# Patient Record
Sex: FEMALE | Race: WHITE | Employment: OTHER | URBAN - METROPOLITAN AREA
[De-identification: names, ages, dates, MRNs, and addresses within clinical notes are randomized per-mention and may not be internally consistent; named-entity substitution may affect disease eponyms.]

---

## 2017-01-25 ENCOUNTER — OFFICE VISIT (OUTPATIENT)
Dept: OBGYN | Facility: CLINIC | Age: 72
End: 2017-01-25
Payer: MEDICARE

## 2017-01-25 VITALS
DIASTOLIC BLOOD PRESSURE: 80 MMHG | HEART RATE: 69 BPM | SYSTOLIC BLOOD PRESSURE: 142 MMHG | HEIGHT: 64 IN | WEIGHT: 173 LBS | BODY MASS INDEX: 29.53 KG/M2

## 2017-01-25 DIAGNOSIS — Z46.89 PESSARY MAINTENANCE: Primary | ICD-10-CM

## 2017-01-25 PROCEDURE — 99212 OFFICE O/P EST SF 10 MIN: CPT | Performed by: OBSTETRICS & GYNECOLOGY

## 2017-01-25 NOTE — NURSING NOTE
"Initial /80 mmHg  Pulse 69  Ht 5' 3.5\" (1.613 m)  Wt 173 lb (78.472 kg)  BMI 30.16 kg/m2 Estimated body mass index is 30.16 kg/(m^2) as calculated from the following:    Height as of this encounter: 5' 3.5\" (1.613 m).    Weight as of this encounter: 173 lb (78.472 kg). .      "

## 2017-01-25 NOTE — PROGRESS NOTES
"Pessary Check    Ms. Mary Art 69 year old presents for pessary check. No issues or complaints at this time.     ROS: 10 point ROS neg other than the symptoms noted above in the HPI.    Exam:   /80 mmHg  Pulse 69  Ht 5' 3.5\" (1.613 m)  Wt 173 lb (78.472 kg)  BMI 30.16 kg/m2  General Appearance: NAD  Pelvic: Normal external female genitalia. No external lesions, normal hair distribution, no adenopathy. Speculum exam reveals vaginal epithelium mildly atrophic, no erosions or lesions noted. Bimanual exam revealed absent uterus and no pelvic masses. No pelvic tenderness.     Pessary cleaned and reinserted without incident    A/P: Pessary check  No complications     RTC in 3-4 months    Candi Masterson MD  [facility]  "

## 2017-01-25 NOTE — MR AVS SNAPSHOT
"              After Visit Summary   1/25/2017    Mary Art    MRN: [id number]           Patient Information     Date Of Birth          1945        Visit Information        Provider Department      1/25/2017 9:15 AM Candi Masterson MD [facility]        Today's Diagnoses     Pessary maintenance    -  1       Care Instructions    .        Follow-ups after your visit        Your next 10 appointments already scheduled     Jan 25, 2017  9:15 AM   SHORT with Candi Masterson MD   [facility] ([facility])    [address] [address]   [phone]              Who to contact     If you have questions or need follow up information about today's clinic visit or your schedule please contact [facility] directly at [phone].  Normal or non-critical lab and imaging results will be communicated to you by MyChart, letter or phone within 4 business days after the clinic has received the results. If you do not hear from us within 7 days, please contact the clinic through MyChart or phone. If you have a critical or abnormal lab result, we will notify you by phone as soon as possible.  Submit refill requests through [employer] or call your pharmacy and they will forward the refill request to us. Please allow 3 business days for your refill to be completed.          Additional Information About Your Visit        MyChart Information     [employer] lets you send messages to your doctor, view your test results, renew your prescriptions, schedule appointments and more. To sign up, go to [email or web address] . Click on \"Log in\" on the left side of the screen, which will take you to the Welcome page. Then click on \"Sign up Now\" on the right side of the page.     You will be asked to enter the access code listed below, as well as some personal information. Please follow the directions to create your username and password.     Your " "access code is: [email or web address]  Expires: 2017  9:00 AM     Your access code will  in 90 days. If you need help or a new code, please call your [facility] or [phone].        Care EveryWhere ID     This is your Care EveryWhere ID. This could be used by other organizations to access your [place] medical records  [id number]        Your Vitals Were     Height BMI (Body Mass Index)                5' 3.5\" (1.613 m) 30.16 kg/m2           Blood Pressure from Last 3 Encounters:   16 130/79   16 126/64   16 136/82    Weight from Last 3 Encounters:   17 173 lb (78.472 kg)   16 174 lb 3.2 oz (79.017 kg)   16 174 lb (78.926 kg)              We Performed the Following     FIT/INSERT INTRAVAG SUPPORT DEVICE/PESSARY        Primary Care Provider Office Phone # Fax #    Heather Waller [phone]       [facility] [address] [facility] [address]        Thank you!     Thank you for choosing [facility]  for your care. Our goal is always to provide you with excellent care. Hearing back from our patients is one way we can continue to improve our services. Please take a few minutes to complete the written survey that you may receive in the mail after your visit with us. Thank you!             Your Updated Medication List - Protect others around you: Learn how to safely use, store and throw away your medicines at www.disposemymeds.org.          This list is accurate as of: 17  9:00 AM.  Always use your most recent med list.                   Brand Name Dispense Instructions for use    ascorbic acid 500 MG tablet    VITAMIN C    3 MONTHS    ONE TABLET DAILY       aspirin 81 MG tablet      Take 1 tablet by mouth daily.       CENTRUM SILVER per tablet     90 Tab    ONE DAILY       CRANBERRY          estradiol 0.5 MG tablet    ESTRACE    36 tablet    Place 1 tablet in the vagina 3 times weekly.       lisinopril-hydrochlorothiazide " 10-12.5 MG per tablet    PRINZIDE/ZESTORETIC    90 tablet    Take 1 tablet by mouth daily       VITAMIN D PO      Take 1 tablet by mouth daily.

## 2017-03-14 ENCOUNTER — OFFICE VISIT (OUTPATIENT)
Dept: FAMILY MEDICINE | Facility: CLINIC | Age: 72
End: 2017-03-14
Payer: MEDICARE

## 2017-03-14 VITALS
HEART RATE: 85 BPM | TEMPERATURE: 97 F | SYSTOLIC BLOOD PRESSURE: 126 MMHG | WEIGHT: 168.8 LBS | OXYGEN SATURATION: 98 % | RESPIRATION RATE: 18 BRPM | BODY MASS INDEX: 29.43 KG/M2 | DIASTOLIC BLOOD PRESSURE: 62 MMHG

## 2017-03-14 DIAGNOSIS — R22.9 LOCALIZED SUPERFICIAL SWELLING, MASS, OR LUMP: ICD-10-CM

## 2017-03-14 DIAGNOSIS — R22.33 AXILLARY LUMP, BILATERAL: ICD-10-CM

## 2017-03-14 DIAGNOSIS — L30.9 DERMATITIS: Primary | ICD-10-CM

## 2017-03-14 PROCEDURE — 99214 OFFICE O/P EST MOD 30 MIN: CPT | Performed by: NURSE PRACTITIONER

## 2017-03-14 RX ORDER — TRIAMCINOLONE ACETONIDE 1 MG/G
CREAM TOPICAL
Qty: 80 G | Refills: 0 | Status: SHIPPED | OUTPATIENT
Start: 2017-03-14 | End: 2017-09-20 | Stop reason: ALTCHOICE

## 2017-03-14 NOTE — MR AVS SNAPSHOT
"              After Visit Summary   3/14/2017    Mary Art    MRN: [id number]           Patient Information     Date Of Birth          1945        Visit Information        Provider Department      3/14/2017 9:20 AM Pauly Hannah APRN CNP [facility]        Today's Diagnoses     Dermatitis    -  1    Localized superficial swelling, mass, or lump        Axillary lump, bilateral          Care Instructions    Apply a thin layer of Kenalog cream up to three times a day as needed.    Let us know if the rash does not resolve.    Schedule the ultrasound in [place] [phone]. We'll contact you with the results.        Follow-ups after your visit        Future tests that were ordered for you today     Open Future Orders        Priority Expected Expires Ordered    US Axillary Left Routine  3/14/2018 3/14/2017    US Axillary Right Routine  3/14/2018 3/14/2017            Who to contact     If you have questions or need follow up information about today's clinic visit or your schedule please contact [facility] directly at [phone].  Normal or non-critical lab and imaging results will be communicated to you by MyChart, letter or phone within 4 business days after the clinic has received the results. If you do not hear from us within 7 days, please contact the clinic through MyChart or phone. If you have a critical or abnormal lab result, we will notify you by phone as soon as possible.  Submit refill requests through [employer] or call your pharmacy and they will forward the refill request to us. Please allow 3 business days for your refill to be completed.          Additional Information About Your Visit        [employer] Information     [employer] lets you send messages to your doctor, view your test results, renew your prescriptions, schedule appointments and more. To sign up, go to [email or web address] . Click on \"Log in\" on the left side of the screen, which will take you to " "the Welcome page. Then click on \"Sign up Now\" on the right side of the page.     You will be asked to enter the access code listed below, as well as some personal information. Please follow the directions to create your username and password.     Your access code is: [email or web address]  Expires: 2017 10:00 AM     Your access code will  in 90 days. If you need help or a new code, please call your [place] clinic or [phone].        Care EveryWhere ID     This is your Care EveryWhere ID. This could be used by other organizations to access your [place] medical records  [id number]        Your Vitals Were     Pulse Temperature Respirations Pulse Oximetry BMI (Body Mass Index)       85 97  F (36.1  C) (Tympanic) 18 98% 29.43 kg/m2        Blood Pressure from Last 3 Encounters:   17 126/62   17 142/80   16 130/79    Weight from Last 3 Encounters:   17 168 lb 12.8 oz (76.6 kg)   17 173 lb (78.5 kg)   16 174 lb 3.2 oz (79 kg)                 Today's Medication Changes          These changes are accurate as of: 3/14/17  9:56 AM.  If you have any questions, ask your nurse or doctor.               Start taking these medicines.        Dose/Directions    triamcinolone 0.1 % cream   Commonly known as:  KENALOG   Used for:  Dermatitis   Started by:  Pauly Hannah APRN CNP        Apply sparingly to affected area three times daily as needed   Quantity:  80 g   Refills:  0            Where to get your medicines      These medications were sent to [facility] [place] - [facility] [address]  [address] [facility] [address]     Phone:  [phone]     triamcinolone 0.1 % cream                Primary Care Provider Office Phone # Fax #    Heather Waller [phone]       [facility] [address]  [facility] [address]        Thank you!     Thank you for choosing [facility]  for your care. Our goal is always to " provide you with excellent care. Hearing back from our patients is one way we can continue to improve our services. Please take a few minutes to complete the written survey that you may receive in the mail after your visit with us. Thank you!             Your Updated Medication List - Protect others around you: Learn how to safely use, store and throw away your medicines at www.disposemymeds.org.          This list is accurate as of: 3/14/17  9:56 AM.  Always use your most recent med list.                   Brand Name Dispense Instructions for use    ascorbic acid 500 MG tablet    VITAMIN C    3 MONTHS    ONE TABLET DAILY       aspirin 81 MG tablet      Take 1 tablet by mouth daily.       CENTRUM SILVER per tablet     90 Tab    ONE DAILY       CRANBERRY          estradiol 0.5 MG tablet    ESTRACE    36 tablet    Place 1 tablet in the vagina 3 times weekly.       lisinopril-hydrochlorothiazide 10-12.5 MG per tablet    PRINZIDE/ZESTORETIC    90 tablet    Take 1 tablet by mouth daily       triamcinolone 0.1 % cream    KENALOG    80 g    Apply sparingly to affected area three times daily as needed       VITAMIN D PO      Take 1 tablet by mouth daily.

## 2017-03-14 NOTE — PATIENT INSTRUCTIONS
Apply a thin layer of Kenalog cream up to three times a day as needed.    Let us know if the rash does not resolve.    Schedule the ultrasound in [place] [phone]. We'll contact you with the results.

## 2017-03-14 NOTE — NURSING NOTE
"Chief Complaint   Patient presents with     Mass       Initial There were no vitals taken for this visit. Estimated body mass index is 30.16 kg/(m^2) as calculated from the following:    Height as of 1/25/17: 5' 3.5\" (1.613 m).    Weight as of 1/25/17: 173 lb (78.5 kg).  Medication Reconciliation: complete  "

## 2017-03-14 NOTE — PROGRESS NOTES
SUBJECTIVE:                                                    Mary Art is a 71 year old female who presents to clinic today for the following health issues:      Bumps under right arm      Duration: 1.5 weeks    Description (location/character/radiation): bumps and redness under arm right > left    Intensity:  mild    Accompanying signs and symptoms: itching and redness    History (similar episodes/previous evaluation): had one other time years ago, shaved under arms    Precipitating or alleviating factors: shaved under arm    Therapies tried and outcome: None   Concerned that lump may be abnormal. Very Concerned.  Not painful.  Had in past, in 2013. Bilateral US at that time were negative.     Problem list and histories reviewed & adjusted, as indicated.  Additional history: as documented      Reviewed and updated as needed this visit by clinical staff  Tobacco  Allergies  Problems  Med Hx  Surg Hx  Fam Hx  Soc Hx      Reviewed and updated as needed this visit by Provider         ROS:  Constitutional, HEENT, cardiovascular, pulmonary, gi and gu systems are negative, except as otherwise noted.    OBJECTIVE:                                                    /62 (BP Location: Right arm, Patient Position: Chair, Cuff Size: Adult Large)  Pulse 85  Temp 97  F (36.1  C) (Tympanic)  Resp 18  Wt 168 lb 12.8 oz (76.6 kg)  SpO2 98%  BMI 29.43 kg/m2  Body mass index is 29.43 kg/(m^2).  GENERAL: healthy, alert and no distress  RESP: normal work of breathing   CV: regular rate and rhythm, normal S1 S2, no S3 or S4, no murmur, click or rub, no peripheral edema and peripheral pulses strong  SKIN: changes in bilateral axilla- multiple small lumps with red skin irritation  MS: no gross musculoskeletal defects noted, no edema  PSYCH: mentation appears normal, affect normal/bright    Diagnostic Test Results:       ASSESSMENT/PLAN:                                                        1. Dermatitis  -  triamcinolone (KENALOG) 0.1 % cream; Apply sparingly to affected area three times daily as needed  Dispense: 80 g; Refill: 0    2. Axillary lump, bilateral  Localized superficial swelling. Uncertain if this represents more than localized irritation  - US Axillary Left; Future  - US Axillary Right; Future    Patient Instructions   Apply a thin layer of Kenalog cream up to three times a day as needed.    Let us know if the rash does not resolve.    Schedule the ultrasound in [place] [phone]. We'll contact you with the results.      Pauly Hannah, BERNARDO, APRN [facility]

## 2017-03-20 ENCOUNTER — HOSPITAL ENCOUNTER (OUTPATIENT)
Dept: ULTRASOUND IMAGING | Facility: CLINIC | Age: 72
End: 2017-03-20
Attending: NURSE PRACTITIONER
Payer: MEDICARE

## 2017-03-20 ENCOUNTER — HOSPITAL ENCOUNTER (OUTPATIENT)
Dept: ULTRASOUND IMAGING | Facility: CLINIC | Age: 72
Discharge: HOME | End: 2017-03-20
Attending: NURSE PRACTITIONER | Admitting: NURSE PRACTITIONER
Payer: MEDICARE

## 2017-03-20 DIAGNOSIS — R22.33 AXILLARY LUMP, BILATERAL: ICD-10-CM

## 2017-03-20 PROCEDURE — 76882 US LMTD JT/FCL EVL NVASC XTR: CPT | Mod: 50

## 2017-05-25 ENCOUNTER — OFFICE VISIT (OUTPATIENT)
Dept: OBGYN | Facility: CLINIC | Age: 72
End: 2017-05-25
Payer: MEDICARE

## 2017-05-25 VITALS
SYSTOLIC BLOOD PRESSURE: 142 MMHG | DIASTOLIC BLOOD PRESSURE: 84 MMHG | BODY MASS INDEX: 28.34 KG/M2 | HEART RATE: 73 BPM | HEIGHT: 64 IN | WEIGHT: 166 LBS

## 2017-05-25 DIAGNOSIS — N81.10 VAGINAL PROLAPSE: Primary | ICD-10-CM

## 2017-05-25 DIAGNOSIS — Z46.89 PESSARY MAINTENANCE: ICD-10-CM

## 2017-05-25 PROCEDURE — 99212 OFFICE O/P EST SF 10 MIN: CPT | Performed by: OBSTETRICS & GYNECOLOGY

## 2017-05-25 NOTE — NURSING NOTE
"Chief Complaint   Patient presents with     RECHECK     pessary cleaning       Initial BP (!) 138/92 (BP Location: Right arm, Patient Position: Chair, Cuff Size: Adult Regular)  Pulse 73  Ht 5' 3.5\" (1.613 m)  Wt 166 lb (75.3 kg)  BMI 28.94 kg/m2 Estimated body mass index is 28.94 kg/(m^2) as calculated from the following:    Height as of this encounter: 5' 3.5\" (1.613 m).    Weight as of this encounter: 166 lb (75.3 kg).  Medication Reconciliation: complete     Darryn Gordon CMA      "

## 2017-05-25 NOTE — MR AVS SNAPSHOT
"              After Visit Summary   2017    Mary Art    MRN: [id number]           Patient Information     Date Of Birth          1945        Visit Information        Provider Department      2017 9:30 AM Edison Daily MD [facility]        Today's Diagnoses     Vaginal prolapse    -  1    Pessary maintenance           Follow-ups after your visit        Follow-up notes from your care team     Return in about 3 months (around 2017).      Who to contact     If you have questions or need follow up information about today's clinic visit or your schedule please contact [facility] directly at [phone].  Normal or non-critical lab and imaging results will be communicated to you by MyChart, letter or phone within 4 business days after the clinic has received the results. If you do not hear from us within 7 days, please contact the clinic through [employer] or phone. If you have a critical or abnormal lab result, we will notify you by phone as soon as possible.  Submit refill requests through [employer] or call your pharmacy and they will forward the refill request to us. Please allow 3 business days for your refill to be completed.          Additional Information About Your Visit        MyChart Information     [employer] lets you send messages to your doctor, view your test results, renew your prescriptions, schedule appointments and more. To sign up, go to [email or web address] . Click on \"Log in\" on the left side of the screen, which will take you to the Welcome page. Then click on \"Sign up Now\" on the right side of the page.     You will be asked to enter the access code listed below, as well as some personal information. Please follow the directions to create your username and password.     Your access code is: [email or web address]  Expires: 2017  9:48 AM     Your access code will  in 90 days. If you need help or a new code, please call your [facility] or " "[phone].        Care EveryWhere ID     This is your Care EveryWhere ID. This could be used by other organizations to access your [place] medical records  [id number]        Your Vitals Were     Pulse Height BMI (Body Mass Index)             73 5' 3.5\" (1.613 m) 28.94 kg/m2          Blood Pressure from Last 3 Encounters:   05/25/17 142/84   03/14/17 126/62   01/25/17 142/80    Weight from Last 3 Encounters:   05/25/17 166 lb (75.3 kg)   03/14/17 168 lb 12.8 oz (76.6 kg)   01/25/17 173 lb (78.5 kg)              Today, you had the following     No orders found for display       Primary Care Provider Office Phone # Fax #    Heather Waller [phone]       [facility] [address] [facility] [address]        Thank you!     Thank you for choosing [facility]  for your care. Our goal is always to provide you with excellent care. Hearing back from our patients is one way we can continue to improve our services. Please take a few minutes to complete the written survey that you may receive in the mail after your visit with us. Thank you!             Your Updated Medication List - Protect others around you: Learn how to safely use, store and throw away your medicines at www.disposemymeds.org.          This list is accurate as of: 5/25/17  9:48 AM.  Always use your most recent med list.                   Brand Name Dispense Instructions for use    ascorbic acid 500 MG tablet    VITAMIN C    3 MONTHS    ONE TABLET DAILY       aspirin 81 MG tablet      Take 1 tablet by mouth daily.       CENTRUM SILVER per tablet     90 Tab    ONE DAILY       CRANBERRY          estradiol 0.5 MG tablet    ESTRACE    36 tablet    Place 1 tablet in the vagina 3 times weekly.       lisinopril-hydrochlorothiazide 10-12.5 MG per tablet    PRINZIDE/ZESTORETIC    90 tablet    Take 1 tablet by mouth daily       triamcinolone 0.1 % cream    KENALOG    80 g    Apply sparingly to affected area three " times daily as needed       VITAMIN D PO      Take 1 tablet by mouth daily.

## 2017-05-25 NOTE — PROGRESS NOTES
"CC:  pessary check  HPI:  Mary Art is a 71 year old female     3 1/4 inch ring with support pessary  placed 1/22/2013 is in place.   No vaginal bleeding or pain noted.  Patient is  not removing the pessary   She is using estradiol tabs vaginally 3x weekly  Allergies: Keflex [cephalexin monohydrate] and Penicillins    ROS:  as per HPI      EXAM:  Blood pressure (!) 138/92, pulse 73, height 5' 3.5\" (1.613 m), weight 166 lb (75.3 kg), not currently breastfeeding.  General - pleasant female in no acute distress.  Pelvic - EG: normal adult female, BUS: within normal limits, Vagina: slightly atrophic, no discharge, vaginal walls all WNL, no lesions seen.   Rectovaginal - deferred.  Psychiactric - appropriate mood and affect  Neurological - alert and oriented X 3     pessary was removed, cleaned and reinserted.  A vaginal inspection was notdone.      ASSESSMENT/PLAN:  1. Vaginal prolapse    2. Pessary maintenance              Will have patient continue use of vaginal estrogen tabs 3 x/week and RTC in 3 months for a re-check of the vaginal walls. Will call clinic and come in sooner if problems developing.    Edison Daily MD    "

## 2017-06-06 ENCOUNTER — MEDICAL CORRESPONDENCE (OUTPATIENT)
Dept: HEALTH INFORMATION MANAGEMENT | Facility: CLINIC | Age: 72
End: 2017-06-06

## 2017-06-26 ENCOUNTER — TELEPHONE (OUTPATIENT)
Dept: FAMILY MEDICINE | Facility: CLINIC | Age: 72
End: 2017-06-26

## 2017-06-26 NOTE — TELEPHONE ENCOUNTER
[facility] is requesting a diagnostic hearing evaluation. Form on Dr. Michel's desk. Aileen Ross MA

## 2017-06-27 ENCOUNTER — TRANSFERRED RECORDS (OUTPATIENT)
Dept: HEALTH INFORMATION MANAGEMENT | Facility: CLINIC | Age: 72
End: 2017-06-27

## 2017-06-28 ENCOUNTER — TELEPHONE (OUTPATIENT)
Dept: FAMILY MEDICINE | Facility: CLINIC | Age: 72
End: 2017-06-28

## 2017-06-28 NOTE — TELEPHONE ENCOUNTER
Reason for Call:  Form, our goal is to have forms completed with 72 hours, however, some forms may require a visit or additional information.    Type of letter, form or note:  [facility] - Diagnostic Hearing Eval    Who is the form from?: [facility] - Diagnostic Hearing Eval (if other please explain)    Where did the form come from: form was faxed in    What clinic location was the form placed at?: [place]    Where the form was placed: 's Box    Form received back signed  6/28/17  Faxed Back & Sent to be scanned to this encounter  Jess Martin [facility]            Call taken on 6/28/2017 at 3:00 PM by Jess Martin

## 2017-09-13 ENCOUNTER — OFFICE VISIT (OUTPATIENT)
Dept: OBGYN | Facility: CLINIC | Age: 72
End: 2017-09-13
Payer: MEDICARE

## 2017-09-13 VITALS
HEART RATE: 87 BPM | BODY MASS INDEX: 28.92 KG/M2 | WEIGHT: 169.4 LBS | HEIGHT: 64 IN | DIASTOLIC BLOOD PRESSURE: 70 MMHG | SYSTOLIC BLOOD PRESSURE: 128 MMHG

## 2017-09-13 DIAGNOSIS — Z46.89 PESSARY MAINTENANCE: Primary | ICD-10-CM

## 2017-09-13 DIAGNOSIS — N81.10 VAGINAL PROLAPSE: ICD-10-CM

## 2017-09-13 PROCEDURE — 99213 OFFICE O/P EST LOW 20 MIN: CPT | Performed by: OBSTETRICS & GYNECOLOGY

## 2017-09-13 NOTE — PROGRESS NOTES
Mary is a 71 year old  female who presents for pessary recheck;  she currently has a ring pessary  3 1/2 inch size5 which she keeps in place continuously;  she reports no problems with her pessary, no discharge, no bleeding.. She is using vaginal estrogen three times a week.     ROS: 10 point ROS neg other than the symptoms noted above in the HPI.    Past Medical History:   Diagnosis Date     HTN        Past Surgical History:   Procedure Laterality Date     HYSTERECTOMY TOTAL ABDOMINAL      Total abdominal hysterectomy & bilateral salpingo-oophorectomy     KNEE SURGERY      Knee (L)       Current Outpatient Prescriptions   Medication Sig Dispense Refill     lisinopril-hydrochlorothiazide (PRINZIDE,ZESTORETIC) 10-12.5 MG per tablet Take 1 tablet by mouth daily 90 tablet 3     estradiol (ESTRACE) 0.5 MG tablet Place 1 tablet in the vagina 3 times weekly. 36 tablet 4     CRANBERRY        aspirin 81 MG tablet Take 1 tablet by mouth daily.        VITAMIN D PO Take 1 tablet by mouth daily.       CENTRUM SILVER OR TABS ONE DAILY 90 Tab 3     VITAMIN C 500 MG OR TABS ONE TABLET DAILY 3 MONTHS 3     triamcinolone (KENALOG) 0.1 % cream Apply sparingly to affected area three times daily as needed (Patient not taking: Reported on 2017) 80 g 0       Social History     Social History     Marital status:      Spouse name: N/A     Number of children: N/A     Years of education: N/A     Social History Main Topics     Smoking status: Never Smoker     Smokeless tobacco: Never Used     Alcohol use No     Drug use: No     Sexual activity: Yes     Partners: Male     Other Topics Concern      Service No     Blood Transfusions No     Caffeine Concern No     0--2 cups     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 No     Social History Narrative       Family History   Problem Relation Age of Onset     Hypertension Mother      Hypertension Father      DIABETES Father      Hypertension Sister      Hypertension Brother      C.A.D. No family hx of      meds reviewed    OBJECTIVE: /70 (BP Location: Right arm, Patient Position: Chair, Cuff Size: Adult Regular)  Pulse 87  Ht 5' 3.5\" (1.613 m)  Wt 169 lb 6.4 oz (76.8 kg)  BMI 29.54 kg/m2  No LMP recorded. Patient has had a hysterectomy.  The pessary was removed with no difficulty, cleaned in warm water and  replaced, after inspection of the vulva and vagina, which showed normal, no erosions.    ASSESSMENT / PLAN:  (Z46.89) Pessary maintenance  (primary encounter diagnosis)  Comment: good fit  Plan: change estradiol use to once weekly   F/u 4 months    (N81.10) Vaginal prolapse  Comment: pessary use  Plan: happy with it  Yue Reese MD        "

## 2017-09-13 NOTE — MR AVS SNAPSHOT
"              After Visit Summary   9/13/2017    Mary Art    MRN: [id number]           Patient Information     Date Of Birth          1945        Visit Information        Provider Department      9/13/2017 9:00 AM Yue Reese MD [facility]        Today's Diagnoses     Pessary maintenance    -  1    Vaginal prolapse           Follow-ups after your visit        Your next 10 appointments already scheduled     Sep 20, 2017  8:20 AM CDT   [facility] REYES Edward C[facility]   [facility]    [address] [facility] [id number] [phone]              Who to contact     If you have questions or need follow up information about today's clinic visit or your schedule please contact [facility] directly at [phone].  Normal or non-critical lab and imaging results will be communicated to you by MyChart, letter or phone within 4 business days after the clinic has received the results. If you do not hear from us within 7 days, please contact the clinic through MyChart or phone. If you have a critical or abnormal lab result, we will notify you by phone as soon as possible.  Submit refill requests through [employer] or call your pharmacy and they will forward the refill request to us. Please allow 3 business days for your refill to be completed.          Additional Information About Your Visit        MyChart Information     [employer] lets you send messages to your doctor, view your test results, renew your prescriptions, schedule appointments and more. To sign up, go to [email or web address] . Click on \"Log in\" on the left side of the screen, which will take you to the Welcome page. Then click on \"Sign up Now\" on the right side of the page.     You will be asked to enter the access code listed below, as well as some personal information. Please follow the directions to create your username and password.     Your access " "code is: [email or web address]  Expires: 2017  9:50 AM     Your access code will  in 90 days. If you need help or a new code, please call your [place] clinic or [phone].        Care EveryWhere ID     This is your Care EveryWhere ID. This could be used by other organizations to access your [place] medical records  [id number]        Your Vitals Were     Pulse Height BMI (Body Mass Index)             87 5' 3.5\" (1.613 m) 29.54 kg/m2          Blood Pressure from Last 3 Encounters:   17 128/70   17 142/84   17 126/62    Weight from Last 3 Encounters:   17 169 lb 6.4 oz (76.8 kg)   17 166 lb (75.3 kg)   17 168 lb 12.8 oz (76.6 kg)              Today, you had the following     No orders found for display       Primary Care Provider Office Phone # Fax #    Heather Waller [phone]       [address] [place] [address]        Equal Access to Services     [facility] AH: Hadii benitez ku hadasho Sochaimali, waaxda luqadaha, qaybta kaalmada adecamilayada, josee jones . So [facility] [phone].    ATENCIÓN: Si habla español, tiene a umana disposición servicios gratuitos de asistencia lingüística. Lexame al [phone].    We comply with applicable federal civil rights laws and [place] laws. We do not discriminate on the basis of race, color, national origin, age, disability sex, sexual orientation or gender identity.            Thank you!     Thank you for choosing [facility]  for your care. Our goal is always to provide you with excellent care. Hearing back from our patients is one way we can continue to improve our services. Please take a few minutes to complete the written survey that you may receive in the mail after your visit with us. Thank you!             Your Updated Medication List - Protect others around you: Learn how to safely use, store and throw away your medicines at www.disposemymeds.org.          This list is " accurate as of: 9/13/17  9:50 AM.  Always use your most recent med list.                   Brand Name Dispense Instructions for use Diagnosis    ascorbic acid 500 MG tablet    VITAMIN C    3 MONTHS    ONE TABLET DAILY        aspirin 81 MG tablet      Take 1 tablet by mouth daily.        CENTRUM SILVER per tablet     90 Tab    ONE DAILY        CRANBERRY           estradiol 0.5 MG tablet    ESTRACE    36 tablet    Place 1 tablet in the vagina 3 times weekly.    Vaginal prolapse, Pessary maintenance       lisinopril-hydrochlorothiazide 10-12.5 MG per tablet    PRINZIDE/ZESTORETIC    90 tablet    Take 1 tablet by mouth daily    Essential hypertension, benign       triamcinolone 0.1 % cream    KENALOG    80 g    Apply sparingly to affected area three times daily as needed    Dermatitis       VITAMIN D PO      Take 1 tablet by mouth daily.

## 2017-09-13 NOTE — NURSING NOTE
"Chief Complaint   Patient presents with     Pessary Check/Fit/Insert       Initial /70 (BP Location: Right arm, Patient Position: Chair, Cuff Size: Adult Regular)  Pulse 87  Ht 5' 3.5\" (1.613 m)  Wt 169 lb 6.4 oz (76.8 kg)  BMI 29.54 kg/m2 Estimated body mass index is 29.54 kg/(m^2) as calculated from the following:    Height as of this encounter: 5' 3.5\" (1.613 m).    Weight as of this encounter: 169 lb 6.4 oz (76.8 kg).  Medication Reconciliation: complete     Magalie Garcia LPN      "

## 2017-09-20 ENCOUNTER — OFFICE VISIT (OUTPATIENT)
Dept: FAMILY MEDICINE | Facility: CLINIC | Age: 72
End: 2017-09-20
Payer: MEDICARE

## 2017-09-20 VITALS
OXYGEN SATURATION: 98 % | TEMPERATURE: 96.9 F | HEART RATE: 78 BPM | DIASTOLIC BLOOD PRESSURE: 72 MMHG | SYSTOLIC BLOOD PRESSURE: 126 MMHG | RESPIRATION RATE: 18 BRPM | WEIGHT: 170 LBS | BODY MASS INDEX: 29.64 KG/M2

## 2017-09-20 DIAGNOSIS — N81.10 VAGINAL PROLAPSE: ICD-10-CM

## 2017-09-20 DIAGNOSIS — I10 ESSENTIAL HYPERTENSION, BENIGN: ICD-10-CM

## 2017-09-20 DIAGNOSIS — Z13.220 SCREENING FOR HYPERLIPIDEMIA: ICD-10-CM

## 2017-09-20 DIAGNOSIS — B37.2 CANDIDIASIS OF SKIN: Primary | ICD-10-CM

## 2017-09-20 LAB
ALBUMIN SERPL-MCNC: 3.6 G/DL (ref 3.4–5)
ALP SERPL-CCNC: 100 U/L (ref 40–150)
ALT SERPL W P-5'-P-CCNC: 22 U/L (ref 0–50)
ANION GAP SERPL CALCULATED.3IONS-SCNC: 7 MMOL/L (ref 3–14)
AST SERPL W P-5'-P-CCNC: 16 U/L (ref 0–45)
BILIRUB SERPL-MCNC: 0.4 MG/DL (ref 0.2–1.3)
BUN SERPL-MCNC: 21 MG/DL (ref 7–30)
CALCIUM SERPL-MCNC: 9.5 MG/DL (ref 8.5–10.1)
CHLORIDE SERPL-SCNC: 102 MMOL/L (ref 94–109)
CHOLEST SERPL-MCNC: 185 MG/DL
CO2 SERPL-SCNC: 27 MMOL/L (ref 20–32)
CREAT SERPL-MCNC: 0.68 MG/DL (ref 0.52–1.04)
GFR SERPL CREATININE-BSD FRML MDRD: 85 ML/MIN/1.7M2
GLUCOSE SERPL-MCNC: 121 MG/DL (ref 70–99)
HDLC SERPL-MCNC: 70 MG/DL
LDLC SERPL CALC-MCNC: 98 MG/DL
NONHDLC SERPL-MCNC: 115 MG/DL
POTASSIUM SERPL-SCNC: 4 MMOL/L (ref 3.4–5.3)
PROT SERPL-MCNC: 7.5 G/DL (ref 6.8–8.8)
SODIUM SERPL-SCNC: 136 MMOL/L (ref 133–144)
TRIGL SERPL-MCNC: 85 MG/DL

## 2017-09-20 PROCEDURE — 99214 OFFICE O/P EST MOD 30 MIN: CPT | Performed by: NURSE PRACTITIONER

## 2017-09-20 PROCEDURE — 36415 COLL VENOUS BLD VENIPUNCTURE: CPT | Performed by: NURSE PRACTITIONER

## 2017-09-20 PROCEDURE — 80061 LIPID PANEL: CPT | Performed by: NURSE PRACTITIONER

## 2017-09-20 PROCEDURE — 80053 COMPREHEN METABOLIC PANEL: CPT | Performed by: NURSE PRACTITIONER

## 2017-09-20 RX ORDER — ESTRADIOL 0.5 MG/1
TABLET ORAL
Qty: 12 TABLET | Refills: 3 | Status: SHIPPED | OUTPATIENT
Start: 2017-09-20 | End: 2018-09-13

## 2017-09-20 RX ORDER — LISINOPRIL/HYDROCHLOROTHIAZIDE 10-12.5 MG
1 TABLET ORAL DAILY
Qty: 90 TABLET | Refills: 3 | Status: SHIPPED | OUTPATIENT
Start: 2017-09-20 | End: 2018-09-13

## 2017-09-20 RX ORDER — NYSTATIN 100000 U/G
CREAM TOPICAL
Qty: 30 G | Refills: 1 | Status: SHIPPED | OUTPATIENT
Start: 2017-09-20 | End: 2019-09-04

## 2017-09-20 NOTE — MR AVS SNAPSHOT
After Visit Summary   9/20/2017    Mary Art    MRN: [id number]           Patient Information     Date Of Birth          1945        Visit Information        Provider Department      9/20/2017 8:20 AM Pauly Hannah APRN CNP [facility]        Today's Diagnoses     CARDIOVASCULAR SCREENING; LDL GOAL LESS THAN 130    -  1    BENIGN HYPERTENSION        Vaginal prolapse        Pessary maintenance        Candidiasis of skin          Care Instructions    Stop the Triamcinolone cream    Start the Nystatin cream. Apply a thin layer to rash 3 times a day for 14 days, then as needed    We will call you with the results of your labs     Your clinic record indicates that you are due for:  complete physical exam                 Follow-ups after your visit        Who to contact     If you have questions or need follow up information about today's clinic visit or your schedule please contact [facility] directly at [phone].  Normal or non-critical lab and imaging results will be communicated to you by MyChart, letter or phone within 4 business days after the clinic has received the results. If you do not hear from us within 7 days, please contact the clinic through MyChart or phone. If you have a critical or abnormal lab result, we will notify you by phone as soon as possible.  Submit refill requests through [employer] or call your pharmacy and they will forward the refill request to us. Please allow 3 business days for your refill to be completed.          Additional Information About Your Visit        Care EveryWhere ID     This is your Care EveryWhere ID. This could be used by other organizations to access your [place] medical records  [id number]        Your Vitals Were     Pulse Temperature Respirations Pulse Oximetry Breastfeeding? BMI (Body Mass Index)    78 96.9  F (36.1  C) (Tympanic) 18 98% No 29.64 kg/m2       Blood Pressure from Last 3 Encounters:    09/20/17 126/72   09/13/17 128/70   05/25/17 142/84    Weight from Last 3 Encounters:   09/20/17 170 lb (77.1 kg)   09/13/17 169 lb 6.4 oz (76.8 kg)   05/25/17 166 lb (75.3 kg)              We Performed the Following     Comprehensive metabolic panel (BMP + Alb, Alk Phos, ALT, AST, Total. Bili, TP)     Lipid Profile          Today's Medication Changes          These changes are accurate as of: 9/20/17  8:59 AM.  If you have any questions, ask your nurse or doctor.               Start taking these medicines.        Dose/Directions    nystatin cream   Commonly known as:  MYCOSTATIN   Used for:  Candidiasis of skin   Started by:  Pauly Hannah APRN CNP        Apply thin layer to rash 3 times a day for 14 days, then as needed   Quantity:  30 g   Refills:  1         These medicines have changed or have updated prescriptions.        Dose/Directions    estradiol 0.5 MG tablet   Commonly known as:  ESTRACE   This may have changed:  additional instructions   Used for:  Vaginal prolapse, Pessary maintenance   Changed by:  Pauly Hannah APRN CNP        Place 1 tablet in the vagina once weekly.   Quantity:  12 tablet   Refills:  3         Stop taking these medicines if you haven't already. Please contact your care team if you have questions.     triamcinolone 0.1 % cream   Commonly known as:  KENALOG   Stopped by:  Pauly Hannah APRN CNP                Where to get your medicines      These medications were sent to [facility] [address] [address]     Phone:  [phone]     estradiol 0.5 MG tablet    lisinopril-hydrochlorothiazide 10-12.5 MG per tablet    nystatin cream                Primary Care Provider Office Phone # Fax #    Heather Waller [phone]       [address] [place] [address]        Equal Access to Services     [facility] AH: Rommel Nicole, dominguez rawls, qajad beck,  josee quickcamila montero'aan ah. So [facility] [phone].    ATENCIÓN: Si utela jeremy, tiene a umana disposición servicios gratuitos de asistencia lingüística. Marlene pulido [phone].    We comply with applicable federal civil rights laws and [place] laws. We do not discriminate on the basis of race, color, national origin, age, disability sex, sexual orientation or gender identity.            Thank you!     Thank you for choosing [facility]  for your care. Our goal is always to provide you with excellent care. Hearing back from our patients is one way we can continue to improve our services. Please take a few minutes to complete the written survey that you may receive in the mail after your visit with us. Thank you!             Your Updated Medication List - Protect others around you: Learn how to safely use, store and throw away your medicines at www.disposemymeds.org.          This list is accurate as of: 9/20/17  8:59 AM.  Always use your most recent med list.                   Brand Name Dispense Instructions for use Diagnosis    ascorbic acid 500 MG tablet    VITAMIN C    3 MONTHS    ONE TABLET DAILY        aspirin 81 MG tablet      Take 1 tablet by mouth daily.        CENTRUM SILVER per tablet     90 Tab    ONE DAILY        CRANBERRY           estradiol 0.5 MG tablet    ESTRACE    12 tablet    Place 1 tablet in the vagina once weekly.    Vaginal prolapse, Pessary maintenance       lisinopril-hydrochlorothiazide 10-12.5 MG per tablet    PRINZIDE/ZESTORETIC    90 tablet    Take 1 tablet by mouth daily    Essential hypertension, benign       nystatin cream    MYCOSTATIN    30 g    Apply thin layer to rash 3 times a day for 14 days, then as needed    Candidiasis of skin       VITAMIN D PO      Take 1 tablet by mouth daily.

## 2017-09-20 NOTE — NURSING NOTE
"Chief Complaint   Patient presents with     Hypertension     refill     Refill Request     Estrace     Derm Problem       Initial /72 (BP Location: Right arm, Patient Position: Chair, Cuff Size: Adult Large)  Pulse 78  Temp 96.9  F (36.1  C) (Tympanic)  Resp 18  Wt 170 lb (77.1 kg)  SpO2 98%  Breastfeeding? No  BMI 29.64 kg/m2 Estimated body mass index is 29.64 kg/(m^2) as calculated from the following:    Height as of 9/13/17: 5' 3.5\" (1.613 m).    Weight as of this encounter: 170 lb (77.1 kg).  Medication Reconciliation: complete      Health Maintenance that is potentially due pending provider review:  NONE  Patient declined immunizations    n/a  "

## 2017-09-20 NOTE — PROGRESS NOTES
SUBJECTIVE:   Mary Art is a 71 year old female who presents to clinic today for the following health issues:      Hypertension Follow-up      Outpatient blood pressures are being checked at home.  Results are 120s/70s.    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    Rash    Duration: off and on for awhile    Description  Location: arm  Itching: no    Intensity:  mild    Accompanying signs and symptoms: redness of skin    History (similar episodes/previous evaluation): None    Precipitating or alleviating factors:  New exposures:  None  Recent travel: no      Therapies tried and outcome: topical steroid - kenalog cream has not helped  Has been seen in the past for this.  Kenalog cream helped a bit but rash is not cleared.  Rash comes and goes. Using the cream twice daily, not 3 times. Intermittent. Worse with warm weather.      Problem list and histories reviewed & adjusted, as indicated.  Additional history: as documented      Reviewed and updated as needed this visit by clinical staffTobacco  Allergies  Meds  Problems       Reviewed and updated as needed this visit by Provider  Allergies  Meds  Problems         ROS:  Constitutional, HEENT, cardiovascular, pulmonary, GI, , musculoskeletal, neuro, skin, endocrine and psych systems are negative, except as otherwise noted.      OBJECTIVE:   /72 (BP Location:  Right arm, Patient Position: Chair, Cuff Size: Adult Large)  Pulse 78  Temp 96.9  F (36.1  C) (Tympanic)  Resp 18  Wt 170 lb (77.1 kg)  SpO2 98%  Breastfeeding? No  BMI 29.64 kg/m2  Body mass index is 29.64 kg/(m^2).  GENERAL: healthy, alert, no distress and poor dentition  RESP: lungs clear to auscultation - no rales, rhonchi or wheezes  CV: regular rate and rhythm, normal S1 S2, no S3 or S4, no murmur, click or rub, no peripheral edema and peripheral pulses strong  SKIN: Scattered, pink rash with irregular borders noted in both axilla.  Area is moist.. Consistent with candidiasis  PSYCH: mentation appears normal, affect normal/bright    Diagnostic Test Results:  none     ASSESSMENT/PLAN:     ASSESSMENT:  1. Candidiasis of skin .    2. BENIGN HYPERTENSION . Stable.  No change in plan of care.   3. Vaginal prolapse .  Decreased Estrace to once weekly as noted in GYN visit note    4. Screening for hyperlipidemia        PLAN:  Orders Placed This Encounter     Lipid Profile     Comprehensive metabolic panel (BMP + Alb, Alk Phos, ALT, AST, Total. Bili, TP)     lisinopril-hydrochlorothiazide (PRINZIDE/ZESTORETIC) 10-12.5 MG per tablet     estradiol (ESTRACE) 0.5 MG tablet     nystatin (MYCOSTATIN) cream       Patient Instructions   Stop the Triamcinolone cream    Start the Nystatin cream. Apply a thin layer to rash 3 times a day for 14 days, then as needed    We will call you with the results of your labs     Your clinic record indicates that you are due for:  complete physical exam           Pauly Hannah, BERNARDO, APRN CNP  [facility]

## 2017-09-20 NOTE — PATIENT INSTRUCTIONS
Stop the Triamcinolone cream    Start the Nystatin cream. Apply a thin layer to rash 3 times a day for 14 days, then as needed    We will call you with the results of your labs     Your clinic record indicates that you are due for:  complete physical exam

## 2017-09-21 DIAGNOSIS — R73.09 ELEVATED GLUCOSE: Primary | ICD-10-CM

## 2017-09-26 ENCOUNTER — OFFICE VISIT (OUTPATIENT)
Dept: FAMILY MEDICINE | Facility: CLINIC | Age: 72
End: 2017-09-26
Payer: MEDICARE

## 2017-09-26 VITALS
HEIGHT: 63 IN | OXYGEN SATURATION: 99 % | SYSTOLIC BLOOD PRESSURE: 138 MMHG | DIASTOLIC BLOOD PRESSURE: 66 MMHG | WEIGHT: 170.2 LBS | HEART RATE: 71 BPM | BODY MASS INDEX: 30.16 KG/M2 | TEMPERATURE: 96.4 F | RESPIRATION RATE: 18 BRPM

## 2017-09-26 DIAGNOSIS — R73.09 ELEVATED GLUCOSE: ICD-10-CM

## 2017-09-26 DIAGNOSIS — Z00.00 MEDICARE ANNUAL WELLNESS VISIT, SUBSEQUENT: Primary | ICD-10-CM

## 2017-09-26 DIAGNOSIS — Z13.29 SCREENING FOR HYPOTHYROIDISM: ICD-10-CM

## 2017-09-26 LAB
HBA1C MFR BLD: 6.2 % (ref 4.3–6)
TSH SERPL DL<=0.005 MIU/L-ACNC: 1.18 MU/L (ref 0.4–4)

## 2017-09-26 PROCEDURE — 83036 HEMOGLOBIN GLYCOSYLATED A1C: CPT | Performed by: NURSE PRACTITIONER

## 2017-09-26 PROCEDURE — 84443 ASSAY THYROID STIM HORMONE: CPT | Performed by: NURSE PRACTITIONER

## 2017-09-26 PROCEDURE — G0439 PPPS, SUBSEQ VISIT: HCPCS | Performed by: NURSE PRACTITIONER

## 2017-09-26 PROCEDURE — 36415 COLL VENOUS BLD VENIPUNCTURE: CPT | Performed by: NURSE PRACTITIONER

## 2017-09-26 NOTE — PROGRESS NOTES
SUBJECTIVE:   Mary Art is a 71 year old female who presents for Preventive Visit.      Are you in the first 12 months of your Medicare Part B coverage?  No    Healthy Habits:    Do you get at least three servings of calcium containing foods daily (dairy, green leafy vegetables, etc.)? 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 yes    Do you see a dentist twice per year? yes    Do you have sleep apnea, excessive snoring or daytime drowsiness?no    COGNITIVE SCREEN  1) Repeat 3 items (Banana, Sunrise, Chair)    2) Clock draw: ABNORMAL hands were incorrect  3) 3 item recall: Recalls 3 objects  Results: 3 items recalled: COGNITIVE IMPAIRMENT LESS LIKELY    Mini-CogTM Copyright S Humza. Licensed by the author for use in [facility]; reprinted with permission ([email or web address]@[email or web address]). All rights reserved.      Hypertension  BP Readings from Last 6 Encounters:   09/26/17 138/66   09/20/17 126/72   09/13/17 128/70   05/25/17 142/84   03/14/17 126/62   01/25/17 142/80       Family of elevated glucose  Lab Results   Component Value Date    A1C 6.2 09/26/2017    A1C 6.2 11/11/2013    A1C 6.3 12/17/2012    A1C 6.5 05/31/2012    A1C 6.6 04/18/2011         Reviewed and updated as needed this visit by clinical staffTobacco  Allergies  Meds  Problems         Reviewed and updated as needed this visit by Provider  Allergies  Meds  Problems        Social History   Substance Use Topics     Smoking status: Never Smoker     Smokeless tobacco: Never Used     Alcohol use No       The patient does not drink >3 drinks per day nor >7 drinks per week.    Today's PHQ-2 Score:   PHQ-2 ( 1999 Pf[employer]) 9/20/2017 9/13/2016   Q1: Little interest or pleasure in doing things 0 0   Q2: Feeling down, depressed or hopeless 0 0   PHQ-2  Score 0 0         Do you feel safe in your environment - Yes    Do you have a Health Care Directive?: No: Advance care planning reviewed with patient; information given to patient to review.      Current providers sharing in care for this patient include: Patient Care Team:  Heather Waller MD as PCP - General      Hearing impairment: Yes, wears bilateral hearing aides    Ability to successfully perform activities of daily living: Yes, no assistance needed     Fall risk:         Home safety:  none identified    The following health maintenance items are reviewed in Epic and correct as of today:  Health Maintenance   Topic Date Due     MEDICARE ANNUAL WELLNESS VISIT  11/30/1963     FALL RISK ASSESSMENT  09/20/2018     PNEUMOCOCCAL (2 of 2 - PPSV23) 09/20/2018     ADVANCE DIRECTIVE PLANNING Q5 YRS  11/04/2018     MAMMO SCREEN Q2 YR (SYSTEM ASSIGNED)  12/12/2018     TETANUS IMMUNIZATION (SYSTEM ASSIGNED)  04/18/2021     COLON CANCER SCREEN (SYSTEM ASSIGNED)  04/18/2021     LIPID SCREEN Q5 YR FEMALE (SYSTEM ASSIGNED)  09/20/2022     INFLUENZA VACCINE (SYSTEM ASSIGNED)  Addressed     DEXA SCAN SCREENING (SYSTEM ASSIGNED)  Completed     BP Readings from Last 3 Encounters:   09/26/17 138/66   09/20/17 126/72   09/13/17 128/70    Wt Readings from Last 3 Encounters:   09/26/17 170 lb 3.2 oz (77.2 kg)   09/20/17 170 lb (77.1 kg)   09/13/17 169 lb 6.4 oz (76.8 kg)                  Patient Active Problem List   Diagnosis     Essential hypertension, benign     CARDIOVASCULAR SCREENING; LDL GOAL LESS THAN 130     History of elevated glucose     UTI (urinary tract infection)     HL (hearing loss)     Vaginal prolapse     Pessary maintenance     Advanced directives, counseling/discussion     Screening for osteoporosis     Axillary adenopathy     Family history of colon cancer; sister, dx 2014, pt with no screening yet     Parent refuses immunizations     Past Surgical History:   Procedure Laterality Date     HYSTERECTOMY TOTAL  "ABDOMINAL  1991    Total abdominal hysterectomy & bilateral salpingo-oophorectomy     KNEE SURGERY  2003    Knee (L)       Social History   Substance Use Topics     Smoking status: Never Smoker     Smokeless tobacco: Never Used     Alcohol use No     Family History   Problem Relation Age of Onset     Hypertension Mother      Hypertension Father      DIABETES Father      Hypertension Sister      Hypertension Brother      C.A.D. No family hx of          Current Outpatient Prescriptions   Medication Sig Dispense Refill     lisinopril-hydrochlorothiazide (PRINZIDE/ZESTORETIC) 10-12.5 MG per tablet Take 1 tablet by mouth daily 90 tablet 3     estradiol (ESTRACE) 0.5 MG tablet Place 1 tablet in the vagina once weekly. 12 tablet 3     nystatin (MYCOSTATIN) cream Apply thin layer to rash 3 times a day for 14 days, then as needed 30 g 1     CRANBERRY        aspirin 81 MG tablet Take 1 tablet by mouth daily.        VITAMIN D PO Take 1 tablet by mouth daily.       CENTRUM SILVER OR TABS ONE DAILY 90 Tab 3     VITAMIN C 500 MG OR TABS ONE TABLET DAILY 3 MONTHS 3     Allergies   Allergen Reactions     Keflex [Cephalexin Monohydrate] Rash     Penicillins Hives           Health maintenance: patient refuses tetanus, pneumonia immunizations, flu shot and colonoscopy. Benefits reviewed and voices understanding     ROS:  Constitutional, HEENT, cardiovascular, pulmonary, GI, , musculoskeletal, neuro, skin, endocrine and psych systems are negative, except as otherwise noted.      OBJECTIVE:   /66 (BP Location: Right arm, Patient Position: Chair, Cuff Size: Adult Large)  Pulse 71  Temp 96.4  F (35.8  C) (Tympanic)  Resp 18  Ht 5' 3.25\" (1.607 m)  Wt 170 lb 3.2 oz (77.2 kg)  SpO2 99%  Breastfeeding? No  BMI 29.91 kg/m2 Estimated body mass index is 29.91 kg/(m^2) as calculated from the following:    Height as of this encounter: 5' 3.25\" (1.607 m).    Weight as of this encounter: 170 lb 3.2 oz (77.2 kg).  EXAM:   GENERAL " APPEARANCE: healthy, alert and no distress  EYES: Eyes grossly normal to inspection, PERRL and conjunctivae and sclerae normal  HENT: ear canals and TM's normal, nose and mouth without ulcers or lesions, oropharynx clear and oral mucous membranes moist  NECK: no adenopathy, no asymmetry, masses, or scars and thyroid normal to palpation  RESP: lungs clear to auscultation - no rales, rhonchi or wheezes  BREAST: normal without masses, tenderness or nipple discharge and no palpable axillary masses or adenopathy  CV: regular rate and rhythm, normal S1 S2, no S3 or S4, no murmur, click or rub, no peripheral edema and peripheral pulses strong  ABDOMEN: soft, nontender, no hepatosplenomegaly, no masses and bowel sounds normal  MS: no musculoskeletal defects are noted and gait is age appropriate without ataxia  SKIN: no suspicious lesions or rashes  NEURO: Normal strength and tone, sensory exam grossly normal, mentation intact and speech normal  PSYCH: mentation appears normal and affect normal/bright    ASSESSMENT / PLAN:   Mary was seen today for medicare visit.    Diagnoses and all orders for this visit:    Medicare annual wellness visit, subsequent    Elevated glucose  -     **A1C FUTURE 3mo    Screening for hypothyroidism  -     TSH with free T4 reflex        End of Life Planning:  Patient currently has an advanced directive: No.  I have verified the patient's ablity to prepare an advanced directive/make health care decisions.  Literature was provided to assist patient in preparing an advanced directive.    COUNSELING:  Reviewed preventive health counseling, as reflected in patient instructions  Special attention given to:       Regular exercise       Healthy diet/nutrition       Immunizations    Declined: Influenza, Pneumococcal and TDAP due to Concerns about side effects/safety             Colon cancer screening          Estimated body mass index is 29.91 kg/(m^2) as calculated from the following:    Height as of  "this encounter: 5' 3.25\" (1.607 m).    Weight as of this encounter: 170 lb 3.2 oz (77.2 kg).     reports that she has never smoked. She has never used smokeless tobacco.        Appropriate preventive services were discussed with this patient, including applicable screening as appropriate for cardiovascular disease, diabetes, osteopenia/osteoporosis, and glaucoma.  As appropriate for age/gender, discussed screening for colorectal cancer, prostate cancer, breast cancer, and cervical cancer. Checklist reviewing preventive services available has been given to the patient.    Reviewed patients plan of care and provided an AVS. The Basic Care Plan (routine screening as documented in Health Maintenance) for Mary meets the Care Plan requirement. This Care Plan has been established and reviewed with the Patient.    Counseling Resources:  ATP IV Guidelines  Pooled Cohorts Equation Calculator  Breast Cancer Risk Calculator  FRAX Risk Assessment  ICSI Preventive Guidelines  Dietary Guidelines for Americans, 2010  USDA's MyPlate  ASA Prophylaxis  Lung CA Screening    Pauly Hannah, BERNARDO, APRN CNP  [facility]  "

## 2017-09-26 NOTE — NURSING NOTE
"Chief Complaint   Patient presents with     Medicare Visit       Initial /66 (BP Location: Right arm, Patient Position: Chair, Cuff Size: Adult Large)  Pulse 71  Temp 96.4  F (35.8  C) (Tympanic)  Resp 18  Ht 5' 3.25\" (1.607 m)  Wt 170 lb 3.2 oz (77.2 kg)  SpO2 99%  Breastfeeding? No  BMI 29.91 kg/m2 Estimated body mass index is 29.91 kg/(m^2) as calculated from the following:    Height as of this encounter: 5' 3.25\" (1.607 m).    Weight as of this encounter: 170 lb 3.2 oz (77.2 kg).  Medication Reconciliation: complete      Health Maintenance that is potentially due pending provider review:  NONE    n/a  "

## 2017-09-26 NOTE — MR AVS SNAPSHOT
After Visit Summary   9/26/2017    Mary Art    MRN: [id number]           Patient Information     Date Of Birth          1945        Visit Information        Provider Department      9/26/2017 7:40 AM Pauly Hannah APRN [facility]        Today's Diagnoses     Medicare annual wellness visit, subsequent    -  1    Elevated glucose        Screening for hypothyroidism          Care Instructions    We will call you with the results of your labs       Preventive Health Recommendations    Female Ages 65 +    Yearly exam:     See your health care provider every year in order to  o Review health changes.   o Discuss preventive care.    o Review your medicines if your doctor has prescribed any.      You no longer need a yearly Pap test unless you've had an abnormal Pap test in the past 10 years. If you have vaginal symptoms, such as bleeding or discharge, be sure to talk with your provider about a Pap test.      Every 1 to 2 years, have a mammogram.  If you are over 69, talk with your health care provider about whether or not you want to continue having screening mammograms.      Every 10 years, have a colonoscopy. Or, have a yearly FIT test (stool test). These exams will check for colon cancer.       Have a cholesterol test every 5 years, or more often if your doctor advises it.       Have a diabetes test (fasting glucose) every three years. If you are at risk for diabetes, you should have this test more often.       At age 65, have a bone density scan (DEXA) to check for osteoporosis (brittle bone disease).    Shots:    Get a flu shot each year.    Get a tetanus shot every 10 years.    Talk to your doctor about your pneumonia vaccines. There are now two you should receive - Pneumovax (PPSV 23) and Prevnar (PCV 13).    Talk to your doctor about the shingles vaccine.    Talk to your doctor about the hepatitis B vaccine.    Nutrition:     Eat at least 5 servings of fruits and  "vegetables each day.      Eat whole-grain bread, whole-wheat pasta and brown rice instead of white grains and rice.      Talk to your provider about Calcium and Vitamin D.     Lifestyle    Exercise at least 150 minutes a week (30 minutes a day, 5 days a week). This will help you control your weight and prevent disease.      Limit alcohol to one drink per day.      No smoking.       Wear sunscreen to prevent skin cancer.       See your dentist twice a year for an exam and cleaning.      See your eye doctor every 1 to 2 years to screen for conditions such as glaucoma, macular degeneration and cataracts.          Follow-ups after your visit        Who to contact     If you have questions or need follow up information about today's clinic visit or your schedule please contact [facility] directly at [phone].  Normal or non-critical lab and imaging results will be communicated to you by MyChart, letter or phone within 4 business days after the clinic has received the results. If you do not hear from us within 7 days, please contact the clinic through MyChart or phone. If you have a critical or abnormal lab result, we will notify you by phone as soon as possible.  Submit refill requests through [employer] or call your pharmacy and they will forward the refill request to us. Please allow 3 business days for your refill to be completed.          Additional Information About Your Visit        Care EveryWhere ID     This is your Care EveryWhere ID. This could be used by other organizations to access your [place] medical records  [id number]        Your Vitals Were     Pulse Temperature Respirations Height Pulse Oximetry Breastfeeding?    71 96.4  F (35.8  C) (Tympanic) 18 5' 3.25\" (1.607 m) 99% No    BMI (Body Mass Index)                   29.91 kg/m2            Blood Pressure from Last 3 Encounters:   09/26/17 138/66   09/20/17 126/72   09/13/17 128/70    Weight from Last 3 Encounters:   09/26/17 170 lb " 3.2 oz (77.2 kg)   09/20/17 170 lb (77.1 kg)   09/13/17 169 lb 6.4 oz (76.8 kg)              We Performed the Following     **A1C FUTURE 3mo     TSH with free T4 reflex        Primary Care Provider Office Phone # Fax #    Heather Waller [phone]       [address] [facility] [address]        Equal Access to Services     DEBORA WISEMAN : Hadii aad ku hadasho Soomaali, waaxda luqadaha, qaybta kaalmada adeegyada, waxay idiin hayaan adeeg flaquito laJanessaoxanan . So [facility] [phone].    ATENCIÓN: Si habla espwendy, tiene a umana disposición servicios gratuitos de asistencia lingüística. Llame al [phone].    We comply with applicable federal civil rights laws and [place] laws. We do not discriminate on the basis of race, color, national origin, age, disability sex, sexual orientation or gender identity.            Thank you!     Thank you for choosing [facility]  for your care. Our goal is always to provide you with excellent care. Hearing back from our patients is one way we can continue to improve our services. Please take a few minutes to complete the written survey that you may receive in the mail after your visit with us. Thank you!             Your Updated Medication List - Protect others around you: Learn how to safely use, store and throw away your medicines at www.disposemymeds.org.          This list is accurate as of: 9/26/17  8:35 AM.  Always use your most recent med list.                   Brand Name Dispense Instructions for use Diagnosis    ascorbic acid 500 MG tablet    VITAMIN C    3 MONTHS    ONE TABLET DAILY        aspirin 81 MG tablet      Take 1 tablet by mouth daily.        CENTRUM SILVER per tablet     90 Tab    ONE DAILY        CRANBERRY           estradiol 0.5 MG tablet    ESTRACE    12 tablet    Place 1 tablet in the vagina once weekly.    Vaginal prolapse       lisinopril-hydrochlorothiazide 10-12.5 MG per tablet    PRINZIDE/ZESTORETIC    90 tablet    Take 1  tablet by mouth daily    Essential hypertension, benign       nystatin cream    MYCOSTATIN    30 g    Apply thin layer to rash 3 times a day for 14 days, then as needed    Candidiasis of skin       VITAMIN D PO      Take 1 tablet by mouth daily.

## 2017-09-26 NOTE — PATIENT INSTRUCTIONS
We will call you with the results of your labs       Preventive Health Recommendations    Female Ages 65 +    Yearly exam:     See your health care provider every year in order to  o Review health changes.   o Discuss preventive care.    o Review your medicines if your doctor has prescribed any.      You no longer need a yearly Pap test unless you've had an abnormal Pap test in the past 10 years. If you have vaginal symptoms, such as bleeding or discharge, be sure to talk with your provider about a Pap test.      Every 1 to 2 years, have a mammogram.  If you are over 69, talk with your health care provider about whether or not you want to continue having screening mammograms.      Every 10 years, have a colonoscopy. Or, have a yearly FIT test (stool test). These exams will check for colon cancer.       Have a cholesterol test every 5 years, or more often if your doctor advises it.       Have a diabetes test (fasting glucose) every three years. If you are at risk for diabetes, you should have this test more often.       At age 65, have a bone density scan (DEXA) to check for osteoporosis (brittle bone disease).    Shots:    Get a flu shot each year.    Get a tetanus shot every 10 years.    Talk to your doctor about your pneumonia vaccines. There are now two you should receive - Pneumovax (PPSV 23) and Prevnar (PCV 13).    Talk to your doctor about the shingles vaccine.    Talk to your doctor about the hepatitis B vaccine.    Nutrition:     Eat at least 5 servings of fruits and vegetables each day.      Eat whole-grain bread, whole-wheat pasta and brown rice instead of white grains and rice.      Talk to your provider about Calcium and Vitamin D.     Lifestyle    Exercise at least 150 minutes a week (30 minutes a day, 5 days a week). This will help you control your weight and prevent disease.      Limit alcohol to one drink per day.      No smoking.       Wear sunscreen to prevent skin cancer.       See your dentist  twice a year for an exam and cleaning.      See your eye doctor every 1 to 2 years to screen for conditions such as glaucoma, macular degeneration and cataracts.

## 2017-12-18 ENCOUNTER — RADIANT APPOINTMENT (OUTPATIENT)
Dept: MAMMOGRAPHY | Facility: CLINIC | Age: 72
End: 2017-12-18
Attending: FAMILY MEDICINE
Payer: MEDICARE

## 2017-12-18 DIAGNOSIS — Z12.31 VISIT FOR SCREENING MAMMOGRAM: ICD-10-CM

## 2017-12-18 PROCEDURE — G0202 SCR MAMMO BI INCL CAD: HCPCS | Mod: TC

## 2018-01-17 ENCOUNTER — OFFICE VISIT (OUTPATIENT)
Dept: OBGYN | Facility: CLINIC | Age: 73
End: 2018-01-17
Payer: MEDICARE

## 2018-01-17 VITALS
HEART RATE: 74 BPM | RESPIRATION RATE: 14 BRPM | WEIGHT: 167 LBS | BODY MASS INDEX: 29.59 KG/M2 | HEIGHT: 63 IN | SYSTOLIC BLOOD PRESSURE: 134 MMHG | DIASTOLIC BLOOD PRESSURE: 80 MMHG | TEMPERATURE: 97.6 F

## 2018-01-17 DIAGNOSIS — Z46.89 PESSARY MAINTENANCE: Primary | ICD-10-CM

## 2018-01-17 PROCEDURE — 99213 OFFICE O/P EST LOW 20 MIN: CPT | Performed by: OBSTETRICS & GYNECOLOGY

## 2018-01-17 NOTE — MR AVS SNAPSHOT
"              After Visit Summary   2018    Mary Art    MRN: [id number]           Patient Information     Date Of Birth          1945        Visit Information        Provider Department      2018 9:15 AM Yue Reese MD [facility]        Today's Diagnoses     Pessary maintenance    -  1       Follow-ups after your visit        Who to contact     If you have questions or need follow up information about today's clinic visit or your schedule please contact [facility] directly at [phone].  Normal or non-critical lab and imaging results will be communicated to you by [facility], letter or phone within 4 business days after the clinic has received the results. If you do not hear from us within 7 days, please contact the clinic through [facility] or phone. If you have a critical or abnormal lab result, we will notify you by phone as soon as possible.  Submit refill requests through [employer] or call your pharmacy and they will forward the refill request to us. Please allow 3 business days for your refill to be completed.          Additional Information About Your Visit        MyChart Information     [employer] lets you send messages to your doctor, view your test results, renew your prescriptions, schedule appointments and more. To sign up, go to [email or web address] . Click on \"Log in\" on the left side of the screen, which will take you to the Welcome page. Then click on \"Sign up Now\" on the right side of the page.     You will be asked to enter the access code listed below, as well as some personal information. Please follow the directions to create your username and password.     Your access code is: [email or web address]  Expires: 2018  9:31 AM     Your access code will  in 90 days. If you need help or a new code, please call your [facility] or [phone].        Care EveryWhere ID     This is your Care EveryWhere ID. This could be used by " "other organizations to access your [place] medical records  [id number]        Your Vitals Were     Pulse Temperature Respirations Height BMI (Body Mass Index)       74 97.6  F (36.4  C) (Tympanic) 14 5' 3.25\" (1.607 m) 29.35 kg/m2        Blood Pressure from Last 3 Encounters:   01/17/18 134/80   09/26/17 138/66   09/20/17 126/72    Weight from Last 3 Encounters:   01/17/18 167 lb (75.8 kg)   09/26/17 170 lb 3.2 oz (77.2 kg)   09/20/17 170 lb (77.1 kg)              Today, you had the following     No orders found for display       Primary Care Provider Office Phone # Fax #    Heather Waller [phone] [address] [place] [address]        Equal Access to Services     [facility] HospitalNE[facility] : Hadii benitez burgess Soisaiah, waaxda luqadaha, qaybta kaalmada adecamilayada, josee jones . So [facility] [phone].    ATENCIÓN: Si habla español, tiene a umana disposición servicios gratuitos de asistencia lingüística. Llame al [phone].    We comply with applicable federal civil rights laws and [place] laws. We do not discriminate on the basis of race, color, national origin, age, disability, sex, sexual orientation, or gender identity.            Thank you!     Thank you for choosing [facility]  for your care. Our goal is always to provide you with excellent care. Hearing back from our patients is one way we can continue to improve our services. Please take a few minutes to complete the written survey that you may receive in the mail after your visit with us. Thank you!             Your Updated Medication List - Protect others around you: Learn how to safely use, store and throw away your medicines at www.disposemymeds.org.          This list is accurate as of: 1/17/18  9:31 AM.  Always use your most recent med list.                   Brand Name Dispense Instructions for use Diagnosis    ascorbic acid 500 MG tablet    VITAMIN C    3 MONTHS    ONE TABLET DAILY     "    aspirin 81 MG tablet      Take 1 tablet by mouth daily.        CENTRUM SILVER per tablet     90 Tab    ONE DAILY        CRANBERRY           estradiol 0.5 MG tablet    ESTRACE    12 tablet    Place 1 tablet in the vagina once weekly.    Vaginal prolapse       lisinopril-hydrochlorothiazide 10-12.5 MG per tablet    PRINZIDE/ZESTORETIC    90 tablet    Take 1 tablet by mouth daily    Essential hypertension, benign       nystatin cream    MYCOSTATIN    30 g    Apply thin layer to rash 3 times a day for 14 days, then as needed    Candidiasis of skin       VITAMIN D PO      Take 1 tablet by mouth daily.

## 2018-01-17 NOTE — PROGRESS NOTES
Mary is a 72 year old    female who presents for pessary recheck;  she currently has a ring pessary  3 1/2 inch size5 pessary which she keeps in place continuously;  she reports no problems with her pessary, no discharge, no bleeding.. She is using vaginal estrogen once a week.     ROS: 10 point ROS neg other than the symptoms noted above in the HPI.    Past Medical History:   Diagnosis Date     HTN        Past Surgical History:   Procedure Laterality Date     HYSTERECTOMY TOTAL ABDOMINAL      Total abdominal hysterectomy & bilateral salpingo-oophorectomy     KNEE SURGERY      Knee (L)       Current Outpatient Prescriptions   Medication Sig Dispense Refill     lisinopril-hydrochlorothiazide (PRINZIDE/ZESTORETIC) 10-12.5 MG per tablet Take 1 tablet by mouth daily 90 tablet 3     estradiol (ESTRACE) 0.5 MG tablet Place 1 tablet in the vagina once weekly. 12 tablet 3     CRANBERRY        aspirin 81 MG tablet Take 1 tablet by mouth daily.        VITAMIN D PO Take 1 tablet by mouth daily.       CENTRUM SILVER OR TABS ONE DAILY 90 Tab 3     VITAMIN C 500 MG OR TABS ONE TABLET DAILY 3 MONTHS 3     nystatin (MYCOSTATIN) cream Apply thin layer to rash 3 times a day for 14 days, then as needed (Patient not taking: Reported on 2018) 30 g 1       Social History     Social History     Marital status:      Spouse name: N/A     Number of children: N/A     Years of education: N/A     Social History Main Topics     Smoking status: Never Smoker     Smokeless tobacco: Never Used     Alcohol use No     Drug use: No     Sexual activity: Yes     Partners: Male     Other Topics Concern      Service No     Blood Transfusions No     Caffeine Concern No     0--2 cups     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 No     Social History Narrative       Family History   Problem Relation Age of Onset     Hypertension Mother      Hypertension Father      DIABETES Father      Hypertension Sister      Hypertension Brother      C.A.D. No family hx of        OBJECTIVE: /80 (BP Location: Left arm, Patient Position: Chair, Cuff Size: Adult Regular)  Pulse 74  Temp 97.6  F (36.4  C) (Tympanic)  Resp 14  Ht 5' 3.25\" (1.607 m)  Wt 167 lb (75.8 kg)  BMI 29.35 kg/m2  No LMP recorded. Patient has had a hysterectomy.  The pessary was removed with  difficulty, cleaned in warm water and  replaced, after inspection of the vulva and vagina, which showed normal.    ASSESSMENT / PLAN:  (Z46.89) Pessary maintenance  (primary encounter diagnosis)  Comment: no problems  Plan: f/u 6 months for pessary maintenance and prn  Yue Reese MD        "

## 2018-01-17 NOTE — NURSING NOTE
"Chief Complaint   Patient presents with     RECHECK     pessary cleaning and check       Initial /80 (BP Location: Left arm, Patient Position: Chair, Cuff Size: Adult Regular)  Pulse 74  Temp 97.6  F (36.4  C) (Tympanic)  Resp 14  Ht 5' 3.25\" (1.607 m)  Wt 167 lb (75.8 kg)  BMI 29.35 kg/m2 Estimated body mass index is 29.35 kg/(m^2) as calculated from the following:    Height as of this encounter: 5' 3.25\" (1.607 m).    Weight as of this encounter: 167 lb (75.8 kg).  Medication Reconciliation: complete     Darryn Gordon CMA      "

## 2018-07-17 ENCOUNTER — OFFICE VISIT (OUTPATIENT)
Dept: OBGYN | Facility: CLINIC | Age: 73
End: 2018-07-17
Payer: MEDICARE

## 2018-07-17 VITALS
DIASTOLIC BLOOD PRESSURE: 73 MMHG | BODY MASS INDEX: 29.53 KG/M2 | WEIGHT: 168 LBS | SYSTOLIC BLOOD PRESSURE: 124 MMHG | HEART RATE: 65 BPM | TEMPERATURE: 96 F

## 2018-07-17 DIAGNOSIS — Z46.89 PESSARY MAINTENANCE: Primary | ICD-10-CM

## 2018-07-17 PROCEDURE — 99212 OFFICE O/P EST SF 10 MIN: CPT | Performed by: OBSTETRICS & GYNECOLOGY

## 2018-07-17 NOTE — MR AVS SNAPSHOT
After Visit Summary   7/17/2018    Mary Art    MRN: [id number]           Patient Information     Date Of Birth          1945        Visit Information        Provider Department      7/17/2018 9:45 AM Candi Masterson MD [facility]        Today's Diagnoses     Pessary maintenance    -  1       Follow-ups after your visit        Who to contact     If you have questions or need follow up information about today's clinic visit or your schedule please contact [facility] directly at [phone].  Normal or non-critical lab and imaging results will be communicated to you by MyChart, letter or phone within 4 business days after the clinic has received the results. If you do not hear from us within 7 days, please contact the clinic through MyChart or phone. If you have a critical or abnormal lab result, we will notify you by phone as soon as possible.  Submit refill requests through "[employer] or call your pharmacy and they will forward the refill request to us. Please allow 3 business days for your refill to be completed.          Additional Information About Your Visit        Care EveryWhere ID     This is your Care EveryWhere ID. This could be used by other organizations to access your [place] medical records  [id number]        Your Vitals Were     Pulse Temperature Breastfeeding? BMI (Body Mass Index)          65 96  F (35.6  C) (Tympanic) No 29.53 kg/m2         Blood Pressure from Last 3 Encounters:   07/17/18 124/73   01/17/18 134/80   09/26/17 138/66    Weight from Last 3 Encounters:   07/17/18 168 lb (76.2 kg)   01/17/18 167 lb (75.8 kg)   09/26/17 170 lb 3.2 oz (77.2 kg)              We Performed the Following     FIT/INSERT INTRAVAG SUPPORT DEVICE/PESSARY        Primary Care Provider Office Phone # Fax #    Heather Waller [phone] [address] [place] [id number]        Equal Access to Services     DEBORA ALEXANDER: Rommel marquis  bibiana Nicole, waha noekarleyha, qapetersonta kacelso ynesnicki, waxayan idiin hayoxanasalena quickcamila stephensuziedonte jones barb. So [facility] [phone].    ATENCIÓN: Si habla jeremy, tiene a umana disposición servicios gratuitos de asistencia lingüística. Marlene al [phone].    We comply with applicable federal civil rights laws and [place] laws. We do not discriminate on the basis of race, color, national origin, age, disability, sex, sexual orientation, or gender identity.            Thank you!     Thank you for choosing [facility]  for your care. Our goal is always to provide you with excellent care. Hearing back from our patients is one way we can continue to improve our services. Please take a few minutes to complete the written survey that you may receive in the mail after your visit with us. Thank you!             Your Updated Medication List - Protect others around you: Learn how to safely use, store and throw away your medicines at www.disposemymeds.org.          This list is accurate as of 7/17/18  9:50 AM.  Always use your most recent med list.                   Brand Name Dispense Instructions for use Diagnosis    ascorbic acid 500 MG tablet    VITAMIN C    3 MONTHS    ONE TABLET DAILY        aspirin 81 MG tablet      Take 1 tablet by mouth daily.        CENTRUM SILVER per tablet     90 Tab    ONE DAILY        CRANBERRY           estradiol 0.5 MG tablet    ESTRACE    12 tablet    Place 1 tablet in the vagina once weekly.    Vaginal prolapse       lisinopril-hydrochlorothiazide 10-12.5 MG per tablet    PRINZIDE/ZESTORETIC    90 tablet    Take 1 tablet by mouth daily    Essential hypertension, benign       nystatin cream    MYCOSTATIN    30 g    Apply thin layer to rash 3 times a day for 14 days, then as needed    Candidiasis of skin       VITAMIN D PO      Take 1 tablet by mouth daily.

## 2018-07-17 NOTE — PROGRESS NOTES
Pessary Check     Ms. Mary Art 69 year old presents for pessary check. No issues or complaints at this time.      ROS: 10 point ROS neg other than the symptoms noted above in the HPI.     Exam:   /73 (BP Location: Left arm, Patient Position: Chair, Cuff Size: Adult Large)  Pulse 65  Temp 96  F (35.6  C) (Tympanic)  Wt 168 lb (76.2 kg)  Breastfeeding? No  BMI 29.53 kg/m2  General Appearance: NAD  Pelvic: Normal external female genitalia. No external lesions, normal hair distribution, no adenopathy. Speculum exam reveals vaginal epithelium mildly atrophic, no erosions or lesions noted. Bimanual exam revealed absent uterus and no pelvic masses. No pelvic tenderness.      Pessary cleaned and reinserted without incident     A/P: Pessary check  No complications      RTC in 6 months     Candi Masterson MD  [facility]

## 2018-08-03 ENCOUNTER — OFFICE VISIT (OUTPATIENT)
Dept: URGENT CARE | Facility: URGENT CARE | Age: 73
End: 2018-08-03
Payer: MEDICARE

## 2018-08-03 VITALS
DIASTOLIC BLOOD PRESSURE: 72 MMHG | RESPIRATION RATE: 20 BRPM | HEART RATE: 72 BPM | BODY MASS INDEX: 29.35 KG/M2 | WEIGHT: 167 LBS | SYSTOLIC BLOOD PRESSURE: 136 MMHG

## 2018-08-03 DIAGNOSIS — R30.0 DYSURIA: Primary | ICD-10-CM

## 2018-08-03 DIAGNOSIS — N30.01 ACUTE CYSTITIS WITH HEMATURIA: ICD-10-CM

## 2018-08-03 DIAGNOSIS — R82.90 NONSPECIFIC FINDING ON EXAMINATION OF URINE: ICD-10-CM

## 2018-08-03 LAB
ALBUMIN UR-MCNC: >=300 MG/DL
APPEARANCE UR: CLEAR
BACTERIA #/AREA URNS HPF: ABNORMAL /HPF
BILIRUB UR QL STRIP: NEGATIVE
COLOR UR AUTO: YELLOW
GLUCOSE UR STRIP-MCNC: NEGATIVE MG/DL
HGB UR QL STRIP: ABNORMAL
KETONES UR STRIP-MCNC: NEGATIVE MG/DL
LEUKOCYTE ESTERASE UR QL STRIP: ABNORMAL
NITRATE UR QL: POSITIVE
NON-SQ EPI CELLS #/AREA URNS LPF: ABNORMAL /LPF
PH UR STRIP: 6 PH (ref 5–7)
RBC #/AREA URNS AUTO: ABNORMAL /HPF
SOURCE: ABNORMAL
SP GR UR STRIP: >1.03 (ref 1–1.03)
UROBILINOGEN UR STRIP-ACNC: 0.2 EU/DL (ref 0.2–1)
WBC #/AREA URNS AUTO: >100 /HPF

## 2018-08-03 PROCEDURE — 87088 URINE BACTERIA CULTURE: CPT | Performed by: NURSE PRACTITIONER

## 2018-08-03 PROCEDURE — 99213 OFFICE O/P EST LOW 20 MIN: CPT | Performed by: NURSE PRACTITIONER

## 2018-08-03 PROCEDURE — 87186 SC STD MICRODIL/AGAR DIL: CPT | Performed by: NURSE PRACTITIONER

## 2018-08-03 PROCEDURE — 81001 URINALYSIS AUTO W/SCOPE: CPT | Performed by: NURSE PRACTITIONER

## 2018-08-03 PROCEDURE — 87086 URINE CULTURE/COLONY COUNT: CPT | Performed by: NURSE PRACTITIONER

## 2018-08-03 RX ORDER — CIPROFLOXACIN 250 MG/1
250 TABLET, FILM COATED ORAL 2 TIMES DAILY
Qty: 14 TABLET | Refills: 0 | Status: SHIPPED | OUTPATIENT
Start: 2018-08-03 | End: 2018-08-10

## 2018-08-03 NOTE — NURSING NOTE
"Chief Complaint   Patient presents with     UTI     Started couple days ago.  Having some dysuria and frequency.         Initial /72 (BP Location: Right arm, Cuff Size: Adult Regular)  Pulse 72  Resp 20  Wt 167 lb (75.8 kg)  BMI 29.35 kg/m2 Estimated body mass index is 29.35 kg/(m^2) as calculated from the following:    Height as of 1/17/18: 5' 3.25\" (1.607 m).    Weight as of this encounter: 167 lb (75.8 kg).      Health Maintenance that is potentially due pending provider review:  NONE    n/a    Is there anyone who you would like to be able to receive your results? Not Applicable  If yes have patient fill out KATALINA Pacheco M.A.      "

## 2018-08-03 NOTE — PATIENT INSTRUCTIONS
"Antibiotics as directed   Urine culture is pending, will contact you if there is a change in treatment therapy.   Drink plenty of fluids. Prevention and treatment of UTI's discussed.   Signs and symptoms of pyelonephritis mentioned.   RTC if any worsening symptoms or if not improving as expected.     After completion of your antibiotic return for a repeat urinalysis.   You will just need to call the clinic to make a lab only appointment        * BLADDER INFECTION,Female (Adult)    A bladder infection (\"cystitis\" or \"UTI\") usually causes a constant urge to urinate and a burning when passing urine. Urine may be cloudy, smelly or dark. There may be pain in the lower abdomen. A bladder infection occurs when bacteria from the vaginal area enter the bladder opening (urethra). This can occur from sexual intercourse, wearing tight clothing, dehydration and other factors.  HOME CARE:  1. Drink lots of fluids (at least 6-8 glasses a day, unless you must restrict fluids for other medical reasons). This will force the medicine into your urinary system and flush the bacteria out of your body. Cranberry juice has been shown to help clear out the bacteria.  2. Avoid sexual intercourse until your symptoms are gone.  3. A bladder infection is treated with antibiotics. You may also be given Pyridium (generic = phenazopyridine) to reduce the burning sensation. This medicine will cause your urine to become a bright orange color. The orange urine may stain clothing. You may wear a pad or panty-liner to protect clothing.  PREVENTING FUTURE INFECTIONS:  1. Always wipe from front to back after a bowel movement.  2. Keep the genital area clean and dry.  3. Drink plenty of fluids each day to avoid dehydration.  4. Urinate right after intercourse to flush out the bladder.  5. Wear cotton underwear and cotton-lined panty hose; avoid tight-fitting pants.  6. If you are on birth control pills and are having frequent bladder infections, discuss " with your doctor.  FOLLOW UP: Return to this facility or see your doctor if ALL symptoms are not gone after three days of treatment.  GET PROMPT MEDICAL ATTENTION if any of the following occur:    Fever over 101 F (38.3 C)    No improvement by the third day of treatment    Increasing back or abdominal pain    Repeated vomiting; unable to keep medicine down    Weakness, dizziness or fainting    Vaginal discharge    Pain, redness or swelling in the labia (outer vaginal area)    9578-2006 [employer]. [address] [place] [address], [place] [address]. All rights reserved. This information is not intended as a substitute for professional medical care. Always follow your healthcare professional's instructions.  This information has been modified by your health care provider with permission from the publisher.    Urinary Tract Infections in Women    Urinary tract infections (UTIs) are most often caused by bacteria. These bacteria enter the urinary tract. The bacteria may come from outside the body. Or they may travel from the skin outside the rectum or vagina into the urethra. Female anatomy makes it easy for bacteria from the bowel to enter a woman s urinary tract, which is the most common source of UTI. This means women develop UTIs more often than men. Pain in or around the urinary tract is a common UTI symptom. But the only way to know for sure if you have a UTI for the healthcare provider to test your urine. The two tests that may be done are the urinalysis and urine culture.  Types of UTIs    Cystitis. A bladder infection (cystitis) is the most common UTI in women. You may have urgent or frequent urination. You may also have pain, burning when you urinate, and bloody urine.    Urethritis. This is an inflamed urethra, which is the tube that carries urine from the bladder to outside the body. You may have lower stomach or back pain. You may also have urgent or frequent urination.    Pyelonephritis. This is a  kidney infection. If not treated, it can be serious and damage your kidneys. In severe cases, you may need to stay in the hospital. You may have a fever and lower back pain.  Medicines to treat a UTI  Most UTIs are treated with antibiotics. These kill the bacteria. The length of time you need to take them depends on the type of infection. It may be as short as 3 days. If you have repeated UTIs, you may need a low-dose antibiotic for several months. Take antibiotics exactly as directed. Don t stop taking them until all of the medicine is gone. If you stop taking the antibiotic too soon, the infection may not go away. You may also develop a resistance to the antibiotic. This can make it much harder to treat.  Lifestyle changes to treat and prevent UTIs  The lifestyle changes below will help get rid of your UTI. They may also help prevent future UTIs.    Drink plenty of fluids. This includes water, juice, or other caffeine-free drinks. Fluids help flush bacteria out of your body.    Empty your bladder. Always empty your bladder when you feel the urge to urinate. And always urinate before going to sleep. Urine that stays in your bladder can lead to infection. Try to urinate before and after sex as well.    Practice good personal hygiene. Wipe yourself from front to back after using the toilet. This helps keep bacteria from getting into the urethra.    Use condoms during sex. These help prevent UTIs caused by sexually transmitted bacteria. Also don't use spermicides during sex. These can increase the risk for UTIs. Choose other forms of birth control instead. For women who tend to get UTIs after sex, a low-dose of a preventive antibiotic may be used. Be sure to discuss this option with your healthcare provider.    Follow up with your healthcare provider as directed. He or she may test to make sure the infection has cleared. If needed, more treatment may be started.  Date Last Reviewed: 1/1/2017 2000-2017 [employer]. [address] [place] [address], [place] [address]. All rights reserved. This information is not intended as a substitute for professional medical care. Always follow your healthcare professional's instructions.

## 2018-08-03 NOTE — MR AVS SNAPSHOT
"              After Visit Summary   8/3/2018    Mary Art    MRN: [id number]           Patient Information     Date Of Birth          1945        Visit Information        Provider Department      8/3/2018 5:10 PM Cadni De Souza APRN CNP [facility]        Today's Diagnoses     Dysuria    -  1    Nonspecific finding on examination of urine        Acute cystitis with hematuria          Care Instructions    Antibiotics as directed   Urine culture is pending, will contact you if there is a change in treatment therapy.   Drink plenty of fluids. Prevention and treatment of UTI's discussed.   Signs and symptoms of pyelonephritis mentioned.   RTC if any worsening symptoms or if not improving as expected.     After completion of your antibiotic return for a repeat urinalysis.   You will just need to call the clinic to make a lab only appointment        * BLADDER INFECTION,Female (Adult)    A bladder infection (\"cystitis\" or \"UTI\") usually causes a constant urge to urinate and a burning when passing urine. Urine may be cloudy, smelly or dark. There may be pain in the lower abdomen. A bladder infection occurs when bacteria from the vaginal area enter the bladder opening (urethra). This can occur from sexual intercourse, wearing tight clothing, dehydration and other factors.  HOME CARE:  1. Drink lots of fluids (at least 6-8 glasses a day, unless you must restrict fluids for other medical reasons). This will force the medicine into your urinary system and flush the bacteria out of your body. Cranberry juice has been shown to help clear out the bacteria.  2. Avoid sexual intercourse until your symptoms are gone.  3. A bladder infection is treated with antibiotics. You may also be given Pyridium (generic = phenazopyridine) to reduce the burning sensation. This medicine will cause your urine to become a bright orange color. The orange urine may stain clothing. You may wear a pad or " panty-liner to protect clothing.  PREVENTING FUTURE INFECTIONS:  1. Always wipe from front to back after a bowel movement.  2. Keep the genital area clean and dry.  3. Drink plenty of fluids each day to avoid dehydration.  4. Urinate right after intercourse to flush out the bladder.  5. Wear cotton underwear and cotton-lined panty hose; avoid tight-fitting pants.  6. If you are on birth control pills and are having frequent bladder infections, discuss with your doctor.  FOLLOW UP: Return to this facility or see your doctor if ALL symptoms are not gone after three days of treatment.  GET PROMPT MEDICAL ATTENTION if any of the following occur:    Fever over 101 F (38.3 C)    No improvement by the third day of treatment    Increasing back or abdominal pain    Repeated vomiting; unable to keep medicine down    Weakness, dizziness or fainting    Vaginal discharge    Pain, redness or swelling in the labia (outer vaginal area)    0783-8877 [employer]. [address] [place] [address]. All rights reserved. This information is not intended as a substitute for professional medical care. Always follow your healthcare professional's instructions.  This information has been modified by your health care provider with permission from the publisher.    Urinary Tract Infections in Women    Urinary tract infections (UTIs) are most often caused by bacteria. These bacteria enter the urinary tract. The bacteria may come from outside the body. Or they may travel from the skin outside the rectum or vagina into the urethra. Female anatomy makes it easy for bacteria from the bowel to enter a woman s urinary tract, which is the most common source of UTI. This means women develop UTIs more often than men. Pain in or around the urinary tract is a common UTI symptom. But the only way to know for sure if you have a UTI for the healthcare provider to test your urine. The two tests that may be done are the urinalysis and  urine culture.  Types of UTIs    Cystitis. A bladder infection (cystitis) is the most common UTI in women. You may have urgent or frequent urination. You may also have pain, burning when you urinate, and bloody urine.    Urethritis. This is an inflamed urethra, which is the tube that carries urine from the bladder to outside the body. You may have lower stomach or back pain. You may also have urgent or frequent urination.    Pyelonephritis. This is a kidney infection. If not treated, it can be serious and damage your kidneys. In severe cases, you may need to stay in the hospital. You may have a fever and lower back pain.  Medicines to treat a UTI  Most UTIs are treated with antibiotics. These kill the bacteria. The length of time you need to take them depends on the type of infection. It may be as short as 3 days. If you have repeated UTIs, you may need a low-dose antibiotic for several months. Take antibiotics exactly as directed. Don t stop taking them until all of the medicine is gone. If you stop taking the antibiotic too soon, the infection may not go away. You may also develop a resistance to the antibiotic. This can make it much harder to treat.  Lifestyle changes to treat and prevent UTIs  The lifestyle changes below will help get rid of your UTI. They may also help prevent future UTIs.    Drink plenty of fluids. This includes water, juice, or other caffeine-free drinks. Fluids help flush bacteria out of your body.    Empty your bladder. Always empty your bladder when you feel the urge to urinate. And always urinate before going to sleep. Urine that stays in your bladder can lead to infection. Try to urinate before and after sex as well.    Practice good personal hygiene. Wipe yourself from front to back after using the toilet. This helps keep bacteria from getting into the urethra.    Use condoms during sex. These help prevent UTIs caused by sexually transmitted bacteria. Also don't use spermicides during  sex. These can increase the risk for UTIs. Choose other forms of birth control instead. For women who tend to get UTIs after sex, a low-dose of a preventive antibiotic may be used. Be sure to discuss this option with your healthcare provider.    Follow up with your healthcare provider as directed. He or she may test to make sure the infection has cleared. If needed, more treatment may be started.  Date Last Reviewed: 1/1/2017 2000-2017 [employer]. [address] [place] [address]. All rights reserved. This information is not intended as a substitute for professional medical care. Always follow your healthcare professional's instructions.                Follow-ups after your visit        Follow-up notes from your care team     See patient instructions section of the AVS Return if symptoms worsen or fail to improve, for Follow up with your primary care provider.      Who to contact     If you have questions or need follow up information about today's clinic visit or your schedule please contact [facility] directly at [phone].  Normal or non-critical lab and imaging results will be communicated to you by MyChart, letter or phone within 4 business days after the clinic has received the results. If you do not hear from us within 7 days, please contact the clinic through MyChart or phone. If you have a critical or abnormal lab result, we will notify you by phone as soon as possible.  Submit refill requests through [employer] or call your pharmacy and they will forward the refill request to us. Please allow 3 business days for your refill to be completed.          Additional Information About Your Visit        Care EveryWhere ID     This is your Care EveryWhere ID. This could be used by other organizations to access your [facility] medical records  [id number]        Your Vitals Were     Pulse Respirations BMI (Body Mass Index)             72 20 29.35 kg/m2           Blood Pressure from Last 3 Encounters:   08/03/18 136/72   07/17/18 124/73   01/17/18 134/80    Weight from Last 3 Encounters:   08/03/18 167 lb (75.8 kg)   07/17/18 168 lb (76.2 kg)   01/17/18 167 lb (75.8 kg)              We Performed the Following     *UA reflex to Microscopic and Culture ([place] and [facility] (except [place] and [place])     Urine Culture Aerobic Bacterial     Urine Microscopic          Today's Medication Changes          These changes are accurate as of 8/3/18  5:50 PM.  If you have any questions, ask your nurse or doctor.               Start taking these medicines.        Dose/Directions    ciprofloxacin 250 MG tablet   Commonly known as:  CIPRO   Used for:  Acute cystitis with hematuria   Started by:  Candi De Souza APRN CNP        Dose:  250 mg   Take 1 tablet (250 mg) by mouth 2 times daily for 7 days   Quantity:  14 tablet   Refills:  0            Where to get your medicines      These medications were sent to [facility] [address] [address]     Phone:  [phone]     ciprofloxacin 250 MG tablet                Primary Care Provider Office Phone # Fax #    Heather Waller [phone]       [address] [place] [address]        Equal Access to Services     DEBORA WISEMAN AH: Hadii benitez mccarty hadasho Soomaali, waaxda luqadaha, qaybta kaalmada adeegyada, josee gomezin haynusrat day. So [facility] [phone].    ATENCIÓN: Si habla español, tiene a umana disposición servicios gratuitos de asistencia lingüística. [facility] [phone].    We comply with applicable federal civil rights laws and [place] laws. We do not discriminate on the basis of race, color, national origin, age, disability, sex, sexual orientation, or gender identity.            Thank you!     Thank you for choosing [facility]  for your care. Our goal is always to provide  you with excellent care. Hearing back from our patients is one way we can continue to improve our services. Please take a few minutes to complete the written survey that you may receive in the mail after your visit with us. Thank you!             Your Updated Medication List - Protect others around you: Learn how to safely use, store and throw away your medicines at www.disposemymeds.org.          This list is accurate as of 8/3/18  5:50 PM.  Always use your most recent med list.                   Brand Name Dispense Instructions for use Diagnosis    ascorbic acid 500 MG tablet    VITAMIN C    3 MONTHS    ONE TABLET DAILY        aspirin 81 MG tablet      Take 1 tablet by mouth daily.        CENTRUM SILVER per tablet     90 Tab    ONE DAILY        ciprofloxacin 250 MG tablet    CIPRO    14 tablet    Take 1 tablet (250 mg) by mouth 2 times daily for 7 days    Acute cystitis with hematuria       CRANBERRY           estradiol 0.5 MG tablet    ESTRACE    12 tablet    Place 1 tablet in the vagina once weekly.    Vaginal prolapse       lisinopril-hydrochlorothiazide 10-12.5 MG per tablet    PRINZIDE/ZESTORETIC    90 tablet    Take 1 tablet by mouth daily    Essential hypertension, benign       nystatin cream    MYCOSTATIN    30 g    Apply thin layer to rash 3 times a day for 14 days, then as needed    Candidiasis of skin       VITAMIN D PO      Take 1 tablet by mouth daily.

## 2018-08-03 NOTE — PROGRESS NOTES
SUBJECTIVE:   Mary Art is a 72 year old female who presents to clinic today for the following health issues:  Chief Complaint   Patient presents with     UTI     Started couple days ago.  Having some dysuria and frequency.                   Problem list and histories reviewed & adjusted, as indicated.  Additional history: as documented    Patient Active Problem List   Diagnosis     Essential hypertension, benign     CARDIOVASCULAR SCREENING; LDL GOAL LESS THAN 130     History of elevated glucose     UTI (urinary tract infection)     HL (hearing loss)     Vaginal prolapse     Pessary maintenance     Advanced directives, counseling/discussion     Screening for osteoporosis     Axillary adenopathy     Family history of colon cancer; sister, dx 2014, pt with no screening yet     Parent refuses immunizations     Past Surgical History:   Procedure Laterality Date     HYSTERECTOMY TOTAL ABDOMINAL  1991    Total abdominal hysterectomy & bilateral salpingo-oophorectomy     KNEE SURGERY  2003    Knee (L)       Social History   Substance Use Topics     Smoking status: Never Smoker     Smokeless tobacco: Never Used     Alcohol use No     Family History   Problem Relation Age of Onset     Hypertension Mother      Hypertension Father      Diabetes Father      Hypertension Sister      Hypertension Brother      C.A.D. No family hx of          Current Outpatient Prescriptions   Medication Sig Dispense Refill     aspirin 81 MG tablet Take 1 tablet by mouth daily.        CENTRUM SILVER OR TABS ONE DAILY 90 Tab 3     ciprofloxacin (CIPRO) 250 MG tablet Take 1 tablet (250 mg) by mouth 2 times daily for 7 days 14 tablet 0     CRANBERRY        estradiol (ESTRACE) 0.5 MG tablet Place 1 tablet in the vagina once weekly. 12 tablet 3     lisinopril-hydrochlorothiazide (PRINZIDE/ZESTORETIC) 10-12.5 MG per tablet Take 1 tablet by mouth daily 90 tablet 3     nystatin (MYCOSTATIN) cream Apply thin layer to rash 3 times a day for 14 days,  then as needed 30 g 1     VITAMIN C 500 MG OR TABS ONE TABLET DAILY 3 MONTHS 3     VITAMIN D PO Take 1 tablet by mouth daily.       Allergies   Allergen Reactions     Keflex [Cephalexin Monohydrate] Rash     Penicillins Hives     Labs reviewed in EPIC    Reviewed and updated as needed this visit by clinical staff  Tobacco  Allergies  Meds  Problems  Med Hx  Surg Hx  Fam Hx  Soc Hx        Reviewed and updated as needed this visit by Provider  Allergies  Meds  Problems         ROS:  Constitutional, HEENT, cardiovascular, pulmonary, GI, , musculoskeletal, neuro, skin, endocrine and psych systems are negative, except as otherwise noted.    OBJECTIVE:     /72 (BP Location: Right arm, Cuff Size: Adult Regular)  Pulse 72  Resp 20  Wt 167 lb (75.8 kg)  BMI 29.35 kg/m2  Body mass index is 29.35 kg/(m^2).   GENERAL: healthy, alert and no distress, nontoxic in appearance  EYES: Eyes grossly normal to inspection, PERRL and conjunctivae and sclerae normal  HENT: normocephalic  NECK: no adenopathy, supple with full ROM  RESP: lungs clear to auscultation - no rales, rhonchi or wheezes  CV: regular rate and rhythm, normal S1 S2, no S3 or S4, no murmur, click or rub, no peripheral edema  ABDOMEN: soft, nontender, no hepatosplenomegaly, no masses and bowel sounds normal  MS: no gross musculoskeletal defects noted, no edema  Neg CVA tenderness    Diagnostic Test Results:  Results for orders placed or performed in visit on 08/03/18 (from the past 24 hour(s))   *UA reflex to Microscopic and Culture ([place] and [facility] (except [place] and [place])   Result Value Ref Range    Color Urine Yellow     Appearance Urine Clear     Glucose Urine Negative NEG^Negative mg/dL    Bilirubin Urine Negative NEG^Negative    Ketones Urine Negative NEG^Negative mg/dL    Specific Gravity Urine >1.030 1.003 - 1.035    Blood Urine Large (A) NEG^Negative    pH Urine 6.0 5.0 - 7.0 pH    Protein Albumin Urine >=300 (A)  "NEG^Negative mg/dL    Urobilinogen Urine 0.2 0.2 - 1.0 EU/dL    Nitrite Urine Positive (A) NEG^Negative    Leukocyte Esterase Urine Large (A) NEG^Negative    Source Midstream Urine    Urine Microscopic   Result Value Ref Range    WBC Urine >100 (A) OTO5^0 - 5 /HPF    RBC Urine 5-10 (A) OTO2^O - 2 /HPF    Squamous Epithelial /LPF Urine Few FEW^Few /LPF    Bacteria Urine Moderate (A) NEG^Negative /HPF       ASSESSMENT/PLAN:     Problem List Items Addressed This Visit     UTI (urinary tract infection)    Relevant Medications    ciprofloxacin (CIPRO) 250 MG tablet      Other Visit Diagnoses     Dysuria    -  Primary    Relevant Orders    *UA reflex to Microscopic and Culture ([place] and [facility] (except [place] and [place]) (Completed)    Urine Microscopic (Completed)    Nonspecific finding on examination of urine        Relevant Orders    Urine Culture Aerobic Bacterial (Completed)               Patient Instructions   Antibiotics as directed   Urine culture is pending, will contact you if there is a change in treatment therapy.   Drink plenty of fluids. Prevention and treatment of UTI's discussed.   Signs and symptoms of pyelonephritis mentioned.   RTC if any worsening symptoms or if not improving as expected.     After completion of your antibiotic return for a repeat urinalysis.   You will just need to call the clinic to make a lab only appointment        * BLADDER INFECTION,Female (Adult)    A bladder infection (\"cystitis\" or \"UTI\") usually causes a constant urge to urinate and a burning when passing urine. Urine may be cloudy, smelly or dark. There may be pain in the lower abdomen. A bladder infection occurs when bacteria from the vaginal area enter the bladder opening (urethra). This can occur from sexual intercourse, wearing tight clothing, dehydration and other factors.  HOME CARE:  1. Drink lots of fluids (at least 6-8 glasses a day, unless you must restrict fluids for other medical reasons). This " will force the medicine into your urinary system and flush the bacteria out of your body. Cranberry juice has been shown to help clear out the bacteria.  2. Avoid sexual intercourse until your symptoms are gone.  3. A bladder infection is treated with antibiotics. You may also be given Pyridium (generic = phenazopyridine) to reduce the burning sensation. This medicine will cause your urine to become a bright orange color. The orange urine may stain clothing. You may wear a pad or panty-liner to protect clothing.  PREVENTING FUTURE INFECTIONS:  1. Always wipe from front to back after a bowel movement.  2. Keep the genital area clean and dry.  3. Drink plenty of fluids each day to avoid dehydration.  4. Urinate right after intercourse to flush out the bladder.  5. Wear cotton underwear and cotton-lined panty hose; avoid tight-fitting pants.  6. If you are on birth control pills and are having frequent bladder infections, discuss with your doctor.  FOLLOW UP: Return to this facility or see your doctor if ALL symptoms are not gone after three days of treatment.  GET PROMPT MEDICAL ATTENTION if any of the following occur:    Fever over 101 F (38.3 C)    No improvement by the third day of treatment    Increasing back or abdominal pain    Repeated vomiting; unable to keep medicine down    Weakness, dizziness or fainting    Vaginal discharge    Pain, redness or swelling in the labia (outer vaginal area)    6893-1815 [employer]. [address] [place] [address]. All rights reserved. This information is not intended as a substitute for professional medical care. Always follow your healthcare professional's instructions.  This information has been modified by your health care provider with permission from the publisher.    Urinary Tract Infections in Women    Urinary tract infections (UTIs) are most often caused by bacteria. These bacteria enter the urinary tract. The bacteria may come from outside the  body. Or they may travel from the skin outside the rectum or vagina into the urethra. Female anatomy makes it easy for bacteria from the bowel to enter a woman s urinary tract, which is the most common source of UTI. This means women develop UTIs more often than men. Pain in or around the urinary tract is a common UTI symptom. But the only way to know for sure if you have a UTI for the healthcare provider to test your urine. The two tests that may be done are the urinalysis and urine culture.  Types of UTIs    Cystitis. A bladder infection (cystitis) is the most common UTI in women. You may have urgent or frequent urination. You may also have pain, burning when you urinate, and bloody urine.    Urethritis. This is an inflamed urethra, which is the tube that carries urine from the bladder to outside the body. You may have lower stomach or back pain. You may also have urgent or frequent urination.    Pyelonephritis. This is a kidney infection. If not treated, it can be serious and damage your kidneys. In severe cases, you may need to stay in the hospital. You may have a fever and lower back pain.  Medicines to treat a UTI  Most UTIs are treated with antibiotics. These kill the bacteria. The length of time you need to take them depends on the type of infection. It may be as short as 3 days. If you have repeated UTIs, you may need a low-dose antibiotic for several months. Take antibiotics exactly as directed. Don t stop taking them until all of the medicine is gone. If you stop taking the antibiotic too soon, the infection may not go away. You may also develop a resistance to the antibiotic. This can make it much harder to treat.  Lifestyle changes to treat and prevent UTIs  The lifestyle changes below will help get rid of your UTI. They may also help prevent future UTIs.    Drink plenty of fluids. This includes water, juice, or other caffeine-free drinks. Fluids help flush bacteria out of your body.    Empty your  bladder. Always empty your bladder when you feel the urge to urinate. And always urinate before going to sleep. Urine that stays in your bladder can lead to infection. Try to urinate before and after sex as well.    Practice good personal hygiene. Wipe yourself from front to back after using the toilet. This helps keep bacteria from getting into the urethra.    Use condoms during sex. These help prevent UTIs caused by sexually transmitted bacteria. Also don't use spermicides during sex. These can increase the risk for UTIs. Choose other forms of birth control instead. For women who tend to get UTIs after sex, a low-dose of a preventive antibiotic may be used. Be sure to discuss this option with your healthcare provider.    Follow up with your healthcare provider as directed. He or she may test to make sure the infection has cleared. If needed, more treatment may be started.  Date Last Reviewed: 1/1/2017 2000-2017 [employer]. [address] [place] [address], [place] [address]. All rights reserved. This information is not intended as a substitute for professional medical care. Always follow your healthcare professional's instructions.            REYES [facility]

## 2018-08-04 LAB
BACTERIA SPEC CULT: ABNORMAL
Lab: ABNORMAL
SPECIMEN SOURCE: ABNORMAL

## 2018-09-13 ENCOUNTER — OFFICE VISIT (OUTPATIENT)
Dept: FAMILY MEDICINE | Facility: CLINIC | Age: 73
End: 2018-09-13
Payer: MEDICARE

## 2018-09-13 VITALS
HEART RATE: 68 BPM | DIASTOLIC BLOOD PRESSURE: 78 MMHG | SYSTOLIC BLOOD PRESSURE: 124 MMHG | TEMPERATURE: 97.2 F | RESPIRATION RATE: 16 BRPM | WEIGHT: 169 LBS | HEIGHT: 63 IN | OXYGEN SATURATION: 98 % | BODY MASS INDEX: 29.95 KG/M2

## 2018-09-13 DIAGNOSIS — I10 ESSENTIAL HYPERTENSION, BENIGN: ICD-10-CM

## 2018-09-13 DIAGNOSIS — N81.10 VAGINAL PROLAPSE: ICD-10-CM

## 2018-09-13 PROCEDURE — 99213 OFFICE O/P EST LOW 20 MIN: CPT | Performed by: FAMILY MEDICINE

## 2018-09-13 RX ORDER — LISINOPRIL/HYDROCHLOROTHIAZIDE 10-12.5 MG
1 TABLET ORAL DAILY
Qty: 90 TABLET | Refills: 3 | Status: SHIPPED | OUTPATIENT
Start: 2018-09-13 | End: 2019-09-04

## 2018-09-13 RX ORDER — ESTRADIOL 0.5 MG/1
TABLET ORAL
Qty: 12 TABLET | Refills: 3 | Status: SHIPPED | OUTPATIENT
Start: 2018-09-13 | End: 2019-09-04

## 2018-09-13 ASSESSMENT — PAIN SCALES - GENERAL: PAINLEVEL: NO PAIN (0)

## 2018-09-13 NOTE — NURSING NOTE
Chief Complaint   Patient presents with     Hypertension     Patient is not checking blood pressures at home.     Medication Refill     Refill on estradiol.     Jessica IRWIN CMA

## 2018-09-13 NOTE — MR AVS SNAPSHOT
"              After Visit Summary   9/13/2018    Mary Art    MRN: [id number]           Patient Information     Date Of Birth          1945        Visit Information        Provider Department      9/13/2018 10:00 AM Ricardo Vance MD [facility]        Today's Diagnoses     Vaginal prolapse        BENIGN HYPERTENSION          Care Instructions    1. Your bp is excellent     2. Stay with the current meds    3. Return in one year.           Follow-ups after your visit        Who to contact     If you have questions or need follow up information about today's clinic visit or your schedule please contact [facility] directly at [phone].  Normal or non-critical lab and imaging results will be communicated to you by MyChart, letter or phone within 4 business days after the clinic has received the results. If you do not hear from us within 7 days, please contact the clinic through MyChart or phone. If you have a critical or abnormal lab result, we will notify you by phone as soon as possible.  Submit refill requests through [employer] or call your pharmacy and they will forward the refill request to us. Please allow 3 business days for your refill to be completed.          Additional Information About Your Visit        Care EveryWhere ID     This is your Care EveryWhere ID. This could be used by other organizations to access your [facility] medical records  [id number]        Your Vitals Were     Pulse Temperature Respirations Height Pulse Oximetry BMI (Body Mass Index)    68 97.2  F (36.2  C) (Tympanic) 16 5' 3.25\" (1.607 m) 98% 29.7 kg/m2       Blood Pressure from Last 3 Encounters:   09/13/18 124/78   08/03/18 136/72   07/17/18 124/73    Weight from Last 3 Encounters:   09/13/18 169 lb (76.7 kg)   08/03/18 167 lb (75.8 kg)   07/17/18 168 lb (76.2 kg)              Today, you had the following     No orders found for display         Where to get your medicines "      These [place] [facility] [place] [address] [address]     Phone:  [phone]     estradiol 0.5 MG tablet    lisinopril-hydrochlorothiazide 10-12.5 MG per tablet          Primary Care Provider Office Phone # Fax #    Heather Waller [phone]       [address] [facility] [address]        Equal Access to Services     DEBORA WISEMAN : Hadii aad ku hadasho Soomaali, waaxda luqadaha, qaybta kaalmada adeegyada, waxay idiin hayaan adeeg kharadonte lagokul . So [facility] [phone].    ATENCIÓN: Si habla español, tiene a umana disposición servicios gratuitos de asistencia lingüística. [facility] [phone].    We comply with applicable federal civil rights laws and [place] laws. We do not discriminate on the basis of race, color, national origin, age, disability, sex, sexual orientation, or gender identity.            Thank you!     Thank you for choosing [facility]  for your care. Our goal is always to provide you with excellent care. Hearing back from our patients is one way we can continue to improve our services. Please take a few minutes to complete the written survey that you may receive in the mail after your visit with us. Thank you!             Your Updated Medication List - Protect others around you: Learn how to safely use, store and throw away your medicines at www.disposemymeds.org.          This list is accurate as of 9/13/18 10:05 AM.  Always use your most recent med list.                   Brand Name Dispense Instructions for use Diagnosis    ascorbic acid 500 MG tablet    VITAMIN C    3 MONTHS    ONE TABLET DAILY        aspirin 81 MG tablet      Take 1 tablet by mouth daily.        CENTRUM SILVER per tablet     90 Tab    ONE DAILY        CRANBERRY           estradiol 0.5 MG tablet    ESTRACE    12 tablet    Place 1 tablet in the vagina once weekly.    Vaginal prolapse        lisinopril-hydrochlorothiazide 10-12.5 MG per tablet    PRINZIDE/ZESTORETIC    90 tablet    Take 1 tablet by mouth daily    Essential hypertension, benign       nystatin cream    MYCOSTATIN    30 g    Apply thin layer to rash 3 times a day for 14 days, then as needed    Candidiasis of skin       VITAMIN D PO      Take 1 tablet by mouth daily.

## 2018-09-13 NOTE — PROGRESS NOTES
SUBJECTIVE:   Mary Art is a 72 year old female who presents to clinic today for the following health issues:      Hypertension Follow-up  Good readings today   No problems   meds refill       Outpatient blood pressures are not being checked.    Low Salt Diet: no added salt      Amount of exercise or physical activity: 2-3 days/week for an average of 15-30 minutes    Problems taking medications regularly: No    Medication side effects: none    Diet: regular (no restrictions)            Problem list and histories reviewed & adjusted, as indicated.  Additional history: as documented    Patient Active Problem List   Diagnosis     Essential hypertension, benign     CARDIOVASCULAR SCREENING; LDL GOAL LESS THAN 130     History of elevated glucose     UTI (urinary tract infection)     HL (hearing loss)     Vaginal prolapse     Pessary maintenance     Advanced directives, counseling/discussion     Screening for osteoporosis     Axillary adenopathy     Family history of colon cancer; sister, dx 2014, pt with no screening yet     Parent refuses immunizations     Past Surgical History:   Procedure Laterality Date     HYSTERECTOMY TOTAL ABDOMINAL  1991    Total abdominal hysterectomy & bilateral salpingo-oophorectomy     KNEE SURGERY  2003    Knee (L)       Social History   Substance Use Topics     Smoking status: Never Smoker     Smokeless tobacco: Never Used     Alcohol use No     Family History   Problem Relation Age of Onset     Hypertension Mother      Hypertension Father      Diabetes Father      Hypertension Sister      Hypertension Brother      C.A.D. No family hx of          Current Outpatient Prescriptions   Medication Sig Dispense Refill     aspirin 81 MG tablet Take 1 tablet by mouth daily.        CENTRUM SILVER OR TABS ONE DAILY 90 Tab 3     CRANBERRY        estradiol (ESTRACE) 0.5 MG tablet Place 1 tablet in the vagina once weekly. 12 tablet 3     lisinopril-hydrochlorothiazide (PRINZIDE/ZESTORETIC) 10-12.5  "MG per tablet Take 1 tablet by mouth daily 90 tablet 3     nystatin (MYCOSTATIN) cream Apply thin layer to rash 3 times a day for 14 days, then as needed 30 g 1     VITAMIN C 500 MG OR TABS ONE TABLET DAILY 3 MONTHS 3     VITAMIN D PO Take 1 tablet by mouth daily.       [DISCONTINUED] estradiol (ESTRACE) 0.5 MG tablet Place 1 tablet in the vagina once weekly. 12 tablet 3     [DISCONTINUED] lisinopril-hydrochlorothiazide (PRINZIDE/ZESTORETIC) 10-12.5 MG per tablet Take 1 tablet by mouth daily 90 tablet 3     Allergies   Allergen Reactions     Keflex [Cephalexin Monohydrate] Rash     Penicillins Hives       Reviewed and updated as needed this visit by clinical staff  Tobacco  Allergies  Meds  Med Hx  Surg Hx  Fam Hx  Soc Hx      Reviewed and updated as needed this visit by Provider         ROS:  CONSTITUTIONAL: NEGATIVE for fever, chills, change in weight  ENT/MOUTH: NEGATIVE for ear, mouth and throat problems  RESP: NEGATIVE for significant cough or SOB  CV: NEGATIVE for chest pain, palpitations or peripheral edema    OBJECTIVE:     /78  Pulse 68  Temp 97.2  F (36.2  C) (Tympanic)  Resp 16  Ht 5' 3.25\" (1.607 m)  Wt 169 lb (76.7 kg)  SpO2 98%  BMI 29.7 kg/m2  Body mass index is 29.7 kg/(m^2).  GENERAL: healthy, alert and no distress  NECK: no adenopathy, no asymmetry, masses, or scars and thyroid normal to palpation  RESP: lungs clear to auscultation - no rales, rhonchi or wheezes  CV: regular rate and rhythm, normal S1 S2, no S3 or S4, no murmur, click or rub, no peripheral edema and peripheral pulses strong  ABDOMEN: soft, nontender, no hepatosplenomegaly, no masses and bowel sounds normal  MS: no gross musculoskeletal defects noted, no edema        ASSESSMENT/PLAN:             1. Vaginal prolapse    - estradiol (ESTRACE) 0.5 MG tablet; Place 1 tablet in the vagina once weekly.  Dispense: 12 tablet; Refill: 3    2. BENIGN HYPERTENSION    - lisinopril-hydrochlorothiazide (PRINZIDE/ZESTORETIC) " 10-12.5 MG per tablet; Take 1 tablet by mouth daily  Dispense: 90 tablet; Refill: 3    ASSESSMENT/PLAN:      ICD-10-CM    1. Vaginal prolapse N81.10 estradiol (ESTRACE) 0.5 MG tablet   2. BENIGN HYPERTENSION I10 lisinopril-hydrochlorothiazide (PRINZIDE/ZESTORETIC) 10-12.5 MG per tablet       Patient Instructions   1. Your bp is excellent     2. Stay with the current meds    3. Return in one year.           Ricardo Vance MD  [facility]

## 2019-02-11 ENCOUNTER — OFFICE VISIT (OUTPATIENT)
Dept: OBGYN | Facility: CLINIC | Age: 74
End: 2019-02-11
Payer: MEDICARE

## 2019-02-11 VITALS
WEIGHT: 168 LBS | BODY MASS INDEX: 29.77 KG/M2 | HEART RATE: 80 BPM | TEMPERATURE: 96.8 F | RESPIRATION RATE: 18 BRPM | DIASTOLIC BLOOD PRESSURE: 79 MMHG | HEIGHT: 63 IN | SYSTOLIC BLOOD PRESSURE: 141 MMHG

## 2019-02-11 DIAGNOSIS — Z46.89 PESSARY MAINTENANCE: Primary | ICD-10-CM

## 2019-02-11 PROCEDURE — 99213 OFFICE O/P EST LOW 20 MIN: CPT | Performed by: OBSTETRICS & GYNECOLOGY

## 2019-02-11 ASSESSMENT — MIFFLIN-ST. JEOR: SCORE: 1230.13

## 2019-02-11 NOTE — PROGRESS NOTES
Mary is a 73 year old   female who presents for pessary recheck;  she currently has a ring with support pessary which she keeps in place continuously;  she reports no problems with her pessary, no discharge, no bleeding.. She is using vaginal estrogen once a week.    All systems were reviewed and pertinent information in noted in subjective/HPI.    Past Medical History:   Diagnosis Date     HTN        Past Surgical History:   Procedure Laterality Date     HYSTERECTOMY TOTAL ABDOMINAL      Total abdominal hysterectomy & bilateral salpingo-oophorectomy     KNEE SURGERY      Knee (L)       Current Outpatient Medications   Medication Sig Dispense Refill     aspirin 81 MG tablet Take 1 tablet by mouth daily.        CENTRUM SILVER OR TABS ONE DAILY 90 Tab 3     CRANBERRY        estradiol (ESTRACE) 0.5 MG tablet Place 1 tablet in the vagina once weekly. 12 tablet 3     lisinopril-hydrochlorothiazide (PRINZIDE/ZESTORETIC) 10-12.5 MG per tablet Take 1 tablet by mouth daily 90 tablet 3     VITAMIN C 500 MG OR TABS ONE TABLET DAILY 3 MONTHS 3     VITAMIN D PO Take 1 tablet by mouth daily.       nystatin (MYCOSTATIN) cream Apply thin layer to rash 3 times a day for 14 days, then as needed (Patient not taking: Reported on 2019) 30 g 1       Social History     Socioeconomic History     Marital status:      Spouse name: None     Number of children: None     Years of education: None     Highest education level: None   Social Needs     Financial resource strain: None     Food insecurity - worry: None     Food insecurity - inability: None     Transportation needs - medical: None     Transportation needs - non-medical: None   Occupational History     None   Tobacco Use     Smoking status: Never Smoker     Smokeless tobacco: Never Used   Substance and Sexual Activity     Alcohol use: No     Drug use: No     Sexual activity: Yes     Partners: Male   Other Topics Concern      Service No     Blood  "Transfusions No     Caffeine Concern No     Comment: 0--2 cups     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 No   Social History Narrative     None       Family History   Problem Relation Age of Onset     Hypertension Mother      Hypertension Father      Diabetes Father      Hypertension Sister      Hypertension Brother      C.A.D. No family hx of        OBJECTIVE: /79 (BP Location: Right arm, Patient Position: Chair, Cuff Size: Adult Small)   Pulse 80   Temp 96.8  F (36  C) (Tympanic)   Resp 18   Ht 1.607 m (5' 3.25\")   Wt 76.2 kg (168 lb)   BMI 29.53 kg/m    No LMP recorded. Patient has had a hysterectomy.  The pessary was removed without difficulty, cleaned in warm water and then replaced, after inspection of the vulva and vagina, which showed no erosion; well estrogenized.    ASSESSMENT:     ICD-10-CM    1. Pessary maintenance Z46.89          PLAN: RETURN TO CLINIC 3 months for pessary cleaning; continue vaginal estradiol    Bridgette Jeff MD      "

## 2019-04-19 ENCOUNTER — TELEPHONE (OUTPATIENT)
Dept: FAMILY MEDICINE | Facility: CLINIC | Age: 74
End: 2019-04-19

## 2019-04-19 NOTE — TELEPHONE ENCOUNTER
Reason for Call: Request for an order or referral:    Order or referral being requested: Cortisone injections in knees    Date needed: as soon as possible    Has the patient been seen by the PCP for this problem? YES    Additional comments: Has been seen for knees. Pt wants this done before 4/29/19 pt will be leaving for a week at that time.    Phone number Patient can be reached at:  Home number on file [phone] (home)    Best Time:  any    Can we leave a detailed message on this number?  YES    Call taken on 4/19/2019 at 3:21 PM by Alyssa Varghese

## 2019-04-24 ENCOUNTER — OFFICE VISIT (OUTPATIENT)
Dept: FAMILY MEDICINE | Facility: CLINIC | Age: 74
End: 2019-04-24
Payer: MEDICARE

## 2019-04-24 VITALS
OXYGEN SATURATION: 98 % | HEIGHT: 63 IN | WEIGHT: 167 LBS | RESPIRATION RATE: 16 BRPM | SYSTOLIC BLOOD PRESSURE: 141 MMHG | DIASTOLIC BLOOD PRESSURE: 83 MMHG | BODY MASS INDEX: 29.59 KG/M2 | HEART RATE: 76 BPM | TEMPERATURE: 98.5 F

## 2019-04-24 DIAGNOSIS — M17.0 OSTEOARTHRITIS OF BOTH KNEES, UNSPECIFIED OSTEOARTHRITIS TYPE: Primary | ICD-10-CM

## 2019-04-24 PROCEDURE — 20610 DRAIN/INJ JOINT/BURSA W/O US: CPT | Mod: 50 | Performed by: FAMILY MEDICINE

## 2019-04-24 PROCEDURE — 99213 OFFICE O/P EST LOW 20 MIN: CPT | Mod: 25 | Performed by: FAMILY MEDICINE

## 2019-04-24 RX ORDER — TRIAMCINOLONE ACETONIDE 40 MG/ML
40 INJECTION, SUSPENSION INTRA-ARTICULAR; INTRAMUSCULAR ONCE
Status: COMPLETED | OUTPATIENT
Start: 2019-04-24 | End: 2019-04-24

## 2019-04-24 RX ADMIN — TRIAMCINOLONE ACETONIDE 40 MG: 40 INJECTION, SUSPENSION INTRA-ARTICULAR; INTRAMUSCULAR at 17:31

## 2019-04-24 RX ADMIN — TRIAMCINOLONE ACETONIDE 40 MG: 40 INJECTION, SUSPENSION INTRA-ARTICULAR; INTRAMUSCULAR at 17:30

## 2019-04-24 ASSESSMENT — PAIN SCALES - GENERAL: PAINLEVEL: SEVERE PAIN (6)

## 2019-04-24 ASSESSMENT — MIFFLIN-ST. JEOR: SCORE: 1225.6

## 2019-04-24 NOTE — PROGRESS NOTES
"  SUBJECTIVE:   Mary Art is a 73 year old female who presents to clinic today for the following   health issues:  Chief Complaint   Patient presents with     Medication Request      11/4/2015 knee xrays:  IMPRESSION: Severe joint space narrowing noted in both medial knee  compartments with mild osteophyte formation bilaterally. Large left  patellofemoral osteophytes corresponding to severe osteoarthritis.  Small left knee effusion. No acute fracture visible.    Joint Pain - knee pain- bilateral    Onset: going on for years    Description:   Location: left knee and right knee  Character: Dull ache    Intensity: moderate    Progression of Symptoms: worse    Accompanying Signs & Symptoms:  Other symptoms: none    History:   Previous similar pain: YES      Precipitating factors:   Trauma or overuse: YES    Alleviating factors:  Improved by: acetaminophen    Therapies Tried and outcome: unknown    bilateral knee pain for years.    Fell on knee years ago.  Had arthroscopic surgery left knee.    Alleviating factors: Takes acetaminophen 500 daily.  Has not tried other medications.   No knee swelling.   Location: knees \"all over\"    Patient Active Problem List   Diagnosis     Essential hypertension, benign     CARDIOVASCULAR SCREENING; LDL GOAL LESS THAN 130     History of elevated glucose     UTI (urinary tract infection)     HL (hearing loss)     Vaginal prolapse     Pessary maintenance     Advanced directives, counseling/discussion     Screening for osteoporosis     Axillary adenopathy     Family history of colon cancer; sister, dx 2014, pt with no screening yet     Parent refuses immunizations      OBJECTIVE:Blood pressure 141/83, pulse 76, temperature 98.5  F (36.9  C), temperature source Tympanic, resp. rate 16, height 1.607 m (5' 3.25\"), weight 75.8 kg (167 lb), SpO2 98 %, not currently breastfeeding. BMI=Body mass index is 29.35 kg/m .  GENERAL APPEARANCE ADULT: Alert, no acute distress  MS: knee exam " swelling-none, erythema-none, deformity-varus deformity bilateral , tenderness to palpation-minimal right knee, effusion-none, range of motion flexion quite limited bilateral just >90 degrees, extension full     ASSESSMENT:     ICD-10-CM    1. Osteoarthritis of both knees, unspecified osteoarthritis type M17.0 lidocaine 1 % 2 mL     triamcinolone (KENALOG-40) injection 40 mg     lidocaine 1 % 2 mL     triamcinolone (KENALOG-40) injection 40 mg     DRAIN/INJECT LARGE JOINT/BURSA      Knee osteoarthritis.  Both knee joints injected today.   Possible risks of joint injection include; damage to tendons and ligaments, skin atrophy (thinning of skin and lighter skin color) and infection of the joint.     The benefit of injecting the joint is to relieve pain.   Procedure:  The joint was prepped with Betadyne and injected with 1 cc Kenalog (40 mg) and 2 cc 1% xylocaine.  I used sterile technique and anteromedial approach. No complications.    Bandaid dressing was applied.      Sometimes the injected joint feels worse in the first 24 hours, but if the injection is successful, the pain should be better within the next 3 days or so.  Go easy on the joint for the next several days after injection.    If injection is helpful, it could be repeated 3-4 times a year (at least 3 months apart).   Let me know if injection not helping, we can consider other options for treatment.

## 2019-04-24 NOTE — PATIENT INSTRUCTIONS
ASSESSMENT:   Knee osteoarthritis.  Both knee joints injected today.   Possible risks of joint injection include; damage to tendons and ligaments, skin atrophy (thinning of skin and lighter skin color) and infection of the joint.     The benefit of injecting the joint is to relieve pain.   Procedure:  The joint was prepped with Betadyne and injected with 1 cc Kenalog (40 mg) and 2 cc 1% xylocaine.  I used sterile technique and anteromedial approach. No complications.    Bandaid dressing was applied.      Sometimes the injected joint feels worse in the first 24 hours, but if the injection is successful, the pain should be better within the next 3 days or so.  Go easy on the joint for the next several days after injection.    If injection is helpful, it could be repeated 3-4 times a year (at least 3 months apart).   Let me know if injection not helping, we can consider other options for treatment.

## 2019-09-04 ENCOUNTER — OFFICE VISIT (OUTPATIENT)
Dept: FAMILY MEDICINE | Facility: CLINIC | Age: 74
End: 2019-09-04
Payer: MEDICARE

## 2019-09-04 VITALS
SYSTOLIC BLOOD PRESSURE: 136 MMHG | BODY MASS INDEX: 29.95 KG/M2 | HEART RATE: 72 BPM | DIASTOLIC BLOOD PRESSURE: 84 MMHG | HEIGHT: 63 IN | WEIGHT: 169 LBS | TEMPERATURE: 97.8 F | RESPIRATION RATE: 18 BRPM

## 2019-09-04 DIAGNOSIS — I10 ESSENTIAL HYPERTENSION WITH GOAL BLOOD PRESSURE LESS THAN 140/90: ICD-10-CM

## 2019-09-04 DIAGNOSIS — N81.10 VAGINAL PROLAPSE: ICD-10-CM

## 2019-09-04 DIAGNOSIS — Z86.39 HISTORY OF ELEVATED GLUCOSE: ICD-10-CM

## 2019-09-04 DIAGNOSIS — M17.0 OSTEOARTHRITIS OF BOTH KNEES, UNSPECIFIED OSTEOARTHRITIS TYPE: Primary | ICD-10-CM

## 2019-09-04 LAB
ANION GAP SERPL CALCULATED.3IONS-SCNC: 4 MMOL/L (ref 3–14)
BUN SERPL-MCNC: 21 MG/DL (ref 7–30)
CALCIUM SERPL-MCNC: 9.6 MG/DL (ref 8.5–10.1)
CHLORIDE SERPL-SCNC: 103 MMOL/L (ref 94–109)
CO2 SERPL-SCNC: 29 MMOL/L (ref 20–32)
CREAT SERPL-MCNC: 0.58 MG/DL (ref 0.52–1.04)
GFR SERPL CREATININE-BSD FRML MDRD: 90 ML/MIN/{1.73_M2}
GLUCOSE SERPL-MCNC: 109 MG/DL (ref 70–99)
HBA1C MFR BLD: 6.2 % (ref 0–5.6)
POTASSIUM SERPL-SCNC: 4.1 MMOL/L (ref 3.4–5.3)
SODIUM SERPL-SCNC: 136 MMOL/L (ref 133–144)

## 2019-09-04 PROCEDURE — 99213 OFFICE O/P EST LOW 20 MIN: CPT | Mod: 25 | Performed by: FAMILY MEDICINE

## 2019-09-04 PROCEDURE — 20610 DRAIN/INJ JOINT/BURSA W/O US: CPT | Mod: 50 | Performed by: FAMILY MEDICINE

## 2019-09-04 PROCEDURE — 83036 HEMOGLOBIN GLYCOSYLATED A1C: CPT | Performed by: FAMILY MEDICINE

## 2019-09-04 PROCEDURE — 36415 COLL VENOUS BLD VENIPUNCTURE: CPT | Performed by: FAMILY MEDICINE

## 2019-09-04 PROCEDURE — 80048 BASIC METABOLIC PNL TOTAL CA: CPT | Performed by: FAMILY MEDICINE

## 2019-09-04 RX ORDER — TRIAMCINOLONE ACETONIDE 40 MG/ML
40 INJECTION, SUSPENSION INTRA-ARTICULAR; INTRAMUSCULAR ONCE
Status: COMPLETED | OUTPATIENT
Start: 2019-09-04 | End: 2019-09-05

## 2019-09-04 RX ORDER — LISINOPRIL/HYDROCHLOROTHIAZIDE 10-12.5 MG
1 TABLET ORAL DAILY
Qty: 90 TABLET | Refills: 3 | Status: SHIPPED | OUTPATIENT
Start: 2019-09-04 | End: 2020-09-08

## 2019-09-04 RX ORDER — ESTRADIOL 0.5 MG/1
TABLET ORAL
Qty: 12 TABLET | Refills: 3 | Status: SHIPPED | OUTPATIENT
Start: 2019-09-04 | End: 2020-09-08

## 2019-09-04 ASSESSMENT — MIFFLIN-ST. JEOR: SCORE: 1234.67

## 2019-09-04 NOTE — PROGRESS NOTES
"  Musculoskeletal problem/pain      Duration: years    Description  Location: bilateral knees    Intensity:  moderate    Accompanying signs and symptoms: none    History  Previous similar problem: YES  Previous evaluation:  x-ray    Precipitating or alleviating factors:  Trauma or overuse: YES  Aggravating factors include: sitting, standing and climbing stairs    Therapies tried and outcome: tylenol    She had injections on 4/24 in both knees.   Seemed to work well for several months.    Would like another injection today.     Hypertension Follow-up      Do you check your blood pressure regularly outside of the clinic? No     Are you following a low salt diet? Yes    Are your blood pressures ever more than 140 on the top number (systolic) OR more   than 90 on the bottom number (diastolic), for example 140/90?     Medication Followup of Estace    Taking Medication as prescribed: yes    Side Effects:  None    Medication Helping Symptoms:  yes   She has a pessary.  Uses estrogen once weekly-vaginal.  She has had a hysterectomy.      Patient Active Problem List   Diagnosis     Essential hypertension, benign     CARDIOVASCULAR SCREENING; LDL GOAL LESS THAN 130     History of elevated glucose     UTI (urinary tract infection)     HL (hearing loss)     Vaginal prolapse     Pessary maintenance     Advanced directives, counseling/discussion     Screening for osteoporosis     Axillary adenopathy     Family history of colon cancer; sister, dx 2014, pt with no screening yet     Parent refuses immunizations      OBJECTIVE:Blood pressure 136/84, pulse 72, temperature 97.8  F (36.6  C), temperature source Tympanic, resp. rate 18, height 1.607 m (5' 3.25\"), weight 76.7 kg (169 lb), not currently breastfeeding. BMI=Body mass index is 29.7 kg/m .  GENERAL APPEARANCE ADULT: Alert, no acute distress  MS: extremities normal, no peripheral edema  NO knee erythema or swelling.      ASSESSMENT:   (M17.0) Osteoarthritis of both knees, " unspecified osteoarthritis type  (primary encounter diagnosis)  Comment:   Plan: lidocaine 1 % 2 mL, triamcinolone (KENALOG-40)         injection 40 mg, lidocaine 1 % 2 mL,         triamcinolone (KENALOG-40) injection 40 mg,         DRAIN/INJECT LARGE JOINT/BURSA        Possible risks of joint injection include; damage to tendons and ligaments, skin atrophy (thinning of skin and lighter skin color) and infection of the joint.     The benefit of injecting the joint is to relieve pain.   Procedure:  The joint was prepped with Betadyne and injected with 1 cc Kenalog (40 mg) and 2 cc 1% xylocaine.  I used sterile technique and anteromedial approach. No complications.    Bandaid dressing was applied.      Sometimes the injected joint feels worse in the first 24 hours, but if the injection is successful, the pain should be better within the next 3 days or so.  Go easy on the joint for the next several days after injection.    If injection is helpful, it could be repeated 3-4 times a year (at least 3 months apart).   Let me know if injection not helping, we can consider other options for treatment.       (N81.10) Vaginal prolapse  Comment:   Plan: estradiol (ESTRACE) 0.5 MG tablet        REfills.     (Z86.39) History of elevated glucose  Comment: at risk for diabetes mellitus.   Plan: Hemoglobin A1c        Non-fasting blood tests today.     (I10) Essential hypertension with goal blood pressure less than 140/90  Comment: doing well  Plan: lisinopril-hydrochlorothiazide         (PRINZIDE/ZESTORETIC) 10-12.5 MG tablet, Basic         metabolic panel        No change in current treatment plan.  REfills.

## 2019-09-04 NOTE — PATIENT INSTRUCTIONS
ASSESSMENT:   (M17.0) Osteoarthritis of both knees, unspecified osteoarthritis type  (primary encounter diagnosis)  Comment:   Plan: lidocaine 1 % 2 mL, triamcinolone (KENALOG-40)         injection 40 mg, lidocaine 1 % 2 mL,         triamcinolone (KENALOG-40) injection 40 mg,         DRAIN/INJECT LARGE JOINT/BURSA        Possible risks of joint injection include; damage to tendons and ligaments, skin atrophy (thinning of skin and lighter skin color) and infection of the joint.     The benefit of injecting the joint is to relieve pain.   Procedure:  The joint was prepped with Betadyne and injected with 1 cc Kenalog (40 mg) and 2 cc 1% xylocaine.  I used sterile technique and anteromedial approach. No complications.    Bandaid dressing was applied.      Sometimes the injected joint feels worse in the first 24 hours, but if the injection is successful, the pain should be better within the next 3 days or so.  Go easy on the joint for the next several days after injection.    If injection is helpful, it could be repeated 3-4 times a year (at least 3 months apart).   Let me know if injection not helping, we can consider other options for treatment.       (N81.10) Vaginal prolapse  Comment:   Plan: estradiol (ESTRACE) 0.5 MG tablet        REfills.     (Z86.39) History of elevated glucose  Comment: at risk for diabetes mellitus.   Plan: Hemoglobin A1c        Non-fasting blood tests today.     (I10) Essential hypertension with goal blood pressure less than 140/90  Comment: doing well  Plan: lisinopril-hydrochlorothiazide         (PRINZIDE/ZESTORETIC) 10-12.5 MG tablet, Basic         metabolic panel        No change in current treatment plan.  REfills.

## 2019-09-04 NOTE — NURSING NOTE
"Chief Complaint   Patient presents with     Knee Pain       Initial /84   Pulse 72   Temp 97.8  F (36.6  C) (Tympanic)   Resp 18   Ht 1.607 m (5' 3.25\")   Wt 76.7 kg (169 lb)   BMI 29.70 kg/m   Estimated body mass index is 29.7 kg/m  as calculated from the following:    Height as of this encounter: 1.607 m (5' 3.25\").    Weight as of this encounter: 76.7 kg (169 lb).    Patient presents to the clinic using [facility] that is potentially due pending provider review:          Is there anyone who you would like to be able to receive your results?   If yes have patient fill out KATALINA    "

## 2019-09-04 NOTE — LETTER
September 5, 2019      Mary Art  [address]  [facility] [address]        Dear ,    We are writing to inform you of your test results.    The blood chemistries (Basic metabolic panel) are all normal including electrolytes (salt balances in the blood) and kidney tests with the exception of glucose which is high in pre-diabetes range.   HgbA1C is in pre-diabetes range similar to past.     PLAN: She is at high risk for becoming diabetic.   I suggest Weight loss, regular exercise with goal of 30 minutes most days of the week and eating a prudent diet (lots of fruits and vegetables, limiting unhealthy fats and excessive calories).  Consider dietician consult to help with diabetic diet.  Enclosed is the referral    Resulted Orders   Basic metabolic panel   Result Value Ref Range    Sodium 136 133 - 144 mmol/L    Potassium 4.1 3.4 - 5.3 mmol/L    Chloride 103 94 - 109 mmol/L    Carbon Dioxide 29 20 - 32 mmol/L    Anion Gap 4 3 - 14 mmol/L    Glucose 109 (H) 70 - 99 mg/dL    Urea Nitrogen 21 7 - 30 mg/dL    Creatinine 0.58 0.52 - 1.04 mg/dL    GFR Estimate 90 >60 mL/min/[1.73_m2]      Comment:      Non  GFR Calc  Starting 12/18/2018, serum creatinine based estimated GFR (eGFR) will be   calculated using the Chronic Kidney Disease Epidemiology Collaboration   (CKD-EPI) equation.      GFR Estimate If Black >90 >60 mL/min/[1.73_m2]      Comment:       GFR Calc  Starting 12/18/2018, serum creatinine based estimated GFR (eGFR) will be   calculated using the Chronic Kidney Disease Epidemiology Collaboration   (CKD-EPI) equation.      Calcium 9.6 8.5 - 10.1 mg/dL   Hemoglobin A1c   Result Value Ref Range    Hemoglobin A1C 6.2 (H) 0 - 5.6 %      Comment:      Normal <5.7% Prediabetes 5.7-6.4%  Diabetes 6.5% or higher - adopted from ADA   consensus guidelines.         If you have any questions or concerns, please call the clinic at the number listed above.        Sincerely,        Ricardo Camacho MD/dw

## 2019-09-05 DIAGNOSIS — R73.03 PRE-DIABETES: Primary | ICD-10-CM

## 2019-09-05 RX ADMIN — TRIAMCINOLONE ACETONIDE 40 MG: 40 INJECTION, SUSPENSION INTRA-ARTICULAR; INTRAMUSCULAR at 08:36

## 2019-09-05 RX ADMIN — TRIAMCINOLONE ACETONIDE 40 MG: 40 INJECTION, SUSPENSION INTRA-ARTICULAR; INTRAMUSCULAR at 08:35

## 2019-09-05 NOTE — RESULT ENCOUNTER NOTE
Please call.  The blood chemistries (Basic metabolic panel) are all normal including electrolytes (salt balances in the blood) and kidney tests with the exception of glucose which is high in pre-diabetes range.   HgbA1C is in pre-diabetes range similar to past.     PLAN: She is at high risk for becoming diabetic.   I suggest Weight loss, regular exercise with goal of 30 minutes most days of the week and eating a prudent diet (lots of fruits and vegetables, limiting unhealthy fats and excessive calories).  Consider dietician consult to help with diabetic diet.

## 2019-09-25 ENCOUNTER — TELEPHONE (OUTPATIENT)
Dept: FAMILY MEDICINE | Facility: CLINIC | Age: 74
End: 2019-09-25

## 2019-09-25 NOTE — TELEPHONE ENCOUNTER
Patient had her knees injected on 9/4/19 by Dr. Camacho. This time it has not helped. She is calling as she was told to call and he would try something else. She is looking for other relief/ideas. Please advise.    Leonie Persaud-Station

## 2019-09-25 NOTE — TELEPHONE ENCOUNTER
Please call.  If the knee injections did not help, I suggest a visit with orthopedic surgeon to consider surgery.    If not interested in any surgery, could see sports med for consideration of other types of injection.   CHRIS SHERIDAN MD

## 2019-09-30 ENCOUNTER — OFFICE VISIT (OUTPATIENT)
Dept: FAMILY MEDICINE | Facility: CLINIC | Age: 74
End: 2019-09-30
Payer: MEDICARE

## 2019-09-30 ENCOUNTER — TELEPHONE (OUTPATIENT)
Dept: FAMILY MEDICINE | Facility: CLINIC | Age: 74
End: 2019-09-30

## 2019-09-30 ENCOUNTER — OFFICE VISIT (OUTPATIENT)
Dept: OBGYN | Facility: CLINIC | Age: 74
End: 2019-09-30
Payer: MEDICARE

## 2019-09-30 VITALS
HEART RATE: 80 BPM | HEIGHT: 63 IN | BODY MASS INDEX: 29.95 KG/M2 | RESPIRATION RATE: 18 BRPM | SYSTOLIC BLOOD PRESSURE: 131 MMHG | DIASTOLIC BLOOD PRESSURE: 71 MMHG | WEIGHT: 169 LBS | TEMPERATURE: 97.9 F

## 2019-09-30 DIAGNOSIS — M17.0 PRIMARY OSTEOARTHRITIS OF BOTH KNEES: Primary | ICD-10-CM

## 2019-09-30 DIAGNOSIS — Z46.89 PESSARY MAINTENANCE: ICD-10-CM

## 2019-09-30 PROCEDURE — 99207 ZZC NO CHARGE NURSE ONLY: CPT

## 2019-09-30 PROCEDURE — 99213 OFFICE O/P EST LOW 20 MIN: CPT | Performed by: OBSTETRICS & GYNECOLOGY

## 2019-09-30 ASSESSMENT — MIFFLIN-ST. JEOR: SCORE: 1234.67

## 2019-09-30 NOTE — NURSING NOTE
"Initial /71 (BP Location: Right arm, Patient Position: Chair, Cuff Size: Adult Large)   Pulse 80   Temp 97.9  F (36.6  C) (Tympanic)   Resp 18   Ht 1.607 m (5' 3.25\")   Wt 76.7 kg (169 lb)   BMI 29.70 kg/m   Estimated body mass index is 29.7 kg/m  as calculated from the following:    Height as of this encounter: 1.607 m (5' 3.25\").    Weight as of this encounter: 76.7 kg (169 lb). .      "

## 2019-09-30 NOTE — PROGRESS NOTES
"S-(situation): Pt here for pessary with OB/Gyn, and has questions about her knees.    B-(background): 9/4/19 appt with Dr. Camacho and had Kenalog injection of both knees done.     A-(assessment):   Had injections 4/24/19 and they helped, had injections done again 9/4/19, helped for \"maybe about a week or so, not too long.\"  She likes to bowl and she's afraid she might fall, and the steps are \"killers, otherwise it's not too bad walking.\"  She takes Tylenol Arthritis, 650mg, one tab po every 8 hours, she \"thinks so\" that this helps. She still takes a baby ASA every day.   Pt says when she saw Dr. Camacho he said: \"If they don't work then we have to try something else.\" She also thinks she got a call from the X-ray dept on Friday, and was wondering what is she supposed to have X-rayed?     R-(recommendations): She is questioning Brett Byrne, as this helped her brother in law. Or does she need X-rays? Wanting to know what her next step/options are.     Advise,     Mili GUNTER RN        "

## 2019-09-30 NOTE — TELEPHONE ENCOUNTER
Please call.  If the cortisone injections are not helpful, the options would be to see an orthopedic surgeon or to see 1 of the non-surgery orthopedic specialists to consider hyaluronic acid, the rooster comb injection.  This is been modestly effective at best for knee osteoarthritis.   It is not a cure all but if she wanted to try that we could do a referral to either specialist.      Other pain medications include:  Ibuprofen 200mg OTC may be taken up to 4 pills 4 times a day to the maximum of 3200mg or 16 pills daily as needed for pain.   Take with food.  Don't take with aspirin, Aleve or other antiinflammatory medication or with warfarin.     Or;  Naproxen (Aleve) OTC may be taken up to 2 pills 2 times a day to the maximum of 4 pills daily as needed for pain.   Take with food.  Don't take with aspirin, ibuprofen or other antiinflammatory medication or with warfarin.    CHRIS SHERIDAN MD

## 2019-09-30 NOTE — TELEPHONE ENCOUNTER
Spoke with pt and she wants to see one of the specialists to discuss the rooster comb.   Referral cued.    Mili GUNTER RN

## 2019-10-01 NOTE — TELEPHONE ENCOUNTER
This has been addressed in another encounter - see Telephone Encounter from 9/30/19.    Mili GUNTER RN

## 2019-10-08 ENCOUNTER — ANCILLARY PROCEDURE (OUTPATIENT)
Dept: GENERAL RADIOLOGY | Facility: CLINIC | Age: 74
End: 2019-10-08
Attending: PEDIATRICS
Payer: MEDICARE

## 2019-10-08 ENCOUNTER — OFFICE VISIT (OUTPATIENT)
Dept: ORTHOPEDICS | Facility: CLINIC | Age: 74
End: 2019-10-08
Payer: MEDICARE

## 2019-10-08 VITALS
BODY MASS INDEX: 29.95 KG/M2 | DIASTOLIC BLOOD PRESSURE: 72 MMHG | WEIGHT: 169 LBS | SYSTOLIC BLOOD PRESSURE: 130 MMHG | HEIGHT: 63 IN

## 2019-10-08 DIAGNOSIS — M17.0 ARTHRITIS OF BOTH KNEES: Primary | ICD-10-CM

## 2019-10-08 DIAGNOSIS — M25.562 BILATERAL CHRONIC KNEE PAIN: ICD-10-CM

## 2019-10-08 DIAGNOSIS — G89.29 BILATERAL CHRONIC KNEE PAIN: ICD-10-CM

## 2019-10-08 DIAGNOSIS — M25.561 BILATERAL CHRONIC KNEE PAIN: ICD-10-CM

## 2019-10-08 PROCEDURE — 99203 OFFICE O/P NEW LOW 30 MIN: CPT | Performed by: PEDIATRICS

## 2019-10-08 PROCEDURE — 73562 X-RAY EXAM OF KNEE 3: CPT | Mod: RT

## 2019-10-08 ASSESSMENT — MIFFLIN-ST. JEOR: SCORE: 1234.67

## 2019-10-08 NOTE — PROGRESS NOTES
Sports Medicine Clinic Visit    PCP: Heather Chen Surekha Art is a 73 year old female who is seen  in consultation at the request of  Ricardo Camacho M.D. presenting with bilateral knee pain    Injury: She reports bilateral chronic knee pain. She reports no injury. She states her pain increases with walking and stairs. She had a steroid injection with Dr Camacho in April 2019 and September with lessening relief of her pain.    Location of Pain: bilateral knee pain  Duration of Pain: 10 year(s)  Rating of Pain at worst: 8/10  Rating of Pain Currently: 8/10  Symptoms are better with: rest  Symptoms are worse with: stairs and walking  Additional Features:   Positive: swelling, popping, grinding and weakness   Negative: bruising, catching, locking, instability, paresthesias and numbness  Other evaluation and/or treatments so far consists of: steroid injections  Prior History of related problems: nothing    Social History: Retired, Helps in a     Review of Systems  Skin: no bruising, no swelling  Musculoskeletal: as above  Neurologic: no numbness, paresthesias  Remainder of review of systems is negative including constitutional, CV, pulmonary, GI, except as noted in HPI or medical history.    Patient's current problem list, past medical and surgical history, and family history were reviewed.    Patient Active Problem List   Diagnosis     Essential hypertension with goal blood pressure less than 140/90     CARDIOVASCULAR SCREENING; LDL GOAL LESS THAN 130     History of elevated glucose     HL (hearing loss)     Vaginal prolapse     Pessary maintenance     Advanced directives, counseling/discussion     Axillary adenopathy     Family history of colon cancer; sister, dx 2014, pt with no screening yet     Parent refuses immunizations     Past Medical History:   Diagnosis Date     HTN      Past Surgical History:   Procedure Laterality Date     HYSTERECTOMY TOTAL ABDOMINAL  1991    Total abdominal hysterectomy &  "bilateral salpingo-oophorectomy     KNEE SURGERY  2003    Knee (L)     Family History   Problem Relation Age of Onset     Hypertension Mother      Hypertension Father      Diabetes Father      Hypertension Sister      Hypertension Brother      C.A.D. No family hx of          Objective  /72   Ht 1.607 m (5' 3.25\")   Wt 76.7 kg (169 lb)   BMI 29.70 kg/m      GENERAL APPEARANCE: healthy, alert and no distress   GAIT: antalgic and cane  SKIN: no suspicious lesions or rashes  HEENT: Sclera clear, anicteric  CV: good peripheral pulses  RESP: Breathing not labored  NEURO: Normal strength and tone, mentation intact and speech normal  PSYCH:  mentation appears normal and affect normal/bright    Bilateral Knee exam    Inspection:      mild swelling bilateral    Patella:      Crepitus noted in the patellofemoral joint bilateral    Tender:      medial joint line bilateral       lateral joint line bilateral    Non Tender:      remainder of knee area bilateral    Knee ROM:      Flexion 90 degrees bilateral       Extension 10 degrees bilateral    Strength:      5-/5 with knee extension bilateral    Special Tests:     Stable to varus and valgus stress    Gait:      normal    Neurovascular:      2+ peripheral pulses bilaterally and brisk capillary refill       sensation grossly intact    Radiology  I ordered, visualized and reviewed these images with the patient  Xr Knee Bilateral 3 Vw  Result Date: 10/8/2019  BILATERAL KNEES THREE VIEWS October 8, 2019 8:14 AM HISTORY: Chronic bilateral knee pain. COMPARISON: Radiographs on 11/4/2015. FINDINGS: No fracture or osseous lesion is seen. Again seen is complete medial compartment joint space loss of both knees with slightly less extensive degenerative change in the patellofemoral articulations. This may have slightly progressed since the prior study. The lateral compartment spaces are well preserved. No soft tissue pathology is demonstrated.   IMPRESSION: Advanced degenerative " changes as described above.     Assessment:  1. Arthritis of both knees      Discussed nature of degenerative arthrosis of the knee. Discussed symptom treatment with over-the-counter medications, ice or heat, topical treatments, and rest if needed. Discussed use of sleeve or wrap for comfort. Discussed benefits of exercise and weight loss to reduce pressure at the knee. Discussed injection therapy. Also briefly discussed future consideration of referral to orthopedic surgery for further evaluation and discussion of arthroplasty.  -At this point patient has failed conservative treatment including cortisone injections and over-the-counter medications.  She would like to trial hyaluronic acid injections.  -We discussed potential referral to surgery as well, especially given lack of range of motion which is unlikely to be improved with injections.  Patient will consider referral to surgery in the future, would like to proceed with additional injections first.    Plan:  - Today's Plan of Care:  Referral for possible synvisc injection  Schedule injection 2 weeks out    -We also discussed other future treatment options:  Referral to Orthopedic Surgery, Physical Therapy, Trial of additional corticosteroid injection    Follow Up: as needed    Concerning signs and symptoms were reviewed.  The patient expressed understanding of this management plan and all questions were answered at this time.    Thanks for the opportunity to participate in the care of this patient, I will keep you updated on their progress.    CC: Ricardo Coburn MD [facility]  Primary Care Sports Medicine  [facility]

## 2019-10-08 NOTE — LETTER
10/8/2019         RE: Mary Art  [address]  [facility] [address]        Dear Colleague,    Thank you for referring your patient, Mary Art, to the [facility] [place]. Please see a copy of my visit note below.    Sports Medicine Clinic Visit    PCP: Heather Chen    Mary Art is a 73 year old female who is seen  in consultation at the request of  Ricardo Camacho M.D. presenting with bilateral knee pain    Injury: She reports bilateral chronic knee pain. She reports no injury. She states her pain increases with walking and stairs. She had a steroid injection with Dr Camacho in April 2019 and September with lessening relief of her pain.    Location of Pain: bilateral knee pain  Duration of Pain: 10 year(s)  Rating of Pain at worst: 8/10  Rating of Pain Currently: 8/10  Symptoms are better with: rest  Symptoms are worse with: stairs and walking  Additional Features:   Positive: swelling, popping, grinding and weakness   Negative: bruising, catching, locking, instability, paresthesias and numbness  Other evaluation and/or treatments so far consists of: steroid injections  Prior History of related problems: nothing    Social History: Retired, Helps in a     Review of Systems  Skin: no bruising, no swelling  Musculoskeletal: as above  Neurologic: no numbness, paresthesias  Remainder of review of systems is negative including constitutional, CV, pulmonary, GI, except as noted in HPI or medical history.    Patient's current problem list, past medical and surgical history, and family history were reviewed.    Patient Active Problem List   Diagnosis     Essential hypertension with goal blood pressure less than 140/90     CARDIOVASCULAR SCREENING; LDL GOAL LESS THAN 130     History of elevated glucose     HL (hearing loss)     Vaginal prolapse     Pessary maintenance     Advanced directives, counseling/discussion     Axillary adenopathy     Family history of colon  "cancer; sister, dx 2014, pt with no screening yet     Parent refuses immunizations     Past Medical History:   Diagnosis Date     HTN      Past Surgical History:   Procedure Laterality Date     HYSTERECTOMY TOTAL ABDOMINAL  1991    Total abdominal hysterectomy & bilateral salpingo-oophorectomy     KNEE SURGERY  2003    Knee (L)     Family History   Problem Relation Age of Onset     Hypertension Mother      Hypertension Father      Diabetes Father      Hypertension Sister      Hypertension Brother      C.A.D. No family hx of          Objective  /72   Ht 1.607 m (5' 3.25\")   Wt 76.7 kg (169 lb)   BMI 29.70 kg/m       GENERAL APPEARANCE: healthy, alert and no distress   GAIT: antalgic and cane  SKIN: no suspicious lesions or rashes  HEENT: Sclera clear, anicteric  CV: good peripheral pulses  RESP: Breathing not labored  NEURO: Normal strength and tone, mentation intact and speech normal  PSYCH:  mentation appears normal and affect normal/bright    Bilateral Knee exam    Inspection:      mild swelling bilateral    Patella:      Crepitus noted in the patellofemoral joint bilateral    Tender:      medial joint line bilateral       lateral joint line bilateral    Non Tender:      remainder of knee area bilateral    Knee ROM:      Flexion 90 degrees bilateral       Extension 10 degrees bilateral    Strength:      5-/5 with knee extension bilateral    Special Tests:     Stable to varus and valgus stress    Gait:      normal    Neurovascular:      2+ peripheral pulses bilaterally and brisk capillary refill       sensation grossly intact    Radiology  I ordered, visualized and reviewed these images with the patient  Xr Knee Bilateral 3 Vw  Result Date: 10/8/2019  BILATERAL KNEES THREE VIEWS October 8, 2019 8:14 AM HISTORY: Chronic bilateral knee pain. COMPARISON: Radiographs on 11/4/2015. FINDINGS: No fracture or osseous lesion is seen. Again seen is complete medial compartment joint space loss of both knees with " slightly less extensive degenerative change in the patellofemoral articulations. This may have slightly progressed since the prior study. The lateral compartment spaces are well preserved. No soft tissue pathology is demonstrated.   IMPRESSION: Advanced degenerative changes as described above.     Assessment:  1. Arthritis of both knees      Discussed nature of degenerative arthrosis of the knee. Discussed symptom treatment with over-the-counter medications, ice or heat, topical treatments, and rest if needed. Discussed use of sleeve or wrap for comfort. Discussed benefits of exercise and weight loss to reduce pressure at the knee. Discussed injection therapy. Also briefly discussed future consideration of referral to orthopedic surgery for further evaluation and discussion of arthroplasty.  -At this point patient has failed conservative treatment including cortisone injections and over-the-counter medications.  She would like to trial hyaluronic acid injections.  -We discussed potential referral to surgery as well, especially given lack of range of motion which is unlikely to be improved with injections.  Patient will consider referral to surgery in the future, would like to proceed with additional injections first.    Plan:  - Today's Plan of Care:  Referral for possible synvisc injection  Schedule injection 2 weeks out    -We also discussed other future treatment options:  Referral to Orthopedic Surgery, Physical Therapy, Trial of additional corticosteroid injection    Follow Up: as needed    Concerning signs and symptoms were reviewed.  The patient expressed understanding of this management plan and all questions were answered at this time.    Thanks for the opportunity to participate in the care of this patient, I will keep you updated on their progress.    CC: Ricardo Coburn MD [facility]  Primary Care Sports Medicine  [facility]    Again, thank you for allowing me  to participate in the care of your patient.        Sincerely,        Elaine Coburn MD

## 2019-10-21 ENCOUNTER — OFFICE VISIT (OUTPATIENT)
Dept: ORTHOPEDICS | Facility: CLINIC | Age: 74
End: 2019-10-21
Payer: MEDICARE

## 2019-10-21 DIAGNOSIS — M17.12 ARTHRITIS OF LEFT KNEE: Primary | ICD-10-CM

## 2019-10-21 PROCEDURE — 20611 DRAIN/INJ JOINT/BURSA W/US: CPT | Mod: LT | Performed by: FAMILY MEDICINE

## 2019-10-21 NOTE — LETTER
10/21/2019         RE: Mary Art  [address]  [facility] [address]        Dear Colleague,    Thank you for referring your patient, Mary Art, to the [facility]. Please see a copy of my visit note below.    Large Joint Injection/Arthocentesis: L knee joint  Date/Time: 10/21/2019 9:00 AM  Performed by: Daniel Feliciano MD  Authorized by: Daniel Feliciano MD     Indications:  Pain and osteoarthritis  Needle Size:  18 G  Guidance: ultrasound    Approach:  Superolateral  Location:  Knee      Medications:  48 mg hylan 48 MG/6ML  Aspirate amount (mL):  16  Aspirate:  Serous and yellow  Outcome:  Tolerated well, no immediate complications  Procedure discussed: discussed risks, benefits, and alternatives    Consent Given by:  Patient  Timeout: timeout called immediately prior to procedure    Prep: patient was prepped and draped in usual sterile fashion     Patient tolerated Synvisc One injections.  Aftercare instructions given to patient.  Plan to follow-up as previously discussed with referring provider.     Daniel Feliciano MD [facility]            Again, thank you for allowing me to participate in the care of your patient.        Sincerely,        Daniel Feliciano MD

## 2019-10-21 NOTE — PROGRESS NOTES
Large Joint Injection/Arthocentesis: L knee joint  Date/Time: 10/21/2019 9:00 AM  Performed by: Daniel Feliciano MD  Authorized by: Daniel Feliciano MD     Indications:  Pain and osteoarthritis  Needle Size:  18 G  Guidance: ultrasound    Approach:  Superolateral  Location:  Knee      Medications:  48 mg hylan 48 MG/6ML  Aspirate amount (mL):  16  Aspirate:  Serous and yellow  Outcome:  Tolerated well, no immediate complications  Procedure discussed: discussed risks, benefits, and alternatives    Consent Given by:  Patient  Timeout: timeout called immediately prior to procedure    Prep: patient was prepped and draped in usual sterile fashion     Patient tolerated Synvisc One injections.  Aftercare instructions given to patient.  Plan to follow-up as previously discussed with referring provider.     Daniel Feliciano MD [facility]

## 2019-10-21 NOTE — PATIENT INSTRUCTIONS
[facility] Injection Discharge Instructions      You may shower, however avoid swimming, tub baths or hot tubs for 24 hours following your procedure    You may have a mild to moderate increase in pain for several days following the injection.    It may take up to 14 days for the steroid medication to start working although you may feel the effect as early as a few days after the procedure.    You may use ice packs for 10-15 minutes, 3 to 4 times a day at the injection site for comfort    You may use anti-inflammatory medications (such as Ibuprofen or Aleve or Advil) or Tylenol for pain control if necessary    If you were fasting, you may resume your normal diet and medications after the procedure    If you experience any of the following, call [facility] @ [phone] or [phone]  -Fever over 100 degree F  -Swelling, bleeding, redness, drainage, warmth at the injection site  - New or worsening pain

## 2019-11-04 ENCOUNTER — OFFICE VISIT (OUTPATIENT)
Dept: ORTHOPEDICS | Facility: CLINIC | Age: 74
End: 2019-11-04
Payer: MEDICARE

## 2019-11-04 DIAGNOSIS — M17.0 ARTHRITIS OF BOTH KNEES: Primary | ICD-10-CM

## 2019-11-04 PROCEDURE — 20611 DRAIN/INJ JOINT/BURSA W/US: CPT | Mod: RT | Performed by: FAMILY MEDICINE

## 2019-11-04 NOTE — LETTER
11/4/2019         RE: Mary Art  [address]  [facility] [address]        Dear Colleague,    Thank you for referring your patient, Mary Art, to the [facility] [place]. Please see a copy of my visit note below.    Large Joint Injection/Arthocentesis: R knee joint  Date/Time: 11/4/2019 8:05 AM  Performed by: Daniel Feliciano MD  Authorized by: Daniel Feliciano MD     Indications:  Pain and osteoarthritis  Needle Size:  21 G  Guidance: ultrasound    Approach:  Superolateral  Location:  Knee      Medications:  48 mg hylan 48 MG/6ML  Outcome:  Tolerated well, no immediate complications  Procedure discussed: discussed risks, benefits, and alternatives    Consent Given by:  Patient  Timeout: timeout called immediately prior to procedure    Prep: patient was prepped and draped in usual sterile fashion     Patient tolerated right knee Synvisc injection.  Aftercare instructions given to patient.  Plan to follow-up as needed for right knee arthritis.    Daniel Feliciano MD [facility]            Again, thank you for allowing me to participate in the care of your patient.        Sincerely,        Daniel Feliciano MD

## 2019-11-04 NOTE — PROGRESS NOTES
Large Joint Injection/Arthocentesis: R knee joint  Date/Time: 11/4/2019 8:05 AM  Performed by: Daniel Feliciano MD  Authorized by: Daniel Feliciano MD     Indications:  Pain and osteoarthritis  Needle Size:  21 G  Guidance: ultrasound    Approach:  Superolateral  Location:  Knee      Medications:  48 mg hylan 48 MG/6ML  Outcome:  Tolerated well, no immediate complications  Procedure discussed: discussed risks, benefits, and alternatives    Consent Given by:  Patient  Timeout: timeout called immediately prior to procedure    Prep: patient was prepped and draped in usual sterile fashion     Patient tolerated right knee Synvisc injection.  Aftercare instructions given to patient.  Plan to follow-up as needed for right knee arthritis.    Daniel Feliciano MD [facility]

## 2019-11-04 NOTE — PATIENT INSTRUCTIONS
Diagnosis: Bilateral knee arthritis   Image Findings: bilateral knee arthritis  Treatment: right knee Synvisc injection  Medications: Limited Tylenol  Follow-up: 6 mon and 1 day from today for repeat injections if your pain returns    It was great seeing you again today!    Daniel Feliciano            [facility] Injection Discharge Instructions      You may shower, however avoid swimming, tub baths or hot tubs for 24 hours following your procedure    You may have a mild to moderate increase in pain for several days following the injection.    It may take up to 14 days for the steroid medication to start working although you may feel the effect as early as a few days after the procedure.    You may use ice packs for 10-15 minutes, 3 to 4 times a day at the injection site for comfort    You may use anti-inflammatory medications (such as Ibuprofen or Aleve or Advil) or Tylenol for pain control if necessary    If you were fasting, you may resume your normal diet and medications after the procedure    If you have diabetes, check your blood sugar more frequently than usual as your blood sugar may be higher than normal for 10-14 days following a steroid injection. Contact your doctor who manages your diabetes if your blood sugar is higher than usual      If you experience any of the following, call [facility] @ [phone] or [phone]  -Fever over 100 degree F  -Swelling, bleeding, redness, drainage, warmth at the injection site  - New or worsening pain

## 2019-12-31 ENCOUNTER — TELEPHONE (OUTPATIENT)
Dept: ORTHOPEDICS | Facility: CLINIC | Age: 74
End: 2019-12-31

## 2019-12-31 DIAGNOSIS — M17.0 ARTHRITIS OF BOTH KNEES: Primary | ICD-10-CM

## 2019-12-31 NOTE — TELEPHONE ENCOUNTER
Reason for Call:  Other call back    Detailed comments: pt calling stating she had cortisone and rooster knee injections. Neither of them have worked for her. Is there anything else she can try?    Phone Number Patient can be reached at: Home number on file [phone] (home)    Best Time: any     Can we leave a detailed message on this number? YES    Call taken on 12/31/2019 at 9:22 AM by Shirin Oviedo

## 2019-12-31 NOTE — TELEPHONE ENCOUNTER
Called and spoke with patient.  She states that she did not have any relief from either injection.  I explained that in Dr. Coburn's last OV, a referral to orthopedic surgery was discussed.  I offered a referral or a follow up appointment with Dr. Coburn.  Patient elected to have a consult with orthopedic surgery.  Ortho  number was provided.      Talisha Condon, ATC

## 2020-01-13 ENCOUNTER — TRANSFERRED RECORDS (OUTPATIENT)
Dept: HEALTH INFORMATION MANAGEMENT | Facility: CLINIC | Age: 75
End: 2020-01-13

## 2020-05-15 ENCOUNTER — TRANSFERRED RECORDS (OUTPATIENT)
Dept: HEALTH INFORMATION MANAGEMENT | Facility: CLINIC | Age: 75
End: 2020-05-15

## 2020-05-19 ENCOUNTER — MEDICAL CORRESPONDENCE (OUTPATIENT)
Dept: HEALTH INFORMATION MANAGEMENT | Facility: CLINIC | Age: 75
End: 2020-05-19

## 2020-06-19 ENCOUNTER — ALLIED HEALTH/NURSE VISIT (OUTPATIENT)
Dept: FAMILY MEDICINE | Facility: CLINIC | Age: 75
End: 2020-06-19
Payer: MEDICARE

## 2020-06-19 VITALS — DIASTOLIC BLOOD PRESSURE: 80 MMHG | SYSTOLIC BLOOD PRESSURE: 122 MMHG | HEART RATE: 83 BPM

## 2020-06-19 DIAGNOSIS — I10 ESSENTIAL HYPERTENSION WITH GOAL BLOOD PRESSURE LESS THAN 140/90: Primary | ICD-10-CM

## 2020-06-19 PROCEDURE — 99207 ZZC NO CHARGE NURSE ONLY: CPT

## 2020-06-19 NOTE — PROGRESS NOTES
Blood pressure taken against home unit   Home unit was 141/95 wrist and pulse 94  There is a large difference then ours in clinic and patient will return new meter and stop in for checks on occasion.  I called down to GYN and was able to help her get scheduled with Dr. Jeff. Aileen Ross MA

## 2020-07-02 ENCOUNTER — TELEPHONE (OUTPATIENT)
Dept: FAMILY MEDICINE | Facility: CLINIC | Age: 75
End: 2020-07-02

## 2020-07-02 NOTE — TELEPHONE ENCOUNTER
Reason for call:  Patient reporting a symptom    Symptom or request: Pt says she has pain in both knees and would like something for the pain. States she has seen ortho and has arthritis in her knees. She wants to know if Dr Waller can call something in or if she has to be seen.       Have you been treated for this before? Yes    Additional comments:  [facility] pharmacy    Phone Number patient can be reached at:  Home number on file [phone] (home)    Best Time:  anytime    Can we leave a detailed message on this number:  YES    Call taken on 7/2/2020 at 9:15 AM by Valentina Padilla

## 2020-07-06 ENCOUNTER — OFFICE VISIT (OUTPATIENT)
Dept: FAMILY MEDICINE | Facility: CLINIC | Age: 75
End: 2020-07-06
Payer: MEDICARE

## 2020-07-06 VITALS
WEIGHT: 169.8 LBS | OXYGEN SATURATION: 98 % | TEMPERATURE: 97.4 F | HEART RATE: 69 BPM | DIASTOLIC BLOOD PRESSURE: 82 MMHG | RESPIRATION RATE: 18 BRPM | HEIGHT: 63 IN | SYSTOLIC BLOOD PRESSURE: 130 MMHG | BODY MASS INDEX: 30.09 KG/M2

## 2020-07-06 DIAGNOSIS — M17.0 PRIMARY OSTEOARTHRITIS OF BOTH KNEES: Primary | ICD-10-CM

## 2020-07-06 PROCEDURE — 20610 DRAIN/INJ JOINT/BURSA W/O US: CPT | Mod: RT | Performed by: PHYSICIAN ASSISTANT

## 2020-07-06 RX ORDER — TRIAMCINOLONE ACETONIDE 40 MG/ML
40 INJECTION, SUSPENSION INTRA-ARTICULAR; INTRAMUSCULAR ONCE
Status: COMPLETED | OUTPATIENT
Start: 2020-07-06 | End: 2020-07-06

## 2020-07-06 RX ORDER — IBUPROFEN 600 MG/1
600 TABLET, FILM COATED ORAL EVERY 8 HOURS PRN
Qty: 90 TABLET | Refills: 2 | Status: SHIPPED | OUTPATIENT
Start: 2020-07-06 | End: 2023-08-04

## 2020-07-06 RX ADMIN — TRIAMCINOLONE ACETONIDE 40 MG: 40 INJECTION, SUSPENSION INTRA-ARTICULAR; INTRAMUSCULAR at 10:15

## 2020-07-06 ASSESSMENT — PAIN SCALES - GENERAL: PAINLEVEL: EXTREME PAIN (8)

## 2020-07-06 ASSESSMENT — MIFFLIN-ST. JEOR: SCORE: 1225.37

## 2020-07-06 NOTE — PROGRESS NOTES
Subjective     Mary Art is a 74 year old female who presents to clinic today for the following health issues:    HPI   Musculoskeletal problem/pain      Duration: Years     Description  Location: both knees-Says that she has had injections and been told that she needs surgery, Is afraid as her mom had surgery then got an infection and was in a wheel chair the rest of her life.    Intensity:  moderate, severe    Accompanying signs and symptoms: none    History  Previous similar problem: YES  Previous evaluation:  x-ray and MRI    Precipitating or alleviating factors:  Trauma or overuse: No injury. Just overuse from being outside.   Aggravating factors include: overuse    Therapies tried and outcome: Cortisone injections and braces     Patient Active Problem List   Diagnosis     Essential hypertension with goal blood pressure less than 140/90     CARDIOVASCULAR SCREENING; LDL GOAL LESS THAN 130     History of elevated glucose     HL (hearing loss)     Vaginal prolapse     Pessary maintenance     Advanced directives, counseling/discussion     Axillary adenopathy     Family history of colon cancer; sister, dx 2014, pt with no screening yet     Parent refuses immunizations     Past Surgical History:   Procedure Laterality Date     HYSTERECTOMY TOTAL ABDOMINAL  1991    Total abdominal hysterectomy & bilateral salpingo-oophorectomy     KNEE SURGERY  2003    Knee (L)       Social History     Tobacco Use     Smoking status: Never Smoker     Smokeless tobacco: Never Used   Substance Use Topics     Alcohol use: No     Family History   Problem Relation Age of Onset     Hypertension Mother      Hypertension Father      Diabetes Father      Hypertension Sister      Hypertension Brother      C.A.D. No family hx of          Current Outpatient Medications   Medication Sig Dispense Refill     aspirin 81 MG tablet Take 1 tablet by mouth daily.        CENTRUM SILVER OR TABS ONE DAILY 90 Tab 3     CRANBERRY        diclofenac  "(VOLTAREN) 1 % topical gel Place 4 g onto the skin 4 times daily 150 g 2     estradiol (ESTRACE) 0.5 MG tablet Place 1 tablet in the vagina once weekly. 12 tablet 3     ibuprofen (ADVIL/MOTRIN) 600 MG tablet Take 1 tablet (600 mg) by mouth every 8 hours as needed for moderate pain 90 tablet 2     lisinopril-hydrochlorothiazide (PRINZIDE/ZESTORETIC) 10-12.5 MG tablet Take 1 tablet by mouth daily 90 tablet 3     VITAMIN C 500 MG OR TABS ONE TABLET DAILY 3 MONTHS 3     VITAMIN D PO Take 1 tablet by mouth daily.       Allergies   Allergen Reactions     Keflex [Cephalexin Monohydrate] Rash     Penicillins Hives       Reviewed and updated as needed this visit by Provider  Tobacco  Allergies  Meds  Problems  Med Hx  Surg Hx  Fam Hx         Review of Systems   Constitutional, cardiovascular, pulmonary, msk, neuro, skin systems are negative, except as otherwise noted.      Objective    /82 (BP Location: Right arm, Patient Position: Sitting, Cuff Size: Adult Large)   Pulse 69   Temp 97.4  F (36.3  C) (Tympanic)   Resp 18   Ht 1.594 m (5' 2.75\")   Wt 77 kg (169 lb 12.8 oz)   SpO2 98%   BMI 30.32 kg/m    Body mass index is 30.32 kg/m .  Physical Exam   Constitutional: healthy, alert, and no distress  Head: Normocephalic. Atraumatic  Eyes: No conjunctival injection, sclera anicteric  Respiratory: No resp distress.  Musculoskeletal: There is a mild effusion of the R knee. Tenderness throughout the medial and lateral joint lines. No erythema. FROM of the R knee.   Neurologic: Gait normal. CN 2-12 grossly intact  Psychiatric: mentation appears normal and affect normal/bright    Procedure Note: Right Knee Injection  - Options for treatment, risks, benefits discussed. Potential complications of injection including tendon damage, skin atrophy (thinning of skin and lighter skin color) and infection.  - Informed consent obtained.   - Knee landmarks noted. The area for injection was prepped with alcohol swab. Ethyl " Chloride spray was used to anesthetize the area. 1 cc Kenalog and 4 cc 0.5% marcaine was injected into the knee joint. No complications.  Bandaid dressing applied.   - There may be some discomfort in the next 24 hours after injection. Benefit should be apparant in the next 3 days or so.    - Relative rest for the area is recommended for the next 3 days.    Diagnostic Test Results:  none       Assessment & Plan   (M17.0) Primary osteoarthritis of both knees  (primary encounter diagnosis)  Comment: Pt with hx of similar symptoms with OA flare. No injury or trauma to the R knee. Has only been using Tylenol. Discussed treatment options and patient made the informed decision for a R knee injection today. This was done in usual fashion as above. She tolerated the procedure well. Rx for Voltaren gel, and to start using a little Ibuprofen in addition to Tylenol for her pain when it is bad enough. RTC prn for any new, changing or worsening symptoms. Follow-up with me for L sided knee injection or she can go to her Orthopedist.   Plan: diclofenac (VOLTAREN) 1 % topical gel,         ibuprofen (ADVIL/MOTRIN) 600 MG tablet,         triamcinolone (KENALOG-40) injection 40 mg,         DRAIN/INJECT LARGE JOINT/BURSA    Return in about 4 weeks (around 8/3/2020), or if symptoms worsen or fail to improve, for In-Clinic Visit.    Maurilio Dias PA-C  [facility]

## 2020-07-06 NOTE — PATIENT INSTRUCTIONS
Patient Education     Arthritis: Exercise     Look for exercise classes for arthritis in your community.     Exercise is important to your overall health. It is especially important in people with arthritis. Regular exercise can:    Keep your heart and blood vessels healthy    Help with weight management, or weight loss    Improve your mood    Help prevent and manage health problems such as:  ? Diabetes  ? High blood pressure  ? High cholesterol  ? Depression  In people with arthritis, it offers all of those benefits and it can:    Lessen pain and stiffness    Strengthen muscles that support your joints    Help you to be able to do the things you enjoy  Exercise and arthritis  Exercise is an important part of any arthritis treatment plan. A complete program consists of the following 3 types of exercises:    Aerobic exercises for cardiovascular health and overall fitness.     Strengthening exercises to build up muscles to help prevent injury and keep joints stable.    Range-of-motion exercises to keep muscles and joints flexible.  Getting started  Talk with your healthcare provider about what is safe for you. Make sure you:    Learn how to do exercises correctly and safely. Consider talking with a physical therapist or  used to working with people with arthritis.    Start slowly and build. If you haven't been exercising, start slowly. Don't exercise too hard or too long.    Create a routine. Set aside specific times for exercise every day.    Warm up carefully. Take 5 to 10 minutes at the beginning and end of exercising to warm up and cool down. Just do the same exercises at a slower pace for 5 to 10 minutes.    Work at a comfortable, smooth pace. Move your joints gently to prevent injury.    Pay attention to your body. Don't exercise a painful or swollen joint; switch to another activity. Follow the 2-hour pain rule: You did too much if your joint or muscle pain lasts 2 hours or more after exercising, or is  worse the next day. This doesn't mean you should stop exercising. Just do less.  Aerobic exercise  Aerobic exercise improves overall health and helps control weight. Choose those that don't add extra stress to your joints. For example, walking, swimming, or bicycling.  Most people should exercise for at least 30 minutes, most days of the week. You don't have to exercise all at once. Try exercising for 10 minutes, 3 times a day, for example.  Strengthening exercises  Strengthening your muscles helps to protect your joints and prevent injuries. Try to do strengthening exercises 2 to 3 times a week:    These exercises can be done with exercise or resistance bands (inexpensive exercise aids that add resistance), or with light weights. Some people use soup cans as weights.    Isometric exercises are done by tightening the muscles without moving the joint. This may be a good way to strengthen the muscles around a stiff joint.  A physical therapist or  can teach you how to do these exercises.  Range-of-motion (ROM) exercises  Range-of-motion (ROM) exercises allow you to move each of your joints in every way they are intended to move. You should do ROM exercises for each joint 2 to 3 times a day. This will help you maintain full use of all of your joints.  Sample ROM exercises  The following are just a sample of ROM exercises--one for your neck, shoulders, elbows, hips, knees, and ankles. To completely move each joint through its full range of motion, you will have to do a few exercises for each joint. A physical therapist or  can teach you how to do full ROM exercises for each joint.   Repeat each for these exercises 5 to 10 times. Make sure you move slowly:  1. Neck turns. Sit in a straight-backed chair. Look straight ahead. Slowly turn your head to the right, then return it to center. Repeat. Do the same thing, turning your head to the left. Repeat.  2. Shoulder raise. Lie on your back or sit in a chair.  Raise one arm over your head, keeping your elbow straight. Keep the arm close to your ear. Return it slowly to your side. Repeat with your other arm.  3. Elbow stretch. Sit in a chair. If you are able, put both arms out to your sides to form a T. Slowly touch your shoulders with the tips of your fingers. Then return to the T-position. Repeat.  4. Hip stretch. Lie on your back with your legs straight and about 6 inches apart. With your foot flexed, slide your leg out to the side, then slide it back to the starting position. Repeat with your other leg.  5. Knee bend. Sit in a chair with your legs bent at the knees in front of you. Straighten one leg as much as you can, then bring it back to the floor. Repeat this 5 to 10 times. Then do the same thing with the other leg.   6. Ankle stretch. Sit with your feet flat on the floor. Lift your toes off of the floor while your heels stay down. Repeat. Then lift your heels off the floor while your toes stay down. Repeat.  Other exercise  Many other exercise and activities benefit people with arthritis. It is most important to find exercise and activities that you enjoy. You might try:    Yoga, including chair yoga, helps to keep your joints strong and flexible    Conrado Chi, an ancient type of exercise with slow, gentle movements    Water exercise, including water walking  For more information on exercise for arthritis go to the Arthritis Foundation website: www.arthritis.org.  Date Last Reviewed: 6/1/2018 2000-2019 [employer]. [address] [place] [address], [place] [id number]. All rights reserved. This information is not intended as a substitute for professional medical care. Always follow your healthcare professional's instructions.           Patient Education     Living with Osteoarthritis    Osteoarthritis is a chronic disease and the most common type of arthritis. But it doesn t have to keep you from leading an active life. You can help control symptoms by  exercising and losing weight if you are overweight. Using special tools also helps make life easier. Be sure to see your healthcare provider for scheduled checkups and lab work. If you have questions or concerns between office visits, call your healthcare provider's office.  Make exercise part of your life  Gentle exercise can help lessen your pain. Keep the following in mind:    Choose exercises that improve joint motion and make your muscles stronger. Your healthcare provider or a physical therapist may suggest a few.    Stretching and flexibility activities such as yoga and catrachito chi may improve pain and joint motion.    Try low-impact sports, such as walking, biking, or doing exercises in a warm pool.    Most people should exercise for at least 30 minutes a day on most days of the week. This can be broken up into shorter periods throughout the day.    Don t push yourself too hard at first. Slowly build up over time.    Make sure you warm up for 5 to 10 minutes before you exercise.    If pain and stiffness increase, don't exercise as hard or as long.  Watch your weight  If you weigh more than you should, your weight-bearing joints are under extra pressure. This makes your symptoms worse. To reduce pain and stiffness, try losing a few of those extra pounds. The tips below may help:    Start a weight-loss program with the help of your healthcare provider.    Ask your friends and family for support.    Join a weight-loss group.  Use special tools  Even simple tasks can be hard to do when your joints hurt. Special tools called assistive devices can make things easier by reducing strain and protecting your joints. Ask your healthcare provider where to find these and other helpful tools:    Long-handled reachers or grabbers    Jar openers and button threaders    Large  for pencils, garden tools, and other handheld objects  Use mobility and other aids  People with arthritis and other joint problems often use mobility  aids to help with walking. For example, they may use canes or walkers. They may also use splints or braces to support joints. Talk with your healthcare provider or physical therapist about these aids:    A cane to reduce knee or hip pain and help prevent falls    Splints for your wrists or other joints    A brace to support a weak knee joint    Orthotics for toe and foot involvement  Medical and surgical treatments  Discuss medical treatments with your healthcare provider to help reduce your pain and improve joint mobility. Treatments may include:    Topical medicines such as lidocaine, capsaicin, and diclofenac gel    Oral medicines such as acetaminophen, NSAIDs (nonsteroidal anti-inflammatory drugs) such as ibuprofen and naproxen, or opioids    Injections in affected joints such as corticosteroids in various joints, or hyaluronic acid in the knee joints    Surgical repair or surgical joint replacement with artificial joints    Complementary therapies such as heat and cold treatment, massage, acupuncture, supplements, cognitive training, meditation, and others. Discuss these options with your healthcare provider.  Date Last Reviewed: 6/1/2018 2000-2019 [employer]. [address] [place] [address], [place] [address]. All rights reserved. This information is not intended as a substitute for professional medical care. Always follow your healthcare professional's instructions.

## 2020-07-08 ENCOUNTER — OFFICE VISIT (OUTPATIENT)
Dept: FAMILY MEDICINE | Facility: CLINIC | Age: 75
End: 2020-07-08
Payer: MEDICARE

## 2020-07-08 VITALS
HEIGHT: 63 IN | DIASTOLIC BLOOD PRESSURE: 86 MMHG | HEART RATE: 64 BPM | TEMPERATURE: 97 F | BODY MASS INDEX: 29.95 KG/M2 | WEIGHT: 169 LBS | SYSTOLIC BLOOD PRESSURE: 142 MMHG | RESPIRATION RATE: 16 BRPM

## 2020-07-08 DIAGNOSIS — M17.0 PRIMARY OSTEOARTHRITIS OF BOTH KNEES: Primary | ICD-10-CM

## 2020-07-08 PROCEDURE — 20610 DRAIN/INJ JOINT/BURSA W/O US: CPT | Mod: LT | Performed by: PHYSICIAN ASSISTANT

## 2020-07-08 RX ORDER — TRIAMCINOLONE ACETONIDE 40 MG/ML
40 INJECTION, SUSPENSION INTRA-ARTICULAR; INTRAMUSCULAR ONCE
Status: COMPLETED | OUTPATIENT
Start: 2020-07-08 | End: 2020-07-08

## 2020-07-08 RX ORDER — BUPIVACAINE HYDROCHLORIDE 5 MG/ML
4 INJECTION, SOLUTION PERINEURAL ONCE
Status: COMPLETED | OUTPATIENT
Start: 2020-07-08 | End: 2020-07-08

## 2020-07-08 RX ADMIN — BUPIVACAINE HYDROCHLORIDE 20 MG: 5 INJECTION, SOLUTION PERINEURAL at 09:15

## 2020-07-08 RX ADMIN — TRIAMCINOLONE ACETONIDE 40 MG: 40 INJECTION, SUSPENSION INTRA-ARTICULAR; INTRAMUSCULAR at 09:15

## 2020-07-08 ASSESSMENT — MIFFLIN-ST. JEOR: SCORE: 1221.74

## 2020-07-08 NOTE — NURSING NOTE
Clinic Administered Medication Documentation      Injection Documentation     Injection was administered by provider (please see MAR for given by information). Please see MAR and medication order for additional information.     Site: Joint injection   Medication Used: Bupivacaine 0.5% and Triamcinolone 40 mg     Expiration Date:  Jan 2021, October 2021

## 2020-07-08 NOTE — PATIENT INSTRUCTIONS
Patient Education     Living with Osteoarthritis    Osteoarthritis is a chronic disease and the most common type of arthritis. But it doesn t have to keep you from leading an active life. You can help control symptoms by exercising and losing weight if you are overweight. Using special tools also helps make life easier. Be sure to see your healthcare provider for scheduled checkups and lab work. If you have questions or concerns between office visits, call your healthcare provider's office.  Make exercise part of your life  Gentle exercise can help lessen your pain. Keep the following in mind:    Choose exercises that improve joint motion and make your muscles stronger. Your healthcare provider or a physical therapist may suggest a few.    Stretching and flexibility activities such as yoga and catrachito chi may improve pain and joint motion.    Try low-impact sports, such as walking, biking, or doing exercises in a warm pool.    Most people should exercise for at least 30 minutes a day on most days of the week. This can be broken up into shorter periods throughout the day.    Don t push yourself too hard at first. Slowly build up over time.    Make sure you warm up for 5 to 10 minutes before you exercise.    If pain and stiffness increase, don't exercise as hard or as long.  Watch your weight  If you weigh more than you should, your weight-bearing joints are under extra pressure. This makes your symptoms worse. To reduce pain and stiffness, try losing a few of those extra pounds. The tips below may help:    Start a weight-loss program with the help of your healthcare provider.    Ask your friends and family for support.    Join a weight-loss group.  Use special tools  Even simple tasks can be hard to do when your joints hurt. Special tools called assistive devices can make things easier by reducing strain and protecting your joints. Ask your healthcare provider where to find these and other helpful  tools:    Long-handled reachers or grabbers    Jar openers and button threaders    Large  for pencils, garden tools, and other handheld objects  Use mobility and other aids  People with arthritis and other joint problems often use mobility aids to help with walking. For example, they may use canes or walkers. They may also use splints or braces to support joints. Talk with your healthcare provider or physical therapist about these aids:    A cane to reduce knee or hip pain and help prevent falls    Splints for your wrists or other joints    A brace to support a weak knee joint    Orthotics for toe and foot involvement  Medical and surgical treatments  Discuss medical treatments with your healthcare provider to help reduce your pain and improve joint mobility. Treatments may include:    Topical medicines such as lidocaine, capsaicin, and diclofenac gel    Oral medicines such as acetaminophen, NSAIDs (nonsteroidal anti-inflammatory drugs) such as ibuprofen and naproxen, or opioids    Injections in affected joints such as corticosteroids in various joints, or hyaluronic acid in the knee joints    Surgical repair or surgical joint replacement with artificial joints    Complementary therapies such as heat and cold treatment, massage, acupuncture, supplements, cognitive training, meditation, and others. Discuss these options with your healthcare provider.  Date Last Reviewed: 6/1/2018 2000-2019 [employer]. [address] [place] [address], [place] [address]. All rights reserved. This information is not intended as a substitute for professional medical care. Always follow your healthcare professional's instructions.

## 2020-07-08 NOTE — PROGRESS NOTES
"Subjective     Mary Art is a 74 year old female who presents to clinic today for the following health issues:    HPI   Musculoskeletal problem/pain      Duration: \"long time\"     Description  Location: left knee, entire joint. Worse on the medial side.      Intensity:  moderate    Accompanying signs and symptoms: none. No fevers, chills. No radiation of pain.     History  Previous similar problem: YES  Previous evaluation:  orthopedic evaluation    Precipitating or alleviating factors:  Trauma or overuse: YES - fall in 2004  Aggravating factors include: overuse    Therapies tried and outcome: injections with improvement. Diclofenac gel.     Patient Active Problem List   Diagnosis     Essential hypertension with goal blood pressure less than 140/90     CARDIOVASCULAR SCREENING; LDL GOAL LESS THAN 130     History of elevated glucose     HL (hearing loss)     Vaginal prolapse     Pessary maintenance     Advanced directives, counseling/discussion     Axillary adenopathy     Family history of colon cancer; sister, dx 2014, pt with no screening yet     Parent refuses immunizations     Past Surgical History:   Procedure Laterality Date     HYSTERECTOMY TOTAL ABDOMINAL  1991    Total abdominal hysterectomy & bilateral salpingo-oophorectomy     KNEE SURGERY  2003    Knee (L)       Social History     Tobacco Use     Smoking status: Never Smoker     Smokeless tobacco: Never Used   Substance Use Topics     Alcohol use: No     Family History   Problem Relation Age of Onset     Hypertension Mother      Hypertension Father      Diabetes Father      Hypertension Sister      Hypertension Brother      C.A.D. No family hx of          Current Outpatient Medications   Medication Sig Dispense Refill     aspirin 81 MG tablet Take 1 tablet by mouth daily.        CENTRUM SILVER OR TABS ONE DAILY 90 Tab 3     CRANBERRY        diclofenac (VOLTAREN) 1 % topical gel Place 4 g onto the skin 4 times daily 150 g 2     estradiol (ESTRACE) 0.5 " "MG tablet Place 1 tablet in the vagina once weekly. 12 tablet 3     ibuprofen (ADVIL/MOTRIN) 600 MG tablet Take 1 tablet (600 mg) by mouth every 8 hours as needed for moderate pain 90 tablet 2     lisinopril-hydrochlorothiazide (PRINZIDE/ZESTORETIC) 10-12.5 MG tablet Take 1 tablet by mouth daily 90 tablet 3     VITAMIN C 500 MG OR TABS ONE TABLET DAILY 3 MONTHS 3     VITAMIN D PO Take 1 tablet by mouth daily.       Allergies   Allergen Reactions     Keflex [Cephalexin Monohydrate] Rash     Penicillins Hives       Reviewed and updated as needed this visit by Provider  Tobacco  Allergies  Meds  Problems  Med Hx  Surg Hx  Fam Hx         Review of Systems   Constitutional, HEENT, cardiovascular, pulmonary, gi and gu systems are negative, except as otherwise noted.      Objective    BP (!) 142/86 (Cuff Size: Adult Large)   Pulse 64   Temp 97  F (36.1  C) (Tympanic)   Resp 16   Ht 1.594 m (5' 2.75\")   Wt 76.7 kg (169 lb)   BMI 30.18 kg/m    Body mass index is 30.18 kg/m .  Physical Exam   Constitutional: healthy, alert, and no distress  Head: Normocephalic. Atraumatic  Eyes: No conjunctival injection, sclera anicteric  Respiratory: No resp distress.  Musculoskeletal: There is a mild effusion of the L knee. Tenderness throughout the medial and lateral joint lines. No erythema. FROM of the L knee.   Neurologic: Gait normal. CN 2-12 grossly intact  Psychiatric: mentation appears normal and affect normal/bright    Procedure Note: Knee Injection  - Options for treatment, risks, benefits discussed. Potential complications of injection including tendon damage, skin atrophy (thinning of skin and lighter skin color) and infection.  - Informed consent obtained.   - Knee landmarks noted. The area for injection was prepped with alcohol swab. Ethyl Chloride spray was used to anesthetize the area. 1 cc Kenalog and 4 cc 0.5% marcaine was injected into the knee joint. No complications.  Bandaid dressing applied.   - There " may be some discomfort in the next 24 hours after injection. Benefit should be apparant in the next 3 days or so.    - Relative rest for the area is recommended for the next 3 days.    Diagnostic Test Results:  none       Assessment & Plan   (M17.0) Primary osteoarthritis of both knees  (primary encounter diagnosis)  Comment: Pt comes in today for a knee injection. Just had the R knee done two days ago by me with relief of pain. No red flag signs or symptoms with the L knee today. Steroid injection done as above. Pt tolerated the procedure well. RETURN TO CLINIC in 3 month for repeat injection if needed. Continue to use diclofenac gel.   Plan: INJECTION INTRAMUSCULAR OR SUB-Q, bupivacaine         (MARCAINE) 0.5% injection MDV, triamcinolone         (KENALOG-40) injection 40 mg    Return in about 3 months (around 10/8/2020), or if symptoms worsen or fail to improve, for In-Clinic Visit.    Maurilio Dias PA-C  [facility]

## 2020-07-09 ENCOUNTER — OFFICE VISIT (OUTPATIENT)
Dept: OBGYN | Facility: CLINIC | Age: 75
End: 2020-07-09
Payer: MEDICARE

## 2020-07-09 VITALS
HEIGHT: 64 IN | DIASTOLIC BLOOD PRESSURE: 79 MMHG | WEIGHT: 169 LBS | RESPIRATION RATE: 18 BRPM | HEART RATE: 99 BPM | BODY MASS INDEX: 28.85 KG/M2 | SYSTOLIC BLOOD PRESSURE: 131 MMHG | TEMPERATURE: 98.6 F

## 2020-07-09 DIAGNOSIS — Z46.89 PESSARY MAINTENANCE: Primary | ICD-10-CM

## 2020-07-09 PROCEDURE — 99213 OFFICE O/P EST LOW 20 MIN: CPT | Performed by: OBSTETRICS & GYNECOLOGY

## 2020-07-09 ASSESSMENT — MIFFLIN-ST. JEOR: SCORE: 1233.64

## 2020-07-09 NOTE — PROGRESS NOTES
Mary is a 74 year old   female who presents for pessary recheck;  she currently has a ring with support pessary which she keeps in place continuously;  she reports no problems with her pessary, no discharge, no bleeding.. She is using vaginal estrogen once a week.    All systems were reviewed and pertinent information in noted in subjective/HPI.    Past Medical History:   Diagnosis Date     HTN        Past Surgical History:   Procedure Laterality Date     HYSTERECTOMY TOTAL ABDOMINAL      Total abdominal hysterectomy & bilateral salpingo-oophorectomy     KNEE SURGERY      Knee (L)       Current Outpatient Medications   Medication Sig Dispense Refill     aspirin 81 MG tablet Take 1 tablet by mouth daily.        CENTRUM SILVER OR TABS ONE DAILY 90 Tab 3     CRANBERRY        diclofenac (VOLTAREN) 1 % topical gel Place 4 g onto the skin 4 times daily 150 g 2     estradiol (ESTRACE) 0.5 MG tablet Place 1 tablet in the vagina once weekly. 12 tablet 3     ibuprofen (ADVIL/MOTRIN) 600 MG tablet Take 1 tablet (600 mg) by mouth every 8 hours as needed for moderate pain 90 tablet 2     lisinopril-hydrochlorothiazide (PRINZIDE/ZESTORETIC) 10-12.5 MG tablet Take 1 tablet by mouth daily 90 tablet 3     VITAMIN C 500 MG OR TABS ONE TABLET DAILY 3 MONTHS 3     VITAMIN D PO Take 1 tablet by mouth daily.         Social History     Socioeconomic History     Marital status:      Spouse name: None     Number of children: None     Years of education: None     Highest education level: None   Occupational History     None   Social Needs     Financial resource strain: None     Food insecurity     Worry: None     Inability: None     Transportation needs     Medical: None     Non-medical: None   Tobacco Use     Smoking status: Never Smoker     Smokeless tobacco: Never Used   Substance and Sexual Activity     Alcohol use: No     Drug use: No     Sexual activity: Yes     Partners: Male   Lifestyle     Physical activity     " Days per week: None     Minutes per session: None     Stress: None   Relationships     Social connections     Talks on phone: None     Gets together: None     Attends Bahai service: None     Active member of club or organization: None     Attends meetings of clubs or organizations: None     Relationship status: None     Intimate partner violence     Fear of current or ex partner: None     Emotionally abused: None     Physically abused: None     Forced sexual activity: None   Other Topics Concern      Service No     Blood Transfusions No     Caffeine Concern No     Comment: 0--2 cups     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 No   Social History Narrative     None       Family History   Problem Relation Age of Onset     Hypertension Mother      Hypertension Father      Diabetes Father      Hypertension Sister      Hypertension Brother      C.A.D. No family hx of        OBJECTIVE: /79 (BP Location: Right arm, Patient Position: Chair, Cuff Size: Adult Large)   Pulse 99   Temp 98.6  F (37  C) (Oral)   Resp 18   Ht 1.613 m (5' 3.5\")   Wt 76.7 kg (169 lb)   BMI 29.47 kg/m    No LMP recorded. Patient has had a hysterectomy.  The pessary was removed without difficulty, cleaned in warm water and then replaced, after inspection of the vulva and vagina, which showed no erosions.    ASSESSMENT:     ICD-10-CM    1. Pessary maintenance  Z46.89          PLAN: RETURN TO CLINIC every 4-6 mons for pessary cleaning and care    Bridgette Jeff MD      "

## 2020-07-09 NOTE — NURSING NOTE
"Initial /79 (BP Location: Right arm, Patient Position: Chair, Cuff Size: Adult Large)   Pulse 99   Temp 98.6  F (37  C) (Oral)   Resp 18   Ht 1.613 m (5' 3.5\")   Wt 76.7 kg (169 lb)   BMI 29.47 kg/m   Estimated body mass index is 29.47 kg/m  as calculated from the following:    Height as of this encounter: 1.613 m (5' 3.5\").    Weight as of this encounter: 76.7 kg (169 lb). .      "

## 2020-07-14 ENCOUNTER — DOCUMENTATION ONLY (OUTPATIENT)
Dept: LAB | Facility: CLINIC | Age: 75
End: 2020-07-14

## 2020-07-14 DIAGNOSIS — Z86.39 HISTORY OF ELEVATED GLUCOSE: Primary | ICD-10-CM

## 2020-07-14 NOTE — PROGRESS NOTES
Im not really sure that I talked to her about labs. We just talked about her her knee pain and I did injections.     She is due for an A1c check, so I ordered that. I am not sure what other labs she needs, if any.     Maurilio Dias PA-C

## 2020-07-16 DIAGNOSIS — Z86.39 HISTORY OF ELEVATED GLUCOSE: ICD-10-CM

## 2020-07-16 LAB — HBA1C MFR BLD: 6.8 % (ref 0–5.6)

## 2020-07-16 PROCEDURE — 36415 COLL VENOUS BLD VENIPUNCTURE: CPT | Performed by: PHYSICIAN ASSISTANT

## 2020-07-16 PROCEDURE — 83036 HEMOGLOBIN GLYCOSYLATED A1C: CPT | Performed by: PHYSICIAN ASSISTANT

## 2020-09-08 ENCOUNTER — OFFICE VISIT (OUTPATIENT)
Dept: FAMILY MEDICINE | Facility: CLINIC | Age: 75
End: 2020-09-08
Payer: MEDICARE

## 2020-09-08 VITALS
HEIGHT: 64 IN | WEIGHT: 167.6 LBS | TEMPERATURE: 97.3 F | RESPIRATION RATE: 20 BRPM | DIASTOLIC BLOOD PRESSURE: 84 MMHG | BODY MASS INDEX: 28.61 KG/M2 | HEART RATE: 82 BPM | SYSTOLIC BLOOD PRESSURE: 136 MMHG | OXYGEN SATURATION: 99 %

## 2020-09-08 DIAGNOSIS — I10 ESSENTIAL HYPERTENSION WITH GOAL BLOOD PRESSURE LESS THAN 140/90: ICD-10-CM

## 2020-09-08 DIAGNOSIS — Z00.00 ENCOUNTER FOR MEDICARE ANNUAL WELLNESS EXAM: Primary | ICD-10-CM

## 2020-09-08 DIAGNOSIS — N81.10 VAGINAL PROLAPSE: ICD-10-CM

## 2020-09-08 LAB
ANION GAP SERPL CALCULATED.3IONS-SCNC: 5 MMOL/L (ref 3–14)
BUN SERPL-MCNC: 20 MG/DL (ref 7–30)
CALCIUM SERPL-MCNC: 9.1 MG/DL (ref 8.5–10.1)
CHLORIDE SERPL-SCNC: 104 MMOL/L (ref 94–109)
CO2 SERPL-SCNC: 28 MMOL/L (ref 20–32)
CREAT SERPL-MCNC: 0.7 MG/DL (ref 0.52–1.04)
GFR SERPL CREATININE-BSD FRML MDRD: 84 ML/MIN/{1.73_M2}
GLUCOSE SERPL-MCNC: 121 MG/DL (ref 70–99)
POTASSIUM SERPL-SCNC: 4 MMOL/L (ref 3.4–5.3)
SODIUM SERPL-SCNC: 137 MMOL/L (ref 133–144)

## 2020-09-08 PROCEDURE — 36415 COLL VENOUS BLD VENIPUNCTURE: CPT | Performed by: PHYSICIAN ASSISTANT

## 2020-09-08 PROCEDURE — 80048 BASIC METABOLIC PNL TOTAL CA: CPT | Performed by: PHYSICIAN ASSISTANT

## 2020-09-08 PROCEDURE — G0439 PPPS, SUBSEQ VISIT: HCPCS | Performed by: PHYSICIAN ASSISTANT

## 2020-09-08 RX ORDER — LISINOPRIL/HYDROCHLOROTHIAZIDE 10-12.5 MG
1 TABLET ORAL DAILY
Qty: 90 TABLET | Refills: 3 | Status: SHIPPED | OUTPATIENT
Start: 2020-09-08 | End: 2021-08-20

## 2020-09-08 RX ORDER — ESTRADIOL 0.5 MG/1
TABLET ORAL
Qty: 12 TABLET | Refills: 3 | Status: SHIPPED | OUTPATIENT
Start: 2020-09-08 | End: 2021-08-05

## 2020-09-08 ASSESSMENT — MIFFLIN-ST. JEOR: SCORE: 1227.29

## 2020-09-08 NOTE — LETTER
September 13, 2020      Mary Art  [address]  [facility] [address]        Dear ,    We are writing to inform you of your test results.    Your test results fall within the expected range(s) or remain unchanged from previous results.  Please continue with current treatment plan.    Resulted Orders   Basic metabolic panel   Result Value Ref Range    Sodium 137 133 - 144 mmol/L    Potassium 4.0 3.4 - 5.3 mmol/L    Chloride 104 94 - 109 mmol/L    Carbon Dioxide 28 20 - 32 mmol/L    Anion Gap 5 3 - 14 mmol/L    Glucose 121 (H) 70 - 99 mg/dL    Urea Nitrogen 20 7 - 30 mg/dL    Creatinine 0.70 0.52 - 1.04 mg/dL    GFR Estimate 84 >60 mL/min/[1.73_m2]      Comment:      Non  GFR Calc  Starting 12/18/2018, serum creatinine based estimated GFR (eGFR) will be   calculated using the Chronic Kidney Disease Epidemiology Collaboration   (CKD-EPI) equation.      GFR Estimate If Black >90 >60 mL/min/[1.73_m2]      Comment:       GFR Calc  Starting 12/18/2018, serum creatinine based estimated GFR (eGFR) will be   calculated using the Chronic Kidney Disease Epidemiology Collaboration   (CKD-EPI) equation.      Calcium 9.1 8.5 - 10.1 mg/dL       If you have any questions or concerns, please call the clinic at the number listed above.       Sincerely,        Maurilio Dias PA-C

## 2020-09-08 NOTE — PROGRESS NOTES
"  SUBJECTIVE:   Mary Art is a 74 year old female who presents for Preventive Visit.    Are you in the first 12 months of your Medicare Part B coverage?  No    Physical Health:    In general, how would you rate your overall physical health? good    Outside of work, how many days during the week do you exercise? 2-3 days/week    Outside of work, approximately how many minutes a day do you exercise?30-45 minutes    If you drink alcohol do you typically have >3 drinks per day or >7 drinks per week? No    Do you usually eat at least 4 servings of fruit and vegetables a day, include whole grains & fiber and avoid regularly eating high fat or \"junk\" foods? Yes    Do you have any problems taking medications regularly?  No    Do you have any side effects from medications? none    Needs assistance for the following daily activities: no assistance needed    Which of the following safety concerns are present in your home?  none identified     Hearing impairment: Yes, patient has hearing aids     In the past 6 months, have you been bothered by leaking of urine? no    Mental Health:    In general, how would you rate your overall mental or emotional health? good  PHQ-2 Score:      Do you feel safe in your environment? Yes    Have you ever done Advance Care Planning? (For example, a Health Directive, POLST, or a discussion with a medical provider or your loved ones about your wishes): No, advance care planning information given to patient to review.  Patient plans to discuss their wishes with loved ones or provider.      Additional concerns to address?  No    Fall risk:  Fallen 2 or more times in the past year?: No  Any fall with injury in the past year?: No    Cognitive Screenin) Repeat 3 items (Leader, Season, Table)    2) Clock draw: ABNORMAL Clock says 11:15  3) 3 item recall: Recalls 2 objects   Results: ABNORMAL clock, 1-2 items recalled: PROBABLE COGNITIVE IMPAIRMENT, **INFORM PROVIDER**    Mini-CogTM Copyright S " "[employer]. Licensed by the author for use in [facility]; reprinted with permission (rishabh@[facility]). All rights reserved.      Do you have sleep apnea, excessive snoring or daytime drowsiness?: no      Reviewed and updated as needed this visit by clinical staff  Tobacco  Allergies  Meds  Med Hx  Surg Hx  Fam Hx  Soc Hx        Reviewed and updated as needed this visit by Provider        Social History     Tobacco Use     Smoking status: Never Smoker     Smokeless tobacco: Never Used   Substance Use Topics     Alcohol use: No                           Current providers sharing in care for this patient include:   Patient Care Team:  Heather Chen MD as PCP - General Camacho, Ricardo Rico MD as Assigned PCP    The following health maintenance items are reviewed in Epic and correct as of today:  Health Maintenance   Topic Date Due     DTAP/TDAP/TD IMMUNIZATION (1 - Tdap) 11/30/1968     ZOSTER IMMUNIZATION (1 of 2) 11/30/1993     PNEUMOCOCCAL IMMUNIZATION 65+ LOW/MEDIUM RISK (1 of 2 - PCV13) 11/30/2008     FALL RISK ASSESSMENT  09/20/2018     MEDICARE ANNUAL WELLNESS VISIT  09/26/2018     ADVANCE CARE PLANNING  11/04/2018     INFLUENZA VACCINE (1) 09/01/2020     COLORECTAL CANCER SCREENING  04/18/2021     LIPID  09/20/2022     DEXA  Completed     PHQ-2  Completed     IPV IMMUNIZATION  Aged Out     MENINGITIS IMMUNIZATION  Aged Out     HEPATITIS B IMMUNIZATION  Aged Out     Lab work is in process    ROS:  Constitutional, HEENT, cardiovascular, pulmonary, gi and gu systems are negative, except as otherwise noted.    OBJECTIVE:   /84 (BP Location: Right arm, Patient Position: Sitting, Cuff Size: Adult Large)   Pulse 82   Temp 97.3  F (36.3  C) (Tympanic)   Resp 20   Ht 1.613 m (5' 3.5\")   Wt 76 kg (167 lb 9.6 oz)   SpO2 99%   BMI 29.22 kg/m   Estimated body mass index is 29.22 kg/m  as calculated from the following:    Height as of this encounter: 1.613 m (5' 3.5\").    Weight as of this " "encounter: 76 kg (167 lb 9.6 oz).  EXAM:   Constitutional: healthy, alert, and no distress  Head: Normocephalic. Atraumatic  Eyes: No conjunctival injection, sclera anicteric  Cardiovascular: RRR. No murmurs, clicks, gallops, or rubs. No peripheral edema.   Respiratory: No resp distress. Lungs CTAB bilaterally.   Musculoskeletal: extremities normal- no gross deformities noted, and normal muscle tone  Skin: no suspicious lesions or rashes  Neurologic: Gait normal. CN 2-12 grossly intact  Psychiatric: mentation appears normal and affect normal/bright     Diagnostic Test Results:  BMP pending.     ASSESSMENT / PLAN:   (Z00.00) Encounter for Medicare annual wellness exam  (primary encounter diagnosis)  Comment: Healthy 74 yr old female here for Medicare Wellness Exam. Doing well. Mild cognitive impairment. Pt doing well at home. Stays active. Will monitor this. Declined all immunizations today. Preventative screenings all UTD.   Plan: Follow-up in 1 year for reevaluation.     (I10) Essential hypertension with goal blood pressure less than 140/90  Comment: Normotensive. Check BMP. Refilled zestortic today.   Plan: lisinopril-hydrochlorothiazide (ZESTORETIC)         10-12.5 MG tablet, Basic metabolic panel          (N81.10) Vaginal prolapse  Comment: Stable. Refilled Estrace.   Plan: estradiol (ESTRACE) 0.5 MG tablet          COUNSELING:  Reviewed preventive health counseling, as reflected in patient instructions       Regular exercise       Healthy diet/nutrition    Estimated body mass index is 29.22 kg/m  as calculated from the following:    Height as of this encounter: 1.613 m (5' 3.5\").    Weight as of this encounter: 76 kg (167 lb 9.6 oz).    Weight management plan: Discussed healthy diet and exercise guidelines    She reports that she has never smoked. She has never used smokeless tobacco.    Appropriate preventive services were discussed with this patient, including applicable screening as appropriate for " cardiovascular disease, diabetes, osteopenia/osteoporosis, and glaucoma.  As appropriate for age/gender, discussed screening for colorectal cancer, prostate cancer, breast cancer, and cervical cancer. Checklist reviewing preventive services available has been given to the patient.    Reviewed patients plan of care and provided an AVS. The Basic Care Plan (routine screening as documented in Health Maintenance) for Mary meets the Care Plan requirement. This Care Plan has been established and reviewed with the Patient.      Maurilio Dias PA-C  [facility]

## 2020-09-08 NOTE — PATIENT INSTRUCTIONS
Patient Education   Personalized Prevention Plan  You are due for the preventive services outlined below.  Your care team is available to assist you in scheduling these services.  If you have already completed any of these items, please share that information with your care team to update in your medical record.  Health Maintenance Due   Topic Date Due     Diptheria Tetanus Pertussis (DTAP/TDAP/TD) Vaccine (1 - Tdap) 11/30/1968     Zoster (Shingles) Vaccine (1 of 2) 11/30/1993     Pneumococcal Vaccine (1 of 2 - PCV13) 11/30/2008     FALL RISK ASSESSMENT  09/20/2018     Annual Wellness Visit  09/26/2018     Discuss Advance Care Planning  11/04/2018     Flu Vaccine (1) 09/01/2020

## 2021-01-04 NOTE — PATIENT INSTRUCTIONS
Plan:  - Today's Plan of Care:  Referral for possible synvisc injection  Schedule injection 2 weeks out    -We also discussed other future treatment options:  Referral to Orthopedic Surgery, Physical Therapy, Trial of additional corticosteroid injection    Follow Up: as needed    If you have any further questions for your physician or physician s care team you can call [phone] and use option 3 to leave a voice message. Calls received during business hours will be returned same day.     History of hand surgery  right middle and right index finger mass excision 4/26/18  S/P tubal ligation  15 yr ago

## 2021-03-30 ENCOUNTER — OFFICE VISIT (OUTPATIENT)
Dept: OBGYN | Facility: CLINIC | Age: 76
End: 2021-03-30
Payer: COMMERCIAL

## 2021-03-30 VITALS
HEIGHT: 63 IN | BODY MASS INDEX: 29.59 KG/M2 | DIASTOLIC BLOOD PRESSURE: 88 MMHG | SYSTOLIC BLOOD PRESSURE: 139 MMHG | RESPIRATION RATE: 18 BRPM | WEIGHT: 167 LBS | HEART RATE: 78 BPM | TEMPERATURE: 96.5 F

## 2021-03-30 DIAGNOSIS — N81.10 VAGINAL PROLAPSE: ICD-10-CM

## 2021-03-30 DIAGNOSIS — Z46.89 PESSARY MAINTENANCE: Primary | ICD-10-CM

## 2021-03-30 PROCEDURE — 99213 OFFICE O/P EST LOW 20 MIN: CPT | Performed by: OBSTETRICS & GYNECOLOGY

## 2021-03-30 PROCEDURE — A4562 PESSARY, NON RUBBER,ANY TYPE: HCPCS | Performed by: OBSTETRICS & GYNECOLOGY

## 2021-03-30 ASSESSMENT — MIFFLIN-ST. JEOR: SCORE: 1207.67

## 2021-03-30 NOTE — PROGRESS NOTES
Mary is a 75 year old   female who presents for pessary recheck;  she currently has a ring wtiu support #6 pessary which she keeps in place continuously; she has not had it checked since last Summer;  she reports no problems with her pessary, no discharge, no bleeding.. She is using vaginal estrogen once a week.    All systems were reviewed and pertinent information in noted in subjective/HPI.    Past Medical History:   Diagnosis Date     HTN        Past Surgical History:   Procedure Laterality Date     HYSTERECTOMY TOTAL ABDOMINAL      Total abdominal hysterectomy & bilateral salpingo-oophorectomy     KNEE SURGERY      Knee (L)       Current Outpatient Medications   Medication Sig Dispense Refill     aspirin 81 MG tablet Take 1 tablet by mouth daily.        CENTRUM SILVER OR TABS ONE DAILY 90 Tab 3     CRANBERRY        estradiol (ESTRACE) 0.5 MG tablet Place 1 tablet in the vagina once weekly. 12 tablet 3     ibuprofen (ADVIL/MOTRIN) 600 MG tablet Take 1 tablet (600 mg) by mouth every 8 hours as needed for moderate pain 90 tablet 2     lisinopril-hydrochlorothiazide (ZESTORETIC) 10-12.5 MG tablet Take 1 tablet by mouth daily 90 tablet 3     VITAMIN C 500 MG OR TABS ONE TABLET DAILY 3 MONTHS 3     VITAMIN D PO Take 1 tablet by mouth daily.         Social History     Socioeconomic History     Marital status:      Spouse name: None     Number of children: None     Years of education: None     Highest education level: None   Occupational History     None   Social Needs     Financial resource strain: None     Food insecurity     Worry: None     Inability: None     Transportation needs     Medical: None     Non-medical: None   Tobacco Use     Smoking status: Never Smoker     Smokeless tobacco: Never Used   Substance and Sexual Activity     Alcohol use: No     Drug use: No     Sexual activity: Yes     Partners: Male   Lifestyle     Physical activity     Days per week: None     Minutes per session:  "None     Stress: None   Relationships     Social connections     Talks on phone: None     Gets together: None     Attends Hindu service: None     Active member of club or organization: None     Attends meetings of clubs or organizations: None     Relationship status: None     Intimate partner violence     Fear of current or ex partner: None     Emotionally abused: None     Physically abused: None     Forced sexual activity: None   Other Topics Concern      Service No     Blood Transfusions No     Caffeine Concern No     Comment: 0--2 cups     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 No   Social History Narrative     None       Family History   Problem Relation Age of Onset     Hypertension Mother      Hypertension Father      Diabetes Father      Hypertension Sister      Hypertension Brother      C.A.D. No family hx of        OBJECTIVE: /88 (BP Location: Left arm, Patient Position: Chair, Cuff Size: Adult Regular)   Pulse 78   Temp 96.5  F (35.8  C) (Tympanic)   Resp 18   Ht 1.594 m (5' 2.75\")   Wt 75.8 kg (167 lb)   Breastfeeding No   BMI 29.82 kg/m    No LMP recorded. Patient has had a hysterectomy.  The pessary was removed with some difficulty, cleaned in warm water and then replaced with a new size #5 ring witu support after inspection of the vulva and vagina, which showed superficial erosions at right side wall apex.    ASSESSMENT:     ICD-10-CM    1. Pessary maintenance  Z46.89          PLAN: f/u in 4 months; continue weekly estradiol    Bridgette Jeff MD      "

## 2021-03-30 NOTE — NURSING NOTE
"Initial /88 (BP Location: Left arm, Patient Position: Chair, Cuff Size: Adult Regular)   Pulse 78   Temp 96.5  F (35.8  C) (Tympanic)   Resp 18   Ht 1.594 m (5' 2.75\")   Wt 75.8 kg (167 lb)   Breastfeeding No   BMI 29.82 kg/m   Estimated body mass index is 29.82 kg/m  as calculated from the following:    Height as of this encounter: 1.594 m (5' 2.75\").    Weight as of this encounter: 75.8 kg (167 lb). .      "

## 2021-05-06 ENCOUNTER — OFFICE VISIT (OUTPATIENT)
Dept: URGENT CARE | Facility: URGENT CARE | Age: 76
End: 2021-05-06
Payer: COMMERCIAL

## 2021-05-06 VITALS
SYSTOLIC BLOOD PRESSURE: 130 MMHG | WEIGHT: 164.4 LBS | DIASTOLIC BLOOD PRESSURE: 80 MMHG | HEART RATE: 75 BPM | RESPIRATION RATE: 18 BRPM | HEIGHT: 63 IN | BODY MASS INDEX: 29.13 KG/M2 | TEMPERATURE: 97.9 F | OXYGEN SATURATION: 98 %

## 2021-05-06 DIAGNOSIS — R30.0 DYSURIA: ICD-10-CM

## 2021-05-06 DIAGNOSIS — N30.00 ACUTE CYSTITIS WITHOUT HEMATURIA: Primary | ICD-10-CM

## 2021-05-06 LAB
ALBUMIN UR-MCNC: NEGATIVE MG/DL
APPEARANCE UR: CLEAR
BACTERIA #/AREA URNS HPF: ABNORMAL /HPF
BILIRUB UR QL STRIP: NEGATIVE
COLOR UR AUTO: YELLOW
GLUCOSE UR STRIP-MCNC: NEGATIVE MG/DL
HGB UR QL STRIP: NEGATIVE
KETONES UR STRIP-MCNC: NEGATIVE MG/DL
LEUKOCYTE ESTERASE UR QL STRIP: ABNORMAL
NITRATE UR QL: NEGATIVE
NON-SQ EPI CELLS #/AREA URNS LPF: ABNORMAL /LPF
PH UR STRIP: 5.5 PH (ref 5–7)
RBC #/AREA URNS AUTO: ABNORMAL /HPF
SOURCE: ABNORMAL
SP GR UR STRIP: >1.03 (ref 1–1.03)
UROBILINOGEN UR STRIP-ACNC: 0.2 EU/DL (ref 0.2–1)
WBC #/AREA URNS AUTO: ABNORMAL /HPF

## 2021-05-06 PROCEDURE — 99213 OFFICE O/P EST LOW 20 MIN: CPT | Performed by: NURSE PRACTITIONER

## 2021-05-06 PROCEDURE — 81001 URINALYSIS AUTO W/SCOPE: CPT | Performed by: NURSE PRACTITIONER

## 2021-05-06 RX ORDER — ACETAMINOPHEN 325 MG/1
325-650 TABLET ORAL EVERY 6 HOURS PRN
COMMUNITY

## 2021-05-06 RX ORDER — SULFAMETHOXAZOLE/TRIMETHOPRIM 800-160 MG
1 TABLET ORAL 2 TIMES DAILY
Qty: 14 TABLET | Refills: 0 | Status: SHIPPED | OUTPATIENT
Start: 2021-05-06 | End: 2021-05-13

## 2021-05-06 ASSESSMENT — MIFFLIN-ST. JEOR: SCORE: 1195.87

## 2021-05-06 NOTE — PROGRESS NOTES
"    Assessment & Plan   Problem List Items Addressed This Visit     None      Visit Diagnoses     Acute cystitis without hematuria    -  Primary    Relevant Medications    sulfamethoxazole-trimethoprim (BACTRIM DS) 800-160 MG tablet    Dysuria        Relevant Orders    *UA reflex to Microscopic and Culture ([place] [facility] (except [place] and [place]) (Completed)    Urine Microscopic (Completed)           15 minutes spent on the date of the encounter doing chart review, history and exam, documentation and further activities per the note       BMI:   Estimated body mass index is 29.35 kg/m  as calculated from the following:    Height as of this encounter: 1.594 m (5' 2.75\").    Weight as of this encounter: 74.6 kg (164 lb 6.4 oz).       Patient Instructions   Antibiotics as directed   Urine culture is pending, will contact you if there is a change in treatment therapy.   Drink plenty of fluids. Prevention and treatment of UTI's discussed.   Signs and symptoms of pyelonephritis mentioned.   RTC if any worsening symptoms or if not improving as expected.     Patient Education     Bladder Infection, Female (Adult)     Urine normally doesn't have any germs (bacteria) in it. But bacteria can get into the urinary tract from the skin around the rectum. Or they can travel in the blood from other parts of the body. Once they are in your urinary tract, they can cause infection in these areas:    The urethra (urethritis)    The bladder (cystitis)    The kidneys (pyelonephritis)  The most common place for an infection is in the bladder. This is called a bladder infection. This is one of the most common infections in women. Most bladder infections are easily treated. They are not serious unless the infection spreads to the kidney.  The terms bladder infection, UTI, and cystitis are often used to describe the same thing. But they are not always the same. Cystitis is an inflammation of the bladder. The most common " cause of cystitis is an infection.  Symptoms  The infection causes inflammation in the urethra and bladder. This causes many of the symptoms. The most common symptoms of a bladder infection are:    Pain or burning when urinating    Having to urinate more often than normal    Urgent need to urinate    Only a small amount of urine comes out    Blood in urine    Belly (abdominal) discomfort. This is often in the lower belly above the pubic bone.    Cloudy urine    Strong- or bad-smelling urine    Unable to urinate (urinary retention)    Unable to hold urine in (urinary incontinence)    Fever    Loss of appetite    Confusion (in older adults)  Causes  Bladder infections are not contagious. You can't get one from someone else, from a toilet seat, or from sharing a bath.  The most common cause of bladder infections is bacteria from the bowels. The bacteria get onto the skin around the opening of the urethra. From there, they can get into the urine. Then they travel up to the bladder, causing inflammation and infection. This often happens because of:    Wiping incorrectly after urinating. Always wipe from front to back.    Bowel incontinence    Pregnancy    Procedures such as having a catheter put in    Older age    Not emptying your bladder. This can give bacteria a chance to grow in your urine.    Fluid loss (dehydration)    Constipation    Having sex    Using a diaphragm for birth control   Treatment  Bladder infections are diagnosed by a urine test and urine culture. They are treated with antibiotics. They often clear up quickly without problems. Treatment helps prevent a more serious kidney infection.  Medicines  Medicines can help in the treatment of a bladder infection:    Take antibiotics until they are used up, even if you feel better. It's important to finish them to make sure the infection has cleared.    You can use acetaminophen or ibuprofen for pain, fever, or discomfort, unless another medicine was  prescribed. If you have long-term (chronic) liver or kidney disease, talk with your healthcare provider before using these medicines. Also talk with your provider if you've ever had a stomach ulcer or GI (gastrointestinal) bleeding, or are taking blood-thinner medicines.    If you are given phenazopydridine to reduce burning with urination, it will make your urine a bright orange color. This can stain clothing.  Care and prevention  These self-care steps can help prevent future infections:    Drink plenty of fluids. This helps to prevent dehydration and flush out your bladder. Do this unless you must restrict fluids for other health reasons, or your healthcare provider told you not to.    Clean yourself correctly after going to the bathroom. Wipe from front to back after using the toilet. This helps prevent the spread of bacteria.    Urinate more often. Don't try to hold urine in for a long time.    Wear loose-fitting clothes and cotton underwear. Don't wear tight-fitting pants.    Improve your diet and prevent constipation. Eat more fresh fruits and vegetables, and fiber. Eat less junk foods and fatty foods.    Don't have sex until your symptoms are gone.    Don't have caffeine, alcohol, and spicy foods. These can irritate your bladder.    Urinate right after you have sex to flush out your bladder.    If you use birth control pills and have frequent bladder infections, discuss it with your healthcare provider.  Follow-up care  Call your healthcare provider if all symptoms are not gone after 3 days of treatment. This is especially important if you have repeat infections.  If a culture was done, you will be told if your treatment needs to be changed. If directed, you can call to find out the results.  If X-rays were done, you will be told if the results will affect your treatment.  Call 911  Call 911 if any of the following occur:    Trouble breathing    Hard to wake up or confusion    Fainting (loss of  consciousness)    Fast heart rate  When to get medical advice  Call your healthcare provider right away if any of these occur:    Fever of 100.4 F (38.0 C) or higher, or as directed by your healthcare provider    Symptoms are not better after 3 days of treatment    Back or belly pain that gets worse    Repeated vomiting, or unable to keep medicine down    Weakness or dizziness    Vaginal discharge    Pain, redness, or swelling in the outer vaginal area (labia)  [employer] last reviewed this educational content on 11/1/2019 2000-2021 [employer]. All rights reserved. This information is not intended as a substitute for professional medical care. Always follow your healthcare professional's instructions.           Patient Education     Urinary Tract Infections in Women  Urinary tract infections (UTIs) are most often caused by bacteria. These bacteria enter the urinary tract. The bacteria may come from inside the body. Or they may travel from the skin outside the rectum or vagina into the urethra. Female anatomy makes it easy for bacteria from the bowel to enter a woman s urinary tract, which is the most common source of UTI. This means women develop UTIs more often than men. Pain in or around the urinary tract is a common UTI symptom. But the only way to know for sure if you have a UTI for the healthcare provider to test your urine. The two tests that may be done are the urinalysis and urine culture.     Types of UTIs    Cystitis. A bladder infection (cystitis) is the most common UTI in women. You may have urgent or frequent need to pee. You may also have pain, burning when you pee, and bloody urine.    Urethritis. This is an inflamed urethra, which is the tube that carries urine from the bladder to outside the body. You may have lower stomach or back pain. You may also have urgent or frequent need to pee.    Pyelonephritis. This is a kidney infection. If not treated, it can be serious and damage your  kidneys. In severe cases, you may need to stay in the hospital. You may have a fever and lower back pain.    Medicines to treat a UTI  Most UTIs are treated with antibiotics. These kill the bacteria. The length of time you need to take them depends on the type of infection. It may be as short as 3 days. If you have repeated UTIs, you may need a low-dose antibiotic for several months. Take antibiotics exactly as directed. Don t stop taking them until all of the medicine is gone. If you stop taking the antibiotic too soon, the infection may not go away. You may also develop a resistance to the antibiotic. This can make it much harder to treat.   Lifestyle changes to treat and prevent UTIs   The lifestyle changes below will help get rid of your UTI. They may also help prevent future UTIs.     Drink plenty of fluids. This includes water, juice, or other caffeine-free drinks. Fluids help flush bacteria out of your body.    Empty your bladder. Always empty your bladder when you feel the urge to pee. And always pee before going to sleep. Urine that stays in your bladder can lead to infection. Try to pee before and after sex as well.    Practice good personal hygiene. Wipe yourself from front to back after using the toilet. This helps keep bacteria from getting into the urethra.    Use condoms during sex. These help prevent UTIs caused by sexually transmitted bacteria. Also don't use spermicides during sex. These can increase the risk for UTIs. Choose other forms of birth control instead. For women who tend to get UTIs after sex, a low-dose of a preventive antibiotic may be used. Be sure to discuss this option with your healthcare provider.    Follow up with your healthcare provider as directed. He or she may test to make sure the infection has cleared. If needed, more treatment may be started.  [employer] last reviewed this educational content on 7/1/2019 2000-2021 [employer]. All rights reserved. This  "information is not intended as a substitute for professional medical care. Always follow your healthcare professional's instructions.               Return in about 1 week (around 5/13/2021), or if symptoms worsen or fail to improve, for Follow up with your primary care provider.    REYES Thomas CNP  [facility]    Criss Hernandez is a 75 year old who presents for the following health issues     HPI     Chief Complaint   Patient presents with     Urinary Problem     4 days Patient has been having left flank pain, not much comes out it dribbles.         Review of Systems   Constitutional, HEENT, cardiovascular, pulmonary, GI, , musculoskeletal, neuro, skin, endocrine and psych systems are negative, except as otherwise noted.      Objective    /80 (BP Location: Right arm, Patient Position: Sitting, Cuff Size: Adult Regular)   Pulse 75   Temp 97.9  F (36.6  C) (Tympanic)   Resp 18   Ht 1.594 m (5' 2.75\")   Wt 74.6 kg (164 lb 6.4 oz)   SpO2 98%   BMI 29.35 kg/m    Body mass index is 29.35 kg/m .  Physical Exam   GENERAL: healthy, alert and no distress, nontoxic in appearance  EYES: Eyes grossly normal to inspection, PERRL and conjunctivae and sclerae normal  HENT: normocephalic  NECK: supple with full ROM  RESP: lungs clear to auscultation - no rales, rhonchi or wheezes  CV: regular rate and rhythm, normal S1 S2, no S3 or S4, no murmur, click or rub, no peripheral edema   ABDOMEN: soft, nontender, no hepatosplenomegaly, no masses and bowel sounds normal  MS: no gross musculoskeletal defects noted, no edema  Neg CVA tenderness    Results for orders placed or performed in visit on 05/06/21 (from the past 24 hour(s))   *UA reflex to Microscopic and Culture ([place] and [facility] (except [place] and [place])    Specimen: Midstream Urine   Result Value Ref Range    Color Urine Yellow     Appearance Urine Clear     Glucose Urine Negative NEG^Negative mg/dL "    Bilirubin Urine Negative NEG^Negative    Ketones Urine Negative NEG^Negative mg/dL    Specific Gravity Urine >1.030 1.003 - 1.035    Blood Urine Negative NEG^Negative    pH Urine 5.5 5.0 - 7.0 pH    Protein Albumin Urine Negative NEG^Negative mg/dL    Urobilinogen Urine 0.2 0.2 - 1.0 EU/dL    Nitrite Urine Negative NEG^Negative    Leukocyte Esterase Urine Small (A) NEG^Negative    Source Midstream Urine    Urine Microscopic   Result Value Ref Range    WBC Urine 5-10 (A) OTO5^0 - 5 /HPF    RBC Urine O - 2 OTO2^O - 2 /HPF    Squamous Epithelial /LPF Urine Few FEW^Few /LPF    Bacteria Urine Few (A) NEG^Negative /HPF

## 2021-05-06 NOTE — PATIENT INSTRUCTIONS
Antibiotics as directed   Urine culture is pending, will contact you if there is a change in treatment therapy.   Drink plenty of fluids. Prevention and treatment of UTI's discussed.   Signs and symptoms of pyelonephritis mentioned.   RTC if any worsening symptoms or if not improving as expected.     Patient Education     Bladder Infection, Female (Adult)     Urine normally doesn't have any germs (bacteria) in it. But bacteria can get into the urinary tract from the skin around the rectum. Or they can travel in the blood from other parts of the body. Once they are in your urinary tract, they can cause infection in these areas:    The urethra (urethritis)    The bladder (cystitis)    The kidneys (pyelonephritis)  The most common place for an infection is in the bladder. This is called a bladder infection. This is one of the most common infections in women. Most bladder infections are easily treated. They are not serious unless the infection spreads to the kidney.  The terms bladder infection, UTI, and cystitis are often used to describe the same thing. But they are not always the same. Cystitis is an inflammation of the bladder. The most common cause of cystitis is an infection.  Symptoms  The infection causes inflammation in the urethra and bladder. This causes many of the symptoms. The most common symptoms of a bladder infection are:    Pain or burning when urinating    Having to urinate more often than normal    Urgent need to urinate    Only a small amount of urine comes out    Blood in urine    Belly (abdominal) discomfort. This is often in the lower belly above the pubic bone.    Cloudy urine    Strong- or bad-smelling urine    Unable to urinate (urinary retention)    Unable to hold urine in (urinary incontinence)    Fever    Loss of appetite    Confusion (in older adults)  Causes  Bladder infections are not contagious. You can't get one from someone else, from a toilet seat, or from sharing a bath.  The most  common cause of bladder infections is bacteria from the bowels. The bacteria get onto the skin around the opening of the urethra. From there, they can get into the urine. Then they travel up to the bladder, causing inflammation and infection. This often happens because of:    Wiping incorrectly after urinating. Always wipe from front to back.    Bowel incontinence    Pregnancy    Procedures such as having a catheter put in    Older age    Not emptying your bladder. This can give bacteria a chance to grow in your urine.    Fluid loss (dehydration)    Constipation    Having sex    Using a diaphragm for birth control   Treatment  Bladder infections are diagnosed by a urine test and urine culture. They are treated with antibiotics. They often clear up quickly without problems. Treatment helps prevent a more serious kidney infection.  Medicines  Medicines can help in the treatment of a bladder infection:    Take antibiotics until they are used up, even if you feel better. It's important to finish them to make sure the infection has cleared.    You can use acetaminophen or ibuprofen for pain, fever, or discomfort, unless another medicine was prescribed. If you have long-term (chronic) liver or kidney disease, talk with your healthcare provider before using these medicines. Also talk with your provider if you've ever had a stomach ulcer or GI (gastrointestinal) bleeding, or are taking blood-thinner medicines.    If you are given phenazopydridine to reduce burning with urination, it will make your urine a bright orange color. This can stain clothing.  Care and prevention  These self-care steps can help prevent future infections:    Drink plenty of fluids. This helps to prevent dehydration and flush out your bladder. Do this unless you must restrict fluids for other health reasons, or your healthcare provider told you not to.    Clean yourself correctly after going to the bathroom. Wipe from front to back after using the  toilet. This helps prevent the spread of bacteria.    Urinate more often. Don't try to hold urine in for a long time.    Wear loose-fitting clothes and cotton underwear. Don't wear tight-fitting pants.    Improve your diet and prevent constipation. Eat more fresh fruits and vegetables, and fiber. Eat less junk foods and fatty foods.    Don't have sex until your symptoms are gone.    Don't have caffeine, alcohol, and spicy foods. These can irritate your bladder.    Urinate right after you have sex to flush out your bladder.    If you use birth control pills and have frequent bladder infections, discuss it with your healthcare provider.  Follow-up care  Call your healthcare provider if all symptoms are not gone after 3 days of treatment. This is especially important if you have repeat infections.  If a culture was done, you will be told if your treatment needs to be changed. If directed, you can call to find out the results.  If X-rays were done, you will be told if the results will affect your treatment.  Call 911  Call 911 if any of the following occur:    Trouble breathing    Hard to wake up or confusion    Fainting (loss of consciousness)    Fast heart rate  When to get medical advice  Call your healthcare provider right away if any of these occur:    Fever of 100.4 F (38.0 C) or higher, or as directed by your healthcare provider    Symptoms are not better after 3 days of treatment    Back or belly pain that gets worse    Repeated vomiting, or unable to keep medicine down    Weakness or dizziness    Vaginal discharge    Pain, redness, or swelling in the outer vaginal area (labia)  [employer] last reviewed this educational content on 11/1/2019 2000-2021 [employer]. All rights reserved. This information is not intended as a substitute for professional medical care. Always follow your healthcare professional's instructions.           Patient Education     Urinary Tract Infections in Women  Urinary  tract infections (UTIs) are most often caused by bacteria. These bacteria enter the urinary tract. The bacteria may come from inside the body. Or they may travel from the skin outside the rectum or vagina into the urethra. Female anatomy makes it easy for bacteria from the bowel to enter a woman s urinary tract, which is the most common source of UTI. This means women develop UTIs more often than men. Pain in or around the urinary tract is a common UTI symptom. But the only way to know for sure if you have a UTI for the healthcare provider to test your urine. The two tests that may be done are the urinalysis and urine culture.     Types of UTIs    Cystitis. A bladder infection (cystitis) is the most common UTI in women. You may have urgent or frequent need to pee. You may also have pain, burning when you pee, and bloody urine.    Urethritis. This is an inflamed urethra, which is the tube that carries urine from the bladder to outside the body. You may have lower stomach or back pain. You may also have urgent or frequent need to pee.    Pyelonephritis. This is a kidney infection. If not treated, it can be serious and damage your kidneys. In severe cases, you may need to stay in the hospital. You may have a fever and lower back pain.    Medicines to treat a UTI  Most UTIs are treated with antibiotics. These kill the bacteria. The length of time you need to take them depends on the type of infection. It may be as short as 3 days. If you have repeated UTIs, you may need a low-dose antibiotic for several months. Take antibiotics exactly as directed. Don t stop taking them until all of the medicine is gone. If you stop taking the antibiotic too soon, the infection may not go away. You may also develop a resistance to the antibiotic. This can make it much harder to treat.   Lifestyle changes to treat and prevent UTIs   The lifestyle changes below will help get rid of your UTI. They may also help prevent future UTIs.      Drink plenty of fluids. This includes water, juice, or other caffeine-free drinks. Fluids help flush bacteria out of your body.    Empty your bladder. Always empty your bladder when you feel the urge to pee. And always pee before going to sleep. Urine that stays in your bladder can lead to infection. Try to pee before and after sex as well.    Practice good personal hygiene. Wipe yourself from front to back after using the toilet. This helps keep bacteria from getting into the urethra.    Use condoms during sex. These help prevent UTIs caused by sexually transmitted bacteria. Also don't use spermicides during sex. These can increase the risk for UTIs. Choose other forms of birth control instead. For women who tend to get UTIs after sex, a low-dose of a preventive antibiotic may be used. Be sure to discuss this option with your healthcare provider.    Follow up with your healthcare provider as directed. He or she may test to make sure the infection has cleared. If needed, more treatment may be started.  [employer] last reviewed this educational content on 7/1/2019 2000-2021 [employer]. All rights reserved. This information is not intended as a substitute for professional medical care. Always follow your healthcare professional's instructions.

## 2021-05-10 ENCOUNTER — OFFICE VISIT (OUTPATIENT)
Dept: URGENT CARE | Facility: URGENT CARE | Age: 76
End: 2021-05-10
Payer: COMMERCIAL

## 2021-05-10 VITALS
HEART RATE: 79 BPM | HEIGHT: 63 IN | WEIGHT: 164 LBS | DIASTOLIC BLOOD PRESSURE: 78 MMHG | OXYGEN SATURATION: 96 % | SYSTOLIC BLOOD PRESSURE: 132 MMHG | RESPIRATION RATE: 16 BRPM | TEMPERATURE: 97 F | BODY MASS INDEX: 29.06 KG/M2

## 2021-05-10 DIAGNOSIS — R30.0 DYSURIA: Primary | ICD-10-CM

## 2021-05-10 LAB

## 2021-05-10 PROCEDURE — 87086 URINE CULTURE/COLONY COUNT: CPT | Performed by: PHYSICIAN ASSISTANT

## 2021-05-10 PROCEDURE — 81001 URINALYSIS AUTO W/SCOPE: CPT | Performed by: PHYSICIAN ASSISTANT

## 2021-05-10 PROCEDURE — 99213 OFFICE O/P EST LOW 20 MIN: CPT | Performed by: PHYSICIAN ASSISTANT

## 2021-05-10 RX ORDER — CIPROFLOXACIN 250 MG/1
250 TABLET, FILM COATED ORAL 2 TIMES DAILY
Qty: 6 TABLET | Refills: 0 | Status: SHIPPED | OUTPATIENT
Start: 2021-05-10 | End: 2021-05-13

## 2021-05-10 ASSESSMENT — ENCOUNTER SYMPTOMS
FATIGUE: 0
MYALGIAS: 0
EYE PAIN: 0
DIARRHEA: 0
SHORTNESS OF BREATH: 0
HEMATOCHEZIA: 0
EYE DISCHARGE: 0
PALPITATIONS: 0
FEVER: 0
SORE THROAT: 0
CONSTIPATION: 0
ARTHRALGIAS: 0
NAUSEA: 0
SINUS PAIN: 0
ABDOMINAL PAIN: 0
FREQUENCY: 1
WHEEZING: 0
BACK PAIN: 0
HEARTBURN: 0
EYE REDNESS: 0
RHINORRHEA: 0
CHILLS: 0
SINUS PRESSURE: 0
DYSURIA: 1
COUGH: 0
VOMITING: 0

## 2021-05-10 ASSESSMENT — MIFFLIN-ST. JEOR: SCORE: 1194.06

## 2021-05-11 LAB
BACTERIA SPEC CULT: NO GROWTH
Lab: NORMAL
SPECIMEN SOURCE: NORMAL

## 2021-08-05 ENCOUNTER — OFFICE VISIT (OUTPATIENT)
Dept: OBGYN | Facility: CLINIC | Age: 76
End: 2021-08-05
Payer: COMMERCIAL

## 2021-08-05 VITALS
TEMPERATURE: 98.5 F | WEIGHT: 164 LBS | SYSTOLIC BLOOD PRESSURE: 141 MMHG | HEART RATE: 87 BPM | BODY MASS INDEX: 29.06 KG/M2 | DIASTOLIC BLOOD PRESSURE: 75 MMHG | HEIGHT: 63 IN | RESPIRATION RATE: 18 BRPM

## 2021-08-05 DIAGNOSIS — N81.10 VAGINAL PROLAPSE: ICD-10-CM

## 2021-08-05 PROBLEM — E11.9 DIABETES MELLITUS, TYPE 2 (H): Status: ACTIVE | Noted: 2021-08-05

## 2021-08-05 PROCEDURE — 99213 OFFICE O/P EST LOW 20 MIN: CPT | Performed by: OBSTETRICS & GYNECOLOGY

## 2021-08-05 RX ORDER — ESTRADIOL 0.5 MG/1
TABLET ORAL
Qty: 24 TABLET | Refills: 3 | Status: SHIPPED | OUTPATIENT
Start: 2021-08-05 | End: 2021-09-09

## 2021-08-05 ASSESSMENT — MIFFLIN-ST. JEOR: SCORE: 1194.06

## 2021-08-05 NOTE — NURSING NOTE
"Initial BP (!) 141/75 (BP Location: Left arm, Patient Position: Chair, Cuff Size: Adult Regular)   Pulse 87   Temp 98.5  F (36.9  C) (Tympanic)   Resp 18   Ht 1.594 m (5' 2.75\")   Wt 74.4 kg (164 lb)   Breastfeeding No   BMI 29.28 kg/m   Estimated body mass index is 29.28 kg/m  as calculated from the following:    Height as of this encounter: 1.594 m (5' 2.75\").    Weight as of this encounter: 74.4 kg (164 lb). .      "

## 2021-08-05 NOTE — PROGRESS NOTES
Mary is a 75 year old   female who presents for pessary recheck;  she currently has a ring witu support #5 pessary which she keeps in place conitnuously;  she reports some problems with her pessary, no discharge, no bleeding, but has seen increased urinary incontinence since pessary downsized 4 months ago She is using vaginal estrogen once a week.    All systems were reviewed and pertinent information in noted in subjective/HPI.    Past Medical History:   Diagnosis Date     HTN        Past Surgical History:   Procedure Laterality Date     HYSTERECTOMY TOTAL ABDOMINAL      Total abdominal hysterectomy & bilateral salpingo-oophorectomy     KNEE SURGERY      Knee (L)       Current Outpatient Medications   Medication Sig Dispense Refill     acetaminophen (TYLENOL) 325 MG tablet Take 325-650 mg by mouth every 6 hours as needed for mild pain       aspirin 81 MG tablet Take 1 tablet by mouth daily.        CENTRUM SILVER OR TABS ONE DAILY 90 Tab 3     CRANBERRY        estradiol (ESTRACE) 0.5 MG tablet Place 1 tablet in the vagina twice weekly. 24 tablet 3     ibuprofen (ADVIL/MOTRIN) 600 MG tablet Take 1 tablet (600 mg) by mouth every 8 hours as needed for moderate pain 90 tablet 2     lisinopril-hydrochlorothiazide (ZESTORETIC) 10-12.5 MG tablet Take 1 tablet by mouth daily 90 tablet 3     VITAMIN C 500 MG OR TABS ONE TABLET DAILY 3 MONTHS 3     VITAMIN D PO Take 1 tablet by mouth daily.         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 No   Social History Narrative     None     Social Determinants of Health     Financial Resource Strain:      Difficulty of Paying Living Expenses:    Food Insecurity:      Worried About Running Out of Food in the Last Year:      Ran Out of Food in the Last Year:    Transportation Needs:      Lack of Transportation (Medical):      Lack of Transportation (Non-Medical):    Physical Activity:      Days of Exercise per Week:      Minutes of Exercise per Session:    Stress:      Feeling of Stress :    Social Connections:      Frequency of Communication with Friends and Family:      Frequency of Social Gatherings with Friends and Family:      Attends Taoist Services:      Active Member of Clubs or Organizations:      Attends Club or Organization Meetings:      Marital Status:    Intimate Partner Violence:      Fear of Current or Ex-Partner:      Emotionally Abused:      Physically Abused:      Sexually Abused:        Family History   Problem Relation Age of Onset     Hypertension Mother      Hypertension Father      Diabetes Father      Hypertension Sister      Hypertension Brother      C.A.D. No family hx of        OBJECTIVE: BP (!) 141/75 (BP Location: Left arm, Patient Position: Chair, Cuff Size: Adult Regular)   Pulse 87   Temp 98.5  F (36.9  C) (Tympanic)   Resp 18   Ht 1.594 m (5' 2.75\")   Wt 74.4 kg (164 lb)   Breastfeeding No   BMI 29.28 kg/m    No LMP recorded. Patient has had a hysterectomy.  The pessary was removed without difficulty, cleaned in warm water and then " replaced with a size 6 ring with support,after inspection of the vulva and vagina, which showed no erosions.    ASSESSMENT:     ICD-10-CM    1. Vaginal prolapse  N81.10 estradiol (ESTRACE) 0.5 MG tablet         PLAN: recommend increased vaginal estradiol to twice a week  F/u 4 months  If still incontinence, consider ring with support and knob    Bridgette Jeff MD

## 2021-08-20 ENCOUNTER — TELEPHONE (OUTPATIENT)
Dept: FAMILY MEDICINE | Facility: CLINIC | Age: 76
End: 2021-08-20

## 2021-08-20 DIAGNOSIS — I10 ESSENTIAL HYPERTENSION WITH GOAL BLOOD PRESSURE LESS THAN 140/90: ICD-10-CM

## 2021-08-20 RX ORDER — LISINOPRIL/HYDROCHLOROTHIAZIDE 10-12.5 MG
1 TABLET ORAL DAILY
Qty: 30 TABLET | Refills: 0 | Status: SHIPPED | OUTPATIENT
Start: 2021-08-20 | End: 2021-09-09

## 2021-08-20 NOTE — TELEPHONE ENCOUNTER
Medication is being filled for 1 time refill only due to:  Patient needs to be seen because due for appt and labs.   Has appt on 09/09/21  BP Readings from Last 3 Encounters:   08/05/21 (!) 141/75   05/10/21 132/78   05/06/21 130/80     Last Comprehensive Metabolic Panel:  Sodium   Date Value Ref Range Status   09/08/2020 137 133 - 144 mmol/L Final     Potassium   Date Value Ref Range Status   09/08/2020 4.0 3.4 - 5.3 mmol/L Final     Chloride   Date Value Ref Range Status   09/08/2020 104 94 - 109 mmol/L Final     Carbon Dioxide   Date Value Ref Range Status   09/08/2020 28 20 - 32 mmol/L Final     Anion Gap   Date Value Ref Range Status   09/08/2020 5 3 - 14 mmol/L Final     Glucose   Date Value Ref Range Status   09/08/2020 121 (H) 70 - 99 mg/dL Final     Urea Nitrogen   Date Value Ref Range Status   09/08/2020 20 7 - 30 mg/dL Final     Creatinine   Date Value Ref Range Status   09/08/2020 0.70 0.52 - 1.04 mg/dL Final     GFR Estimate   Date Value Ref Range Status   09/08/2020 84 >60 mL/min/[1.73_m2] Final     Comment:     Non  GFR Calc  Starting 12/18/2018, serum creatinine based estimated GFR (eGFR) will be   calculated using the Chronic Kidney Disease Epidemiology Collaboration   (CKD-EPI) equation.       Calcium   Date Value Ref Range Status   09/08/2020 9.1 8.5 - 10.1 mg/dL Final     Jo GUNTER RN

## 2021-08-20 NOTE — TELEPHONE ENCOUNTER
Reason for Call:  Medication or medication refill:    Do you use a [facility]?  Name of the pharmacy and phone number for the current request:  [facility] - [phone]    Name of the medication requested: lisinopril-hydrochlorothiazide (ZESTORETIC) 10-12.5 MG tablet    Other request: Patient scheduled a physical on 09/09/21 and won't have enough medication to get her by tell then. Can she get a refill to get her by? Thank you    Can we leave a detailed message on this number? YES    Phone number patient can be reached at: Home number on file [phone] (home)    Best Time: anytime    Call taken on 8/20/2021 at 2:32 PM by Faith Ross

## 2021-09-09 ENCOUNTER — OFFICE VISIT (OUTPATIENT)
Dept: FAMILY MEDICINE | Facility: CLINIC | Age: 76
End: 2021-09-09
Payer: COMMERCIAL

## 2021-09-09 VITALS
WEIGHT: 163 LBS | HEART RATE: 84 BPM | RESPIRATION RATE: 20 BRPM | TEMPERATURE: 97.5 F | OXYGEN SATURATION: 98 % | DIASTOLIC BLOOD PRESSURE: 64 MMHG | HEIGHT: 63 IN | SYSTOLIC BLOOD PRESSURE: 106 MMHG | BODY MASS INDEX: 28.88 KG/M2

## 2021-09-09 DIAGNOSIS — I10 ESSENTIAL HYPERTENSION WITH GOAL BLOOD PRESSURE LESS THAN 140/90: ICD-10-CM

## 2021-09-09 DIAGNOSIS — N81.10 VAGINAL PROLAPSE: ICD-10-CM

## 2021-09-09 DIAGNOSIS — Z00.00 ENCOUNTER FOR MEDICARE ANNUAL WELLNESS EXAM: Primary | ICD-10-CM

## 2021-09-09 DIAGNOSIS — E11.9 TYPE 2 DIABETES MELLITUS WITHOUT COMPLICATION, WITHOUT LONG-TERM CURRENT USE OF INSULIN (H): ICD-10-CM

## 2021-09-09 LAB
ANION GAP SERPL CALCULATED.3IONS-SCNC: 4 MMOL/L (ref 3–14)
BUN SERPL-MCNC: 16 MG/DL (ref 7–30)
CALCIUM SERPL-MCNC: 8.7 MG/DL (ref 8.5–10.1)
CHLORIDE BLD-SCNC: 105 MMOL/L (ref 94–109)
CHOLEST SERPL-MCNC: 155 MG/DL
CO2 SERPL-SCNC: 29 MMOL/L (ref 20–32)
CREAT SERPL-MCNC: 0.67 MG/DL (ref 0.52–1.04)
CREAT UR-MCNC: 71 MG/DL
FASTING STATUS PATIENT QL REPORTED: NORMAL
GFR SERPL CREATININE-BSD FRML MDRD: 85 ML/MIN/1.73M2
GLUCOSE BLD-MCNC: 137 MG/DL (ref 70–99)
HBA1C MFR BLD: 6.4 % (ref 0–5.6)
HDLC SERPL-MCNC: 70 MG/DL
LDLC SERPL CALC-MCNC: 61 MG/DL
MICROALBUMIN UR-MCNC: 10 MG/L
MICROALBUMIN/CREAT UR: 14.08 MG/G CR (ref 0–25)
NONHDLC SERPL-MCNC: 85 MG/DL
POTASSIUM BLD-SCNC: 3.7 MMOL/L (ref 3.4–5.3)
SODIUM SERPL-SCNC: 138 MMOL/L (ref 133–144)
TRIGL SERPL-MCNC: 118 MG/DL

## 2021-09-09 PROCEDURE — 80061 LIPID PANEL: CPT | Performed by: PHYSICIAN ASSISTANT

## 2021-09-09 PROCEDURE — 82043 UR ALBUMIN QUANTITATIVE: CPT | Performed by: PHYSICIAN ASSISTANT

## 2021-09-09 PROCEDURE — 99214 OFFICE O/P EST MOD 30 MIN: CPT | Mod: 25 | Performed by: PHYSICIAN ASSISTANT

## 2021-09-09 PROCEDURE — 36415 COLL VENOUS BLD VENIPUNCTURE: CPT | Performed by: PHYSICIAN ASSISTANT

## 2021-09-09 PROCEDURE — 80048 BASIC METABOLIC PNL TOTAL CA: CPT | Performed by: PHYSICIAN ASSISTANT

## 2021-09-09 PROCEDURE — 83036 HEMOGLOBIN GLYCOSYLATED A1C: CPT | Performed by: PHYSICIAN ASSISTANT

## 2021-09-09 PROCEDURE — 99397 PER PM REEVAL EST PAT 65+ YR: CPT | Performed by: PHYSICIAN ASSISTANT

## 2021-09-09 RX ORDER — ESTRADIOL 0.5 MG/1
TABLET ORAL
Qty: 24 TABLET | Refills: 3 | Status: SHIPPED | OUTPATIENT
Start: 2021-09-09 | End: 2022-08-11

## 2021-09-09 RX ORDER — LISINOPRIL/HYDROCHLOROTHIAZIDE 10-12.5 MG
1 TABLET ORAL DAILY
Qty: 90 TABLET | Refills: 3 | Status: SHIPPED | OUTPATIENT
Start: 2021-09-09 | End: 2022-10-31

## 2021-09-09 ASSESSMENT — ENCOUNTER SYMPTOMS
NAUSEA: 0
ARTHRALGIAS: 0
DYSURIA: 0
ABDOMINAL PAIN: 0
BREAST MASS: 0
WEAKNESS: 0
FREQUENCY: 0
FEVER: 0
HEMATURIA: 0
JOINT SWELLING: 0
HEADACHES: 0
HEARTBURN: 0
SHORTNESS OF BREATH: 0
MYALGIAS: 1
DIARRHEA: 0
SORE THROAT: 0
DIZZINESS: 0
HEMATOCHEZIA: 0
COUGH: 0
PARESTHESIAS: 0
EYE PAIN: 0
CHILLS: 0
CONSTIPATION: 0
PALPITATIONS: 0
NERVOUS/ANXIOUS: 0

## 2021-09-09 ASSESSMENT — MIFFLIN-ST. JEOR: SCORE: 1189.52

## 2021-09-09 ASSESSMENT — ACTIVITIES OF DAILY LIVING (ADL): CURRENT_FUNCTION: NO ASSISTANCE NEEDED

## 2021-09-09 NOTE — PROGRESS NOTES
"SUBJECTIVE:   Mary Art is a 75 year old female who presents for Preventive Visit.    Patient has been advised of split billing requirements and indicates understanding: Yes     Are you in the first 12 months of your Medicare coverage?  No    Healthy Habits:     In general, how would you rate your overall health?  Good    Frequency of exercise:  6-7 days/week    Duration of exercise:  Greater than 60 minutes    Do you usually eat at least 4 servings of fruit and vegetables a day, include whole grains    & fiber and avoid regularly eating high fat or \"junk\" foods?  Yes    Taking medications regularly:  Yes    Medication side effects:  None    Ability to successfully perform activities of daily living:  No assistance needed    Home Safety:  No safety concerns identified    Hearing Impairment:  Difficulty following a conversation in a noisy restaurant or crowded room, feel that people are mumbling or not speaking clearly and need to ask people to speak up or repeat themselves    In the past 6 months, have you been bothered by leaking of urine?  No    In general, how would you rate your overall mental or emotional health?  Good      PHQ-2 Total Score: 0    Additional concerns today:  No    Do you feel safe in your environment? Yes    Have you ever done Advance Care Planning? (For example, a Health Directive, POLST, or a discussion with a medical provider or your loved ones about your wishes): No, advance care planning information given to patient to review.  Patient plans to discuss their wishes with loved ones or provider.       Fall risk  Fallen 2 or more times in the past year?: No  Any fall with injury in the past year?: No    Cognitive Screening   1) Repeat 3 items (Leader, Season, Table)    2) Clock draw: NORMAL  3) 3 item recall: Recalls 2 objects   Results: NORMAL clock, 1-2 items recalled: COGNITIVE IMPAIRMENT LESS LIKELY    Mini-CogTM Copyright S [employer]. Licensed by the author for use in [facility] " Services; reprinted with permission (soob@[facility]). All rights reserved.      Reviewed and updated as needed this visit by clinical staff  Tobacco  Allergies  Meds  Problems  Med Hx  Surg Hx  Fam Hx  Soc Hx        Reviewed and updated as needed this visit by Provider  Tobacco  Allergies  Meds  Problems  Med Hx  Surg Hx  Fam Hx         Social History     Tobacco Use     Smoking status: Never Smoker     Smokeless tobacco: Never Used   Substance Use Topics     Alcohol use: No     If you drink alcohol do you typically have >3 drinks per day or >7 drinks per week? No    Alcohol Use 9/9/2021   Prescreen: >3 drinks/day or >7 drinks/week? No   Prescreen: >3 drinks/day or >7 drinks/week? -   No flowsheet data found.    Current providers sharing in care for this patient include:   Patient Care Team:  Heather Chen MD as PCP - Maurilio Martin PA-C as Assigned PCP  Bridgette Jeff MD as Assigned OBGYN Provider    The following health maintenance items are reviewed in Epic and correct as of today:  Health Maintenance Due   Topic Date Due     DIABETIC FOOT EXAM  Never done     ANNUAL REVIEW OF HM ORDERS  Never done     EYE EXAM  Never done     Pneumococcal Vaccine: 65+ Years (1 of 2 - PPSV23) Never done     HEPATITIS C SCREENING  Never done     DTAP/TDAP/TD IMMUNIZATION (1 - Tdap) Never done     ZOSTER IMMUNIZATION (1 of 2) Never done     MICROALBUMIN  05/30/2013     LIPID  09/20/2018     INFLUENZA VACCINE (1) Never done     BMP  09/08/2021     FALL RISK ASSESSMENT  09/08/2021     Labs reviewed in EPIC    Mammogram Screening - Patient over age 75, has elected to discontinue screenings.  Pertinent mammograms are reviewed under the imaging tab.    Review of Systems   Constitutional: Negative for chills and fever.   HENT: Positive for hearing loss. Negative for congestion, ear pain and sore throat.    Eyes: Negative for pain and visual disturbance.   Respiratory: Negative for cough and  "shortness of breath.    Cardiovascular: Negative for chest pain, palpitations and peripheral edema.   Gastrointestinal: Negative for abdominal pain, constipation, diarrhea, heartburn, hematochezia and nausea.   Breasts:  Negative for tenderness, breast mass and discharge.   Genitourinary: Negative for dysuria, frequency, genital sores, hematuria, pelvic pain, urgency, vaginal bleeding and vaginal discharge.   Musculoskeletal: Positive for myalgias. Negative for arthralgias and joint swelling.   Skin: Negative for rash.   Neurological: Negative for dizziness, weakness, headaches and paresthesias.   Psychiatric/Behavioral: Negative for mood changes. The patient is not nervous/anxious.      Constitutional, HEENT, cardiovascular, pulmonary, gi and gu systems are negative, except as otherwise noted.    OBJECTIVE:   /64 (BP Location: Right arm, Patient Position: Sitting, Cuff Size: Adult Regular)   Pulse 84   Temp 97.5  F (36.4  C) (Tympanic)   Resp 20   Ht 1.594 m (5' 2.75\")   Wt 73.9 kg (163 lb)   SpO2 98%   BMI 29.10 kg/m   Estimated body mass index is 29.1 kg/m  as calculated from the following:    Height as of this encounter: 1.594 m (5' 2.75\").    Weight as of this encounter: 73.9 kg (163 lb).  Physical Exam  Constitutional: healthy, alert, and no distress  Head: Normocephalic. Atraumatic  Eyes: No conjunctival injection, sclera anicteric  Neck: supple, no thyromegaly, nodules or asymmetry of the thyroid. No cervical LAD.  Cardiovascular: RRR. No murmurs, clicks, gallops, or rubs. No peripheral edema.   Respiratory: No resp distress. Lungs CTAB bilaterally.   Musculoskeletal: extremities normal- no gross deformities noted, and normal muscle tone  Skin: no suspicious lesions or rashes  Neurologic: Gait normal. CN 2-12 grossly intact  Psychiatric: mentation appears normal and affect normal/bright     Diagnostic Test Results:  Labs reviewed in Epic    ASSESSMENT / PLAN:   Encounter for Medicare annual " "wellness exam  Healthy 75 yr old here for physical exam. Last Medicare exam done 1 year ago. Discussed preventative screenings which are updated below. Counseled on healthy lifestyle. Follow-up in 1 year for repeat physical exam.     Type 2 diabetes mellitus without complication, without long-term current use of insulin (H)  Currently diet controlled. Due for routine labs. Check A1c, BMP, Lipid panel and Al/Cr ratio. Follow-up in 6 months or sooner depending on A1c results. Encouraged yearly eye exam.   - Albumin Random Urine Quantitative with Creat Ratio  - Basic metabolic panel  - Hemoglobin A1c  - Lipid panel reflex to direct LDL Fasting    Essential hypertension with goal blood pressure less than 140/90  Normotensive here. Recheck BMP. Refilled Lisinopril-hydrochlorothiazide.   - lisinopril-hydrochlorothiazide (ZESTORETIC) 10-12.5 MG tablet; Take 1 tablet by mouth daily    Vaginal prolapse  Stable. Refilled Estradiol.   - estradiol (ESTRACE) 0.5 MG tablet; Place 1 tablet in the vagina twice weekly.    Patient has been advised of split billing requirements and indicates understanding: Yes  COUNSELING:  Reviewed preventive health counseling, as reflected in patient instructions       Regular exercise       Healthy diet/nutrition       Vision screening       Dental care       Advanced Planning     Estimated body mass index is 29.1 kg/m  as calculated from the following:    Height as of this encounter: 1.594 m (5' 2.75\").    Weight as of this encounter: 73.9 kg (163 lb).    Weight management plan: Discussed healthy diet and exercise guidelines    She reports that she has never smoked. She has never used smokeless tobacco.    Appropriate preventive services were discussed with this patient, including applicable screening as appropriate for cardiovascular disease, diabetes, osteopenia/osteoporosis, and glaucoma.  As appropriate for age/gender, discussed screening for colorectal cancer, prostate cancer, breast cancer, " and cervical cancer. Checklist reviewing preventive services available has been given to the patient.    Reviewed patients plan of care and provided an AVS. The Basic Care Plan (routine screening as documented in Health Maintenance) for Mary meets the Care Plan requirement. This Care Plan has been established and reviewed with the Patient.    Maurilio Dias PA-C  [facility]

## 2021-09-09 NOTE — PATIENT INSTRUCTIONS
Preventive Health Recommendations    See your health care provider every year to    Review health changes.     Discuss preventive care.      Review your medicines if your doctor has prescribed any.      You no longer need a yearly Pap test unless you've had an abnormal Pap test in the past 10 years. If you have vaginal symptoms, such as bleeding or discharge, be sure to talk with your provider about a Pap test.      Every 1 to 2 years, have a mammogram.  If you are over 69, talk with your health care provider about whether or not you want to continue having screening mammograms.      Every 10 years, have a colonoscopy. Or, have a yearly FIT test (stool test). These exams will check for colon cancer.       Have a cholesterol test every 5 years, or more often if your doctor advises it.       Have a diabetes test (fasting glucose) every three years. If you are at risk for diabetes, you should have this test more often.       At age 65, have a bone density scan (DEXA) to check for osteoporosis (brittle bone disease).    Shots:    Get a flu shot each year.    Get a tetanus shot every 10 years.    Talk to your doctor about your pneumonia vaccines. There are now two you should receive - Pneumovax (PPSV 23) and Prevnar (PCV 13).    Talk to your pharmacist about the shingles vaccine.    Talk to your doctor about the hepatitis B vaccine.    Nutrition:     Eat at least 5 servings of fruits and vegetables each day.      Eat whole-grain bread, whole-wheat pasta and brown rice instead of white grains and rice.      Get adequate about Calcium and Vitamin D.     Lifestyle    Exercise at least 150 minutes a week (30 minutes a day, 5 days a week). This will help you control your weight and prevent disease.      Limit alcohol to one drink per day.      No smoking.       Wear sunscreen to prevent skin cancer.       See your dentist twice a year for an exam and cleaning.      See your eye doctor every 1 to 2 years to screen for  conditions such as glaucoma, macular degeneration, cataracts, etc.    Personalized Prevention Plan  You are due for the preventive services outlined below.  Your care team is available to assist you in scheduling these services.  If you have already completed any of these items, please share that information with your care team to update in your medical record.    Health Maintenance Due   Topic Date Due     Diabetic Foot Exam  Never done     ANNUAL REVIEW OF HM ORDERS  Never done     Eye Exam  Never done     Pneumococcal Vaccine (1 of 2 - PPSV23) Never done     Hepatitis C Screening  Never done     Diptheria Tetanus Pertussis (DTAP/TDAP/TD) Vaccine (1 - Tdap) Never done     Zoster (Shingles) Vaccine (1 of 2) Never done     Kidney Microalbumin Urine Test  05/30/2013     Cholesterol Lab  09/20/2018     A1C Lab  10/16/2020     Flu Vaccine (1) Never done     Basic Metabolic Panel  09/08/2021     FALL RISK ASSESSMENT  09/08/2021

## 2021-12-06 ENCOUNTER — OFFICE VISIT (OUTPATIENT)
Dept: OBGYN | Facility: CLINIC | Age: 76
End: 2021-12-06
Payer: COMMERCIAL

## 2021-12-06 VITALS
SYSTOLIC BLOOD PRESSURE: 138 MMHG | BODY MASS INDEX: 29.41 KG/M2 | RESPIRATION RATE: 18 BRPM | HEART RATE: 96 BPM | WEIGHT: 166 LBS | HEIGHT: 63 IN | DIASTOLIC BLOOD PRESSURE: 77 MMHG | TEMPERATURE: 97.8 F

## 2021-12-06 DIAGNOSIS — Z46.89 PESSARY MAINTENANCE: Primary | ICD-10-CM

## 2021-12-06 PROCEDURE — 99213 OFFICE O/P EST LOW 20 MIN: CPT | Performed by: OBSTETRICS & GYNECOLOGY

## 2021-12-06 ASSESSMENT — MIFFLIN-ST. JEOR: SCORE: 1198.13

## 2021-12-06 NOTE — PROGRESS NOTES
Mary is a 76 year old   female who presents for pessary recheck;  she currently has a ring with support pessary which she keeps in place continuously;  she reports no problems with her pessary, no discharge, no bleeding.. She is using vaginal estrogen twice a week.    All systems were reviewed and pertinent information in noted in subjective/HPI.    Past Medical History:   Diagnosis Date     HTN        Past Surgical History:   Procedure Laterality Date     HYSTERECTOMY TOTAL ABDOMINAL      Total abdominal hysterectomy & bilateral salpingo-oophorectomy     KNEE SURGERY      Knee (L)       Current Outpatient Medications   Medication Sig Dispense Refill     acetaminophen (TYLENOL) 325 MG tablet Take 325-650 mg by mouth every 6 hours as needed for mild pain       aspirin 81 MG tablet Take 1 tablet by mouth daily.        CENTRUM SILVER OR TABS ONE DAILY 90 Tab 3     CRANBERRY        estradiol (ESTRACE) 0.5 MG tablet Place 1 tablet in the vagina twice weekly. 24 tablet 3     ibuprofen (ADVIL/MOTRIN) 600 MG tablet Take 1 tablet (600 mg) by mouth every 8 hours as needed for moderate pain 90 tablet 2     lisinopril-hydrochlorothiazide (ZESTORETIC) 10-12.5 MG tablet Take 1 tablet by mouth daily 90 tablet 3     VITAMIN C 500 MG OR TABS ONE TABLET DAILY 3 MONTHS 3     VITAMIN D PO Take 1 tablet by mouth daily.         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 No   Social History Narrative     None     Social Determinants of Health     Financial Resource Strain: Not on file   Food Insecurity: Not on file   Transportation Needs: Not on file   Physical Activity: Not on file   Stress: Not on file   Social Connections: Not on file   Intimate Partner Violence: Not on file   Housing Stability: Not on file       Family History   Problem Relation Age of Onset     Hypertension Mother      Hypertension Father      Diabetes Father      Hypertension Sister      Hypertension Brother      C.A.D. No family hx of        OBJECTIVE: /77 (BP Location: Left arm, Patient Position: Chair, Cuff Size: Adult Regular)   Pulse 96   Temp 97.8  F (36.6  C) (Tympanic)   Resp 18   Ht 1.594 m (5' 2.75\")   Wt 75.3 kg (166 lb)   Breastfeeding No   BMI 29.64 kg/m    No LMP recorded. Patient has had a hysterectomy.  The pessary was removed without difficulty, cleaned in warm water and then replaced, after inspection of the vulva and vagina, which showed no erosions.    ASSESSMENT:     ICD-10-CM    1. Pessary maintenance  Z46.89          PLAN: RETURN TO CLINIC 4 months  Continue twice a week vaginal estradiol bernadette Jeff MD      "

## 2021-12-06 NOTE — NURSING NOTE
"Initial /77 (BP Location: Left arm, Patient Position: Chair, Cuff Size: Adult Regular)   Pulse 96   Temp 97.8  F (36.6  C) (Tympanic)   Resp 18   Ht 1.594 m (5' 2.75\")   Wt 75.3 kg (166 lb)   Breastfeeding No   BMI 29.64 kg/m   Estimated body mass index is 29.64 kg/m  as calculated from the following:    Height as of this encounter: 1.594 m (5' 2.75\").    Weight as of this encounter: 75.3 kg (166 lb). .      "

## 2022-03-28 ENCOUNTER — OFFICE VISIT (OUTPATIENT)
Dept: OBGYN | Facility: CLINIC | Age: 77
End: 2022-03-28
Payer: COMMERCIAL

## 2022-03-28 VITALS
RESPIRATION RATE: 16 BRPM | HEIGHT: 63 IN | DIASTOLIC BLOOD PRESSURE: 84 MMHG | WEIGHT: 159.3 LBS | HEART RATE: 84 BPM | SYSTOLIC BLOOD PRESSURE: 130 MMHG | BODY MASS INDEX: 28.23 KG/M2 | TEMPERATURE: 96.9 F

## 2022-03-28 DIAGNOSIS — Z46.89 PESSARY MAINTENANCE: Primary | ICD-10-CM

## 2022-03-28 PROCEDURE — 99213 OFFICE O/P EST LOW 20 MIN: CPT | Performed by: OBSTETRICS & GYNECOLOGY

## 2022-03-28 NOTE — PROGRESS NOTES
"Initial /84 (BP Location: Left arm, Patient Position: Chair, Cuff Size: Adult Regular)   Pulse 84   Temp 96.9  F (36.1  C) (Tympanic)   Resp 16   Ht 1.594 m (5' 2.75\")   Wt 72.3 kg (159 lb 4.8 oz)   BMI 28.44 kg/m   Estimated body mass index is 28.44 kg/m  as calculated from the following:    Height as of this encounter: 1.594 m (5' 2.75\").    Weight as of this encounter: 72.3 kg (159 lb 4.8 oz). .      "

## 2022-03-28 NOTE — PROGRESS NOTES
Mary is a 76 year old   female who presents for pessary recheck;  she currently has a ring with support pessary which she keeps in place continuously;  she reports no problems with her pessary, no discharge, no bleeding.. She is using vaginal estrogen twice a week.     All systems were reviewed and pertinent information in noted in subjective/HPI.     Past Medical History        Past Medical History:   Diagnosis Date     HTN              Past Surgical History         Past Surgical History:   Procedure Laterality Date     HYSTERECTOMY TOTAL ABDOMINAL        Total abdominal hysterectomy & bilateral salpingo-oophorectomy     KNEE SURGERY        Knee (L)            Current Outpatient Prescriptions          Current Outpatient Medications   Medication Sig Dispense Refill     acetaminophen (TYLENOL) 325 MG tablet Take 325-650 mg by mouth every 6 hours as needed for mild pain         aspirin 81 MG tablet Take 1 tablet by mouth daily.          CENTRUM SILVER OR TABS ONE DAILY 90 Tab 3     CRANBERRY           estradiol (ESTRACE) 0.5 MG tablet Place 1 tablet in the vagina twice weekly. 24 tablet 3     ibuprofen (ADVIL/MOTRIN) 600 MG tablet Take 1 tablet (600 mg) by mouth every 8 hours as needed for moderate pain 90 tablet 2     lisinopril-hydrochlorothiazide (ZESTORETIC) 10-12.5 MG tablet Take 1 tablet by mouth daily 90 tablet 3     VITAMIN C 500 MG OR TABS ONE TABLET DAILY 3 MONTHS 3     VITAMIN D PO Take 1 tablet by mouth daily.                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 No   Social History Narrative     None      Social Determinants of Health      Financial Resource Strain: Not on file   Food Insecurity: Not on file   Transportation Needs: Not on file   Physical Activity: Not on file   Stress: Not on file   Social Connections: Not on file   Intimate Partner Violence: Not on file   Housing Stability: Not on file         Family History         Family History   Problem Relation Age of Onset     Hypertension Mother       Hypertension Father       Diabetes Father       Hypertension Sister       Hypertension Brother       C.A.D. No family hx of              OBJECTIVE: /84 (BP Location: Left arm, Patient Position: Chair, Cuff Size: Adult Regular)   Pulse 84   Temp 96.9  F (36.1  C) (Tympanic)   Resp 16   Ht 1.594 m (5' 2.75\")   Wt 72.3 kg (159 lb 4.8 oz)   BMI 28.44 kg/m    The pessary was removed without difficulty, cleaned in warm water and hibclens solution and then replaced, after inspection of the vulva and vagina, which showed a mild superficial [id number] abrasion at the right vaginal cuff.      ASSESSMENT:       ICD-10-CM     1. Pessary maintenance  Z46.89              PLAN: RETURN TO CLINIC 3-4 months  Continue twice a week vaginal estradiol tabs    Rona Vargas MD  OB/GYN        "

## 2022-05-23 ENCOUNTER — TELEPHONE (OUTPATIENT)
Dept: FAMILY MEDICINE | Facility: CLINIC | Age: 77
End: 2022-05-23
Payer: COMMERCIAL

## 2022-05-23 DIAGNOSIS — Z12.31 ENCOUNTER FOR SCREENING MAMMOGRAM FOR BREAST CANCER: Primary | ICD-10-CM

## 2022-05-23 NOTE — TELEPHONE ENCOUNTER
Reason for Call: Request for an order or referral:    Order or referral being requested: Pt is scheduled for a mammogram in [place] on Wednesday 5/25. She wants to have the 3D mammogram so she called Medicare and they told her she would need a doctor order for this. Pt says she has not had a mammogram since 2017 and her daughter had a cancer spot in her breast last year and had radiation.     Date needed: as soon as possible    Phone number Patient can be reached at:  Cell number on file:    Telephone Information:   Mobile [phone]       Best Time:  anytime    Can we leave a detailed message on this number?  YES    Call taken on 5/23/2022 at 8:49 AM by Valentina Padilla

## 2022-05-25 ENCOUNTER — ANCILLARY PROCEDURE (OUTPATIENT)
Dept: MAMMOGRAPHY | Facility: CLINIC | Age: 77
End: 2022-05-25
Attending: FAMILY MEDICINE
Payer: COMMERCIAL

## 2022-05-25 DIAGNOSIS — Z12.31 VISIT FOR SCREENING MAMMOGRAM: ICD-10-CM

## 2022-05-25 PROCEDURE — 77067 SCR MAMMO BI INCL CAD: CPT | Mod: TC | Performed by: RADIOLOGY

## 2022-08-11 ENCOUNTER — OFFICE VISIT (OUTPATIENT)
Dept: OBGYN | Facility: CLINIC | Age: 77
End: 2022-08-11
Payer: COMMERCIAL

## 2022-08-11 VITALS
BODY MASS INDEX: 28.57 KG/M2 | TEMPERATURE: 98.2 F | HEART RATE: 78 BPM | WEIGHT: 160 LBS | DIASTOLIC BLOOD PRESSURE: 71 MMHG | SYSTOLIC BLOOD PRESSURE: 132 MMHG

## 2022-08-11 DIAGNOSIS — I10 ESSENTIAL HYPERTENSION WITH GOAL BLOOD PRESSURE LESS THAN 140/90: ICD-10-CM

## 2022-08-11 DIAGNOSIS — Z46.89 PESSARY MAINTENANCE: Primary | ICD-10-CM

## 2022-08-11 DIAGNOSIS — N81.10 VAGINAL PROLAPSE: ICD-10-CM

## 2022-08-11 PROCEDURE — 99213 OFFICE O/P EST LOW 20 MIN: CPT | Performed by: OBSTETRICS & GYNECOLOGY

## 2022-08-11 RX ORDER — ESTRADIOL 0.5 MG/1
TABLET ORAL
Qty: 24 TABLET | Refills: 3 | Status: SHIPPED | OUTPATIENT
Start: 2022-08-11 | End: 2023-08-04

## 2022-08-11 RX ORDER — LISINOPRIL/HYDROCHLOROTHIAZIDE 10-12.5 MG
1 TABLET ORAL DAILY
Qty: 90 TABLET | Refills: 3 | Status: CANCELLED | OUTPATIENT
Start: 2022-08-11

## 2022-08-11 NOTE — NURSING NOTE
"Initial /71 (BP Location: Left arm, Patient Position: Chair, Cuff Size: Adult Regular)   Pulse 78   Temp 98.2  F (36.8  C) (Tympanic)   Wt 72.6 kg (160 lb)   Breastfeeding No   BMI 28.57 kg/m   Estimated body mass index is 28.57 kg/m  as calculated from the following:    Height as of 3/28/22: 1.594 m (5' 2.75\").    Weight as of this encounter: 72.6 kg (160 lb). .    Irene Barney MA    "

## 2022-08-11 NOTE — PROGRESS NOTES
Mary is a 76 year old   female who presents for pessary recheck;  she currently has a ring with support pessary which she keeps in place continuously;  she reports no problems with her pessary, no discharge, no bleeding.. She is using vaginal estrogen twice a week. No worsening urinary symptoms or bowel symptoms.     All systems were reviewed and pertinent information in noted in subjective/HPI.     Past Medical History           Past Medical History:   Diagnosis Date     HTN              Past Surgical History             Past Surgical History:   Procedure Laterality Date     HYSTERECTOMY TOTAL ABDOMINAL        Total abdominal hysterectomy & bilateral salpingo-oophorectomy     KNEE SURGERY        Knee (L)            Current Outpatient Prescriptions               Current Outpatient Medications   Medication Sig Dispense Refill     acetaminophen (TYLENOL) 325 MG tablet Take 325-650 mg by mouth every 6 hours as needed for mild pain         aspirin 81 MG tablet Take 1 tablet by mouth daily.          CENTRUM SILVER OR TABS ONE DAILY 90 Tab 3     CRANBERRY           estradiol (ESTRACE) 0.5 MG tablet Place 1 tablet in the vagina twice weekly. 24 tablet 3     ibuprofen (ADVIL/MOTRIN) 600 MG tablet Take 1 tablet (600 mg) by mouth every 8 hours as needed for moderate pain 90 tablet 2     lisinopril-hydrochlorothiazide (ZESTORETIC) 10-12.5 MG tablet Take 1 tablet by mouth daily 90 tablet 3     VITAMIN C 500 MG OR TABS ONE TABLET DAILY 3 MONTHS 3     VITAMIN D PO Take 1 tablet by mouth daily.                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 No   Social History Narrative     None      Social Determinants of Health      Financial Resource Strain: Not on file   Food Insecurity: Not on file   Transportation Needs: Not on file   Physical Activity: Not on file   Stress: Not on file   Social Connections: Not on file   Intimate Partner Violence: Not on file   Housing Stability: Not on file         Family History             Family History   Problem Relation Age of Onset     Hypertension Mother       Hypertension Father       Diabetes Father       Hypertension Sister       Hypertension Brother       C.A.D. No family hx of              OBJECTIVE: Blood pressure 132/71, pulse 78, temperature 98.2  F (36.8  C), temperature source Tympanic, weight 72.6 kg (160 lb), not currently breastfeeding.   s    The pessary was removed without difficulty, cleaned in warm water and hibclens solution and then replaced, after inspection of the vulva and vagina, no lesions noted on vaginal exam     ASSESSMENT:       ICD-10-CM     1. Pessary maintenance  Z46.89            PLAN: RETURN TO CLINIC 3-4 months  Continue twice a week vaginal estradiol bernadette Grove MD   8/11/2022 3:00 PM

## 2022-10-31 ENCOUNTER — OFFICE VISIT (OUTPATIENT)
Dept: FAMILY MEDICINE | Facility: CLINIC | Age: 77
End: 2022-10-31
Payer: COMMERCIAL

## 2022-10-31 VITALS
BODY MASS INDEX: 29.37 KG/M2 | WEIGHT: 159.6 LBS | HEIGHT: 62 IN | TEMPERATURE: 98.5 F | SYSTOLIC BLOOD PRESSURE: 120 MMHG | DIASTOLIC BLOOD PRESSURE: 70 MMHG | RESPIRATION RATE: 20 BRPM | HEART RATE: 72 BPM

## 2022-10-31 DIAGNOSIS — I10 ESSENTIAL HYPERTENSION WITH GOAL BLOOD PRESSURE LESS THAN 140/90: ICD-10-CM

## 2022-10-31 DIAGNOSIS — E11.9 TYPE 2 DIABETES MELLITUS WITHOUT COMPLICATION, WITHOUT LONG-TERM CURRENT USE OF INSULIN (H): ICD-10-CM

## 2022-10-31 DIAGNOSIS — Z00.00 ENCOUNTER FOR MEDICARE ANNUAL WELLNESS EXAM: Primary | ICD-10-CM

## 2022-10-31 LAB
ANION GAP SERPL CALCULATED.3IONS-SCNC: 10 MMOL/L (ref 7–15)
BUN SERPL-MCNC: 24.8 MG/DL (ref 8–23)
CALCIUM SERPL-MCNC: 9.6 MG/DL (ref 8.8–10.2)
CHLORIDE SERPL-SCNC: 103 MMOL/L (ref 98–107)
CHOLEST SERPL-MCNC: 164 MG/DL
CREAT SERPL-MCNC: 0.72 MG/DL (ref 0.51–0.95)
CREAT UR-MCNC: 79.8 MG/DL
DEPRECATED HCO3 PLAS-SCNC: 28 MMOL/L (ref 22–29)
GFR SERPL CREATININE-BSD FRML MDRD: 85 ML/MIN/1.73M2
GLUCOSE SERPL-MCNC: 142 MG/DL (ref 70–99)
HBA1C MFR BLD: 6.6 % (ref 0–5.6)
HDLC SERPL-MCNC: 60 MG/DL
LDLC SERPL CALC-MCNC: 86 MG/DL
MICROALBUMIN UR-MCNC: 13.5 MG/L
MICROALBUMIN/CREAT UR: 16.92 MG/G CR (ref 0–25)
NONHDLC SERPL-MCNC: 104 MG/DL
POTASSIUM SERPL-SCNC: 4.1 MMOL/L (ref 3.4–5.3)
SODIUM SERPL-SCNC: 141 MMOL/L (ref 136–145)
TRIGL SERPL-MCNC: 91 MG/DL

## 2022-10-31 PROCEDURE — 80061 LIPID PANEL: CPT | Performed by: PHYSICIAN ASSISTANT

## 2022-10-31 PROCEDURE — 99214 OFFICE O/P EST MOD 30 MIN: CPT | Mod: 25 | Performed by: PHYSICIAN ASSISTANT

## 2022-10-31 PROCEDURE — 80048 BASIC METABOLIC PNL TOTAL CA: CPT | Performed by: PHYSICIAN ASSISTANT

## 2022-10-31 PROCEDURE — 82043 UR ALBUMIN QUANTITATIVE: CPT | Performed by: PHYSICIAN ASSISTANT

## 2022-10-31 PROCEDURE — G0439 PPPS, SUBSEQ VISIT: HCPCS | Performed by: PHYSICIAN ASSISTANT

## 2022-10-31 PROCEDURE — 83036 HEMOGLOBIN GLYCOSYLATED A1C: CPT | Performed by: PHYSICIAN ASSISTANT

## 2022-10-31 PROCEDURE — 36415 COLL VENOUS BLD VENIPUNCTURE: CPT | Performed by: PHYSICIAN ASSISTANT

## 2022-10-31 RX ORDER — LISINOPRIL/HYDROCHLOROTHIAZIDE 10-12.5 MG
1 TABLET ORAL DAILY
Qty: 90 TABLET | Refills: 3 | Status: SHIPPED | OUTPATIENT
Start: 2022-10-31 | End: 2023-11-08

## 2022-10-31 ASSESSMENT — ENCOUNTER SYMPTOMS
SORE THROAT: 0
ABDOMINAL PAIN: 0
WEAKNESS: 0
NAUSEA: 0
HEARTBURN: 0
BREAST MASS: 0
PALPITATIONS: 0
SHORTNESS OF BREATH: 0
CONSTIPATION: 0
DIZZINESS: 0
FREQUENCY: 0
COUGH: 0
DIARRHEA: 0
FEVER: 0
DYSURIA: 0
PARESTHESIAS: 0
NERVOUS/ANXIOUS: 0
ARTHRALGIAS: 1
JOINT SWELLING: 0
CHILLS: 0
EYE PAIN: 0
HEMATOCHEZIA: 0
HEADACHES: 0
MYALGIAS: 0
HEMATURIA: 0

## 2022-10-31 ASSESSMENT — PAIN SCALES - GENERAL: PAINLEVEL: NO PAIN (0)

## 2022-10-31 ASSESSMENT — ACTIVITIES OF DAILY LIVING (ADL): CURRENT_FUNCTION: NO ASSISTANCE NEEDED

## 2022-10-31 NOTE — PROGRESS NOTES
"SUBJECTIVE:   Mary is a 76 year old who presents for Preventive Visit.    Patient has been advised of split billing requirements and indicates understanding: Yes     Are you in the first 12 months of your Medicare coverage?  No    Healthy Habits:     In general, how would you rate your overall health?  Fair    Frequency of exercise:  6-7 days/week    Duration of exercise:  15-30 minutes    Do you usually eat at least 4 servings of fruit and vegetables a day, include whole grains    & fiber and avoid regularly eating high fat or \"junk\" foods?  Yes    Taking medications regularly:  Yes    Medication side effects:  None    Ability to successfully perform activities of daily living:  No assistance needed    Home Safety:  No safety concerns identified    Hearing Impairment:  Difficulty following a conversation in a noisy restaurant or crowded room and feel that people are mumbling or not speaking clearly    In the past 6 months, have you been bothered by leaking of urine?  No    In general, how would you rate your overall mental or emotional health?  Good      PHQ-2 Total Score: 0    Additional concerns today:  No    Do you feel safe in your environment? Yes    Have you ever done Advance Care Planning? (For example, a Health Directive, POLST, or a discussion with a medical provider or your loved ones about your wishes): No, advance care planning information given to patient to review.  Patient plans to discuss their wishes with loved ones or provider.      Fall risk  Fallen 2 or more times in the past year?: No  Any fall with injury in the past year?: No    Cognitive Screening   1) Repeat 3 items (Leader, Season, Table)    2) Clock draw: NORMAL  3) 3 item recall: Recalls 3 objects  Results: 3 items recalled: COGNITIVE IMPAIRMENT LESS LIKELY    Mini-CogTM Copyright S Hum[employer]. Licensed by the author for use in [facility]; reprinted with permission (rishabh@.[email or web address]). All rights reserved.      Do you have sleep " apnea, excessive snoring or daytime drowsiness?: yes    Reviewed and updated as needed this visit by clinical staff   Tobacco  Allergies  Meds  Problems  Med Hx  Surg Hx  Fam Hx  Soc   Hx        Reviewed and updated as needed this visit by Provider   Tobacco  Allergies  Meds  Problems  Med Hx  Surg Hx  Fam Hx         Social History     Tobacco Use     Smoking status: Never     Smokeless tobacco: Never   Substance Use Topics     Alcohol use: No     If you drink alcohol do you typically have >3 drinks per day or >7 drinks per week? No    Alcohol Use 10/31/2022   Prescreen: >3 drinks/day or >7 drinks/week? Not Applicable   Prescreen: >3 drinks/day or >7 drinks/week? -   No flowsheet data found.    Current providers sharing in care for this patient include:   Patient Care Team:  Heather Chen MD as PCP - Maurilio Martin PA-C as Assigned PCP  Jazlyn Grove MD as Assigned OBGYN Provider    The following health maintenance items are reviewed in Epic and correct as of today:  Health Maintenance   Topic Date Due     DIABETIC FOOT EXAM  Never done     ANNUAL REVIEW OF HM ORDERS  Never done     EYE EXAM  Never done     Pneumococcal Vaccine: 65+ Years (1 - PCV) Never done     HEPATITIS C SCREENING  Never done     DTAP/TDAP/TD IMMUNIZATION (1 - Tdap) Never done     ZOSTER IMMUNIZATION (1 of 2) Never done     COVID-19 Vaccine (4 - Booster for Moderna series) 01/31/2022     INFLUENZA VACCINE (1) 09/01/2022     A1C  01/31/2023     MEDICARE ANNUAL WELLNESS VISIT  10/31/2023     BMP  10/31/2023     LIPID  10/31/2023     MICROALBUMIN  10/31/2023     FALL RISK ASSESSMENT  10/31/2023     ADVANCE CARE PLANNING  10/31/2027     DEXA  11/22/2031     PHQ-2 (once per calendar year)  Completed     IPV IMMUNIZATION  Aged Out     MENINGITIS IMMUNIZATION  Aged Out     Lab work is in process  Labs reviewed in EPIC    Pertinent mammograms are reviewed under the imaging tab.    Review of Systems  "  Constitutional: Negative for chills and fever.   HENT: Positive for hearing loss. Negative for congestion, ear pain and sore throat.    Eyes: Negative for pain and visual disturbance.   Respiratory: Negative for cough and shortness of breath.    Cardiovascular: Positive for peripheral edema. Negative for chest pain and palpitations.   Gastrointestinal: Negative for abdominal pain, constipation, diarrhea, heartburn, hematochezia and nausea.   Breasts:  Negative for tenderness, breast mass and discharge.   Genitourinary: Negative for dysuria, frequency, genital sores, hematuria, pelvic pain, urgency, vaginal bleeding and vaginal discharge.   Musculoskeletal: Positive for arthralgias. Negative for joint swelling and myalgias.   Skin: Negative for rash.   Neurological: Negative for dizziness, weakness, headaches and paresthesias.   Psychiatric/Behavioral: Negative for mood changes. The patient is not nervous/anxious.      Constitutional, HEENT, cardiovascular, pulmonary, gi and gu systems are negative, except as otherwise noted.    OBJECTIVE:   /70 (BP Location: Right arm, Patient Position: Sitting, Cuff Size: Adult Regular)   Pulse 72   Temp 98.5  F (36.9  C) (Tympanic)   Resp 20   Ht 1.581 m (5' 2.25\")   Wt 72.4 kg (159 lb 9.6 oz)   BMI 28.96 kg/m   Estimated body mass index is 28.96 kg/m  as calculated from the following:    Height as of this encounter: 1.581 m (5' 2.25\").    Weight as of this encounter: 72.4 kg (159 lb 9.6 oz).  Physical Exam  GENERAL APPEARANCE: healthy, alert and no distress  EYES: Eyes grossly normal to inspection, PERRL and conjunctivae and sclerae normal  HENT: ear canals and TM's normal, nose and mouth without ulcers or lesions, oropharynx clear and oral mucous membranes moist  NECK: no adenopathy, no asymmetry, masses, or scars and thyroid normal to palpation  RESP: lungs clear to auscultation - no rales, rhonchi or wheezes  CV: regular rate and rhythm, normal S1 S2, no S3 or " S4, no murmur, click or rub. 2+ pitting edema to bilateral lower extremities  ABDOMEN: soft, nontender, no hepatosplenomegaly, no masses and bowel sounds normal  MS: no musculoskeletal defects are noted and gait is age appropriate without ataxia  SKIN: no suspicious lesions or rashes  NEURO: Normal strength and tone, sensory exam grossly normal, mentation intact and speech normal  PSYCH: mentation appears normal and affect normal/bright    Diagnostic Test Results:  Labs reviewed in Epic    ASSESSMENT / PLAN:   (Z00.00) Encounter for Medicare annual wellness exam  (primary encounter diagnosis)  Comment: Pt presents for routine preventative physical. Last preventative one year ago. No specific health concerns today. We discussed screening for hyperlipidemia and checking A1c for pre-diabetes. Declined updating vaccinations. Declined colonoscopy screening at this time - never done. Discussed and encouraged diet improvement and physical activity  Plan: See back in one year for repeat preventative vitis    (E11.9) Type 2 diabetes mellitus without complication, without long-term current use of insulin (H)  Comment: Last A1c 6.4 one year ago. Currently diet-controlled. Informed pt that a medication such as Metformin may be needed in the future if A1c is increased despite diet control.  Plan: HEMOGLOBIN A1C, Lipid panel reflex to direct         LDL Non-fasting, Albumin Random Urine         Quantitative with Creat Ratio          (I10) Essential hypertension with goal blood pressure less than 140/90  Comment: BP stable and well controlled. 120/70 today. Continue Zestoretic.  Plan: BASIC METABOLIC PANEL,         lisinopril-hydrochlorothiazide (ZESTORETIC)         10-12.5 MG tablet          Patient has been advised of split billing requirements and indicates understanding: Yes    COUNSELING:  Reviewed preventive health counseling, as reflected in patient instructions  Special attention given to:       Regular exercise        "Healthy diet/nutrition       Vision screening       Hearing screening       Colon cancer screening    Estimated body mass index is 28.96 kg/m  as calculated from the following:    Height as of this encounter: 1.581 m (5' 2.25\").    Weight as of this encounter: 72.4 kg (159 lb 9.6 oz).    Weight management plan: Discussed healthy diet and exercise guidelines    She reports that she has never smoked. She has never used smokeless tobacco.    Appropriate preventive services were discussed with this patient, including applicable screening as appropriate for cardiovascular disease, diabetes, osteopenia/osteoporosis, and glaucoma.  As appropriate for age/gender, discussed screening for colorectal cancer, prostate cancer, breast cancer, and cervical cancer. Checklist reviewing preventive services available has been given to the patient.    Reviewed patients plan of care and provided an AVS. The Basic Care Plan (routine screening as documented in Health Maintenance) for Mary meets the Care Plan requirement. This Care Plan has been established and reviewed with the Patient.    Maurilio Dias PA-C  [facility]    Identified Health Risks:  "

## 2022-10-31 NOTE — LETTER
November 2, 2022      Mary Art  [address]  [facility] [address]        Dear ,    We are writing to inform you of your test results.  Lab work looks great. Diabetes, cholesterol, kidney function and electrolytes are all stable.     Recheck in 6 months.         Resulted Orders   HEMOGLOBIN A1C   Result Value Ref Range    Hemoglobin A1C 6.6 (H) 0.0 - 5.6 %      Comment:      Normal <5.7%   Prediabetes 5.7-6.4%    Diabetes 6.5% or higher     Note: Adopted from ADA consensus guidelines.   BASIC METABOLIC PANEL   Result Value Ref Range    Sodium 141 136 - 145 mmol/L    Potassium 4.1 3.4 - 5.3 mmol/L    Chloride 103 98 - 107 mmol/L    Carbon Dioxide (CO2) 28 22 - 29 mmol/L    Anion Gap 10 7 - 15 mmol/L    Urea Nitrogen 24.8 (H) 8.0 - 23.0 mg/dL    Creatinine 0.72 0.51 - 0.95 mg/dL    Calcium 9.6 8.8 - 10.2 mg/dL    Glucose 142 (H) 70 - 99 mg/dL    GFR Estimate 85 >60 mL/min/1.73m2      Comment:      Effective December 21, 2021 eGFRcr in adults is calculated using the 2021 CKD-EPI creatinine equation which includes age and gender (Orville et al., NEJ, DOI: 10.1056/MPMJyr5888953)   Lipid panel reflex to direct LDL Non-fasting   Result Value Ref Range    Cholesterol 164 <200 mg/dL    Triglycerides 91 <150 mg/dL    Direct Measure HDL 60 >=50 mg/dL    LDL Cholesterol Calculated 86 <=100 mg/dL    Non HDL Cholesterol 104 <130 mg/dL    Narrative    Cholesterol  Desirable:  <200 mg/dL    Triglycerides  Normal:  Less than 150 mg/dL  Borderline High:  150-199 mg/dL  High:  200-499 mg/dL  Very High:  Greater than or equal to 500 mg/dL    Direct Measure HDL  Female:  Greater than or equal to 50 mg/dL   Male:  Greater than or equal to 40 mg/dL    LDL Cholesterol  Desirable:  <100mg/dL  Above Desirable:  100-129 mg/dL   Borderline High:  130-159 mg/dL   High:  160-189 mg/dL   Very High:  >= 190 mg/dL    Non HDL Cholesterol  Desirable:  130 mg/dL  Above Desirable:  130-159 mg/dL  Borderline High:  160-189 mg/dL  High:   190-219 mg/dL  Very High:  Greater than or equal to 220 mg/dL   Albumin Random Urine Quantitative with Creat Ratio   Result Value Ref Range    Albumin Urine mg/L 13.5 mg/L      Comment:      The reference ranges have not been established in urine albumin. The results should be integrated into the clinical context for interpretation.    Albumin Urine mg/g Cr 16.92 0.00 - 25.00 mg/g Cr      Comment:      Microalbuminuria is defined as an albumin:creatinine ratio of 17 to 299 for males and 25 to 299 for females. A ratio of albumin:creatinine of 300 or higher is indicative of overt proteinuria.  Due to biologic variability, positive results should be confirmed by a second, first-morning random or 24-hour timed urine specimen. If there is discrepancy, a third specimen is recommended. When 2 out of 3 results are in the microalbuminuria range, this is evidence for incipient nephropathy and warrants increased efforts at glucose control, blood pressure control, and institution of therapy with an angiotensin-converting-enzyme (ACE) inhibitor (if the patient can tolerate it).      Creatinine Urine mg/dL 79.8 mg/dL      Comment:      The reference ranges have not been established in urine creatinine. The results should be integrated into the clinical context for interpretation.       If you have any questions or concerns, please call the clinic at the number listed above.       Sincerely,      Maurilio Dias PA-C/sandie

## 2022-10-31 NOTE — PATIENT INSTRUCTIONS
Continue current medication regimen.    Patient Education   Personalized Prevention Plan  You are due for the preventive services outlined below.  Your care team is available to assist you in scheduling these services.  If you have already completed any of these items, please share that information with your care team to update in your medical record.  Health Maintenance Due   Topic Date Due    Diabetic Foot Exam  Never done    ANNUAL REVIEW OF HM ORDERS  Never done    Eye Exam  Never done    Pneumococcal Vaccine (1 - PCV) Never done    Hepatitis C Screening  Never done    Diptheria Tetanus Pertussis (DTAP/TDAP/TD) Vaccine (1 - Tdap) Never done    Zoster (Shingles) Vaccine (1 of 2) Never done    A1C Lab  12/09/2021    COVID-19 Vaccine (4 - Booster for Moderna series) 01/31/2022    Flu Vaccine (1) 09/01/2022    Annual Wellness Visit  09/09/2022    Basic Metabolic Panel  09/09/2022    Cholesterol Lab  09/09/2022    Kidney Microalbumin Urine Test  09/09/2022     Your Health Risk Assessment indicates you feel you are not in good health    A healthy lifestyle helps keep the body fit and the mind alert. It helps protect you from disease, helps you fight disease, and helps prevent chronic disease (disease that doesn't go away) from getting worse. This is important as you get older and begin to notice twinges in muscles and joints and a decline in the strength and stamina you once took for granted. A healthy lifestyle includes good healthcare, good nutrition, weight control, recreation, and regular exercise. Avoid harmful substances and do what you can to keep safe. Another part of a healthy lifestyle is stay mentally active and socially involved.    Good healthcare   Have a wellness visit every year.   If you have new symptoms, let us know right away. Don't wait until the next checkup.   Take medicines exactly as prescribed and keep your medicines in a safe place. Tell us if your medicine causes problems.   Healthy diet  and weight control   Eat 3 or 4 small, nutritious, low-fat, high-fiber meals a day. Include a variety of fruits, vegetables, and whole-grain foods.   Make sure you get enough calcium in your diet. Calcium, vitamin D, and exercise help prevent osteoporosis (bone thinning).   If you live alone, try eating with others when you can. That way you get a good meal and have company while you eat it.   Try to keep a healthy weight. If you eat more calories than your body uses for energy, it will be stored as fat and you will gain weight.     Recreation   Recreation is not limited to sports and team events. It includes any activity that provides relaxation, interest, enjoyment, and exercise. Recreation provides an outlet for physical, mental, and social energy. It can give a sense of worth and achievement. It can help you stay healthy.    Mental Exercise and Social Involvement  Mental and emotional health is as important as physical health. Keep in touch with friends and family. Stay as active as possible. Continue to learn and challenge yourself.   Things you can do to stay mentally active are:  Learn something new, like a foreign language or musical instrument.   Play SCRABBLE or do crossword puzzles. If you cannot find people to play these games with you at home, you can play them with others on your computer through the Internet.   Join a games club--anything from card games to chess or checkers or lawn bowling.   Start a new hobby.   Go back to school.   Volunteer.   Read.   Keep up with world events.    Signs of Hearing Loss      Hearing much better with one ear can be a sign of hearing loss.   Hearing loss is a problem shared by many people. In fact, it is one of the most common health problems, particularly as people age. Most people age 65 and older have some hearing loss. By age 80, almost everyone does. Hearing loss often occurs slowly over the years. So you may not realize your hearing has gotten worse.  Have your  hearing checked  Call your healthcare provider if you:  Have to strain to hear normal conversation  Have to watch other people s faces very carefully to follow what they re saying  Need to ask people to repeat what they ve said  Often misunderstand what people are saying  Turn the volume of the television or radio up so high that others complain  Feel that people are mumbling when they re talking to you  Find that the effort to hear leaves you feeling tired and irritated  Notice, when using the phone, that you hear better with one ear than the other  [employer] last reviewed this educational content on 1/1/2020 2000-2021 [employer]. All rights reserved. This information is not intended as a substitute for professional medical care. Always follow your healthcare professional's instructions.

## 2022-12-12 ENCOUNTER — OFFICE VISIT (OUTPATIENT)
Dept: OBGYN | Facility: CLINIC | Age: 77
End: 2022-12-12
Payer: COMMERCIAL

## 2022-12-12 VITALS
HEART RATE: 114 BPM | HEIGHT: 63 IN | DIASTOLIC BLOOD PRESSURE: 74 MMHG | WEIGHT: 160.6 LBS | BODY MASS INDEX: 28.46 KG/M2 | RESPIRATION RATE: 20 BRPM | SYSTOLIC BLOOD PRESSURE: 133 MMHG

## 2022-12-12 DIAGNOSIS — Z46.89 PESSARY MAINTENANCE: Primary | ICD-10-CM

## 2022-12-12 PROCEDURE — 99213 OFFICE O/P EST LOW 20 MIN: CPT | Performed by: OBSTETRICS & GYNECOLOGY

## 2022-12-12 NOTE — NURSING NOTE
"Initial /74 (BP Location: Right arm, Patient Position: Chair, Cuff Size: Adult Regular)   Pulse 114   Resp 20   Ht 1.594 m (5' 2.75\")   Wt 72.8 kg (160 lb 9.6 oz)   BMI 28.68 kg/m   Estimated body mass index is 28.68 kg/m  as calculated from the following:    Height as of this encounter: 1.594 m (5' 2.75\").    Weight as of this encounter: 72.8 kg (160 lb 9.6 oz). .    Griselda Mansfield, LUIS    "

## 2022-12-12 NOTE — PROGRESS NOTES
Mary is a 76 year old   female who presents for pessary recheck;  she currently has a ring with support pessary which she keeps in place continuously;  she reports no problems with her pessary, no discharge, no bleeding. She is using vaginal estrogen twice a week. No worsening urinary symptoms or bowel symptoms.     All systems were reviewed and pertinent information in noted in subjective/HPI.     Past Medical History           Past Medical History:   Diagnosis Date     HTN              Past Surgical History             Past Surgical History:   Procedure Laterality Date     HYSTERECTOMY TOTAL ABDOMINAL        Total abdominal hysterectomy & bilateral salpingo-oophorectomy     KNEE SURGERY        Knee (L)            Current Outpatient Prescriptions               Current Outpatient Medications   Medication Sig Dispense Refill     acetaminophen (TYLENOL) 325 MG tablet Take 325-650 mg by mouth every 6 hours as needed for mild pain         aspirin 81 MG tablet Take 1 tablet by mouth daily.          CENTRUM SILVER OR TABS ONE DAILY 90 Tab 3     CRANBERRY           estradiol (ESTRACE) 0.5 MG tablet Place 1 tablet in the vagina twice weekly. 24 tablet 3     ibuprofen (ADVIL/MOTRIN) 600 MG tablet Take 1 tablet (600 mg) by mouth every 8 hours as needed for moderate pain 90 tablet 2     lisinopril-hydrochlorothiazide (ZESTORETIC) 10-12.5 MG tablet Take 1 tablet by mouth daily 90 tablet 3     VITAMIN C 500 MG OR TABS ONE TABLET DAILY 3 MONTHS 3     VITAMIN D PO Take 1 tablet by mouth daily.                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 No   Social History Narrative     None      Social Determinants of Health      Financial Resource Strain: Not on file   Food Insecurity: Not on file   Transportation Needs: Not on file   Physical Activity: Not on file   Stress: Not on file   Social Connections: Not on file   Intimate Partner Violence: Not on file   Housing Stability: Not on file         Family History             Family History   Problem Relation Age of Onset     Hypertension Mother       Hypertension Father       Diabetes Father       Hypertension Sister       Hypertension Brother       C.A.D. No family hx of              OBJECTIVE: /74 (BP Location: Right arm, Patient Position: Chair, Cuff Size: Adult Regular)   Pulse 114   Resp 20   Ht 1.594 m (5' 2.75\")   Wt 72.8 kg (160 lb 9.6 oz)   BMI 28.68 kg/m       The pessary was removed without difficulty, cleaned in warm water and hibclens solution and then replaced, after inspection of the vulva and vagina, no lesions noted on vaginal exam     ASSESSMENT:       ICD-10-CM     1. Pessary maintenance  Z46.89            PLAN: RETURN TO CLINIC 3-4 months  Continue twice a week vaginal estradiol bernadette Vargas MD  OB/GYN    "

## 2023-03-24 ENCOUNTER — OFFICE VISIT (OUTPATIENT)
Dept: OBGYN | Facility: CLINIC | Age: 78
End: 2023-03-24
Payer: COMMERCIAL

## 2023-03-24 VITALS
DIASTOLIC BLOOD PRESSURE: 77 MMHG | WEIGHT: 160.8 LBS | SYSTOLIC BLOOD PRESSURE: 130 MMHG | TEMPERATURE: 97.8 F | BODY MASS INDEX: 28.49 KG/M2 | HEIGHT: 63 IN | HEART RATE: 78 BPM | RESPIRATION RATE: 16 BRPM

## 2023-03-24 DIAGNOSIS — Z46.89 PESSARY MAINTENANCE: Primary | ICD-10-CM

## 2023-03-24 PROCEDURE — 99213 OFFICE O/P EST LOW 20 MIN: CPT | Performed by: OBSTETRICS & GYNECOLOGY

## 2023-03-24 NOTE — PROGRESS NOTES
OB/GYN Clinic     Mary is a 77 year old   female who presents for pessary recheck;  she currently has a ring with support pessary which she keeps in place continuously;  she reports no problems with her pessary, no discharge, no bleeding. She is using vaginal estrogen twice a week. No worsening urinary symptoms or bowel symptoms.     All systems were reviewed and pertinent information in noted in subjective/HPI.     Past Medical History           Past Medical History:   Diagnosis Date     HTN              Past Surgical History             Past Surgical History:   Procedure Laterality Date     HYSTERECTOMY TOTAL ABDOMINAL        Total abdominal hysterectomy & bilateral salpingo-oophorectomy     KNEE SURGERY        Knee (L)            Current Outpatient Prescriptions               Current Outpatient Medications   Medication Sig Dispense Refill     acetaminophen (TYLENOL) 325 MG tablet Take 325-650 mg by mouth every 6 hours as needed for mild pain         aspirin 81 MG tablet Take 1 tablet by mouth daily.          CENTRUM SILVER OR TABS ONE DAILY 90 Tab 3     CRANBERRY           estradiol (ESTRACE) 0.5 MG tablet Place 1 tablet in the vagina twice weekly. 24 tablet 3     ibuprofen (ADVIL/MOTRIN) 600 MG tablet Take 1 tablet (600 mg) by mouth every 8 hours as needed for moderate pain 90 tablet 2     lisinopril-hydrochlorothiazide (ZESTORETIC) 10-12.5 MG tablet Take 1 tablet by mouth daily 90 tablet 3     VITAMIN C 500 MG OR TABS ONE TABLET DAILY 3 MONTHS 3     VITAMIN D PO Take 1 tablet by mouth daily.                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 No   Social History Narrative     None      Social Determinants of Health      Financial Resource Strain: Not on file   Food Insecurity: Not on file   Transportation Needs: Not on file   Physical Activity: Not on file   Stress: Not on file   Social Connections: Not on file   Intimate Partner Violence: Not on file   Housing Stability: Not on file         Family History             Family History   Problem Relation Age of Onset     Hypertension Mother       Hypertension Father       Diabetes Father       Hypertension Sister       Hypertension Brother       C.A.D. No family hx of              OBJECTIVE: /77 (BP Location: Right arm, Patient Position: Chair, Cuff Size: Adult Regular)   Pulse 78   Temp 97.8  F (36.6  C) (Tympanic)   Resp 16   Ht 1.594 m (5' 2.75\")   Wt 72.9 kg (160 lb 12.8 oz)   BMI 28.71 kg/m       The pessary was removed without difficulty, cleaned in warm water and hibclens solution and then replaced, after inspection of the vulva and vagina, no lesions noted on vaginal exam     ASSESSMENT:       ICD-10-CM     1. Pessary maintenance  Z46.89            PLAN: RETURN TO CLINIC 3-4 months  Continue twice a week vaginal estradiol tabs     Rona Vargas MD  OB/GYN    "

## 2023-03-24 NOTE — PROGRESS NOTES
"Initial /77 (BP Location: Right arm, Patient Position: Chair, Cuff Size: Adult Regular)   Pulse 78   Temp 97.8  F (36.6  C) (Tympanic)   Resp 16   Ht 1.594 m (5' 2.75\")   Wt 72.9 kg (160 lb 12.8 oz)   BMI 28.71 kg/m   Estimated body mass index is 28.71 kg/m  as calculated from the following:    Height as of this encounter: 1.594 m (5' 2.75\").    Weight as of this encounter: 72.9 kg (160 lb 12.8 oz). .      "

## 2023-07-10 ENCOUNTER — OFFICE VISIT (OUTPATIENT)
Dept: FAMILY MEDICINE | Facility: CLINIC | Age: 78
End: 2023-07-10
Payer: COMMERCIAL

## 2023-07-10 VITALS
RESPIRATION RATE: 20 BRPM | HEART RATE: 95 BPM | SYSTOLIC BLOOD PRESSURE: 128 MMHG | TEMPERATURE: 97.6 F | DIASTOLIC BLOOD PRESSURE: 70 MMHG | WEIGHT: 162 LBS | BODY MASS INDEX: 28.7 KG/M2 | OXYGEN SATURATION: 97 % | HEIGHT: 63 IN

## 2023-07-10 DIAGNOSIS — H61.23 BILATERAL IMPACTED CERUMEN: Primary | ICD-10-CM

## 2023-07-10 DIAGNOSIS — E11.9 TYPE 2 DIABETES MELLITUS WITHOUT COMPLICATION, WITHOUT LONG-TERM CURRENT USE OF INSULIN (H): ICD-10-CM

## 2023-07-10 DIAGNOSIS — I10 ESSENTIAL HYPERTENSION WITH GOAL BLOOD PRESSURE LESS THAN 140/90: ICD-10-CM

## 2023-07-10 LAB — HBA1C MFR BLD: 6.6 % (ref 0–5.6)

## 2023-07-10 PROCEDURE — 99214 OFFICE O/P EST MOD 30 MIN: CPT | Mod: 25 | Performed by: FAMILY MEDICINE

## 2023-07-10 PROCEDURE — 36415 COLL VENOUS BLD VENIPUNCTURE: CPT | Performed by: FAMILY MEDICINE

## 2023-07-10 PROCEDURE — 83036 HEMOGLOBIN GLYCOSYLATED A1C: CPT | Performed by: FAMILY MEDICINE

## 2023-07-10 PROCEDURE — 69209 REMOVE IMPACTED EAR WAX UNI: CPT | Mod: 50 | Performed by: FAMILY MEDICINE

## 2023-07-10 ASSESSMENT — PAIN SCALES - GENERAL: PAINLEVEL: NO PAIN (0)

## 2023-07-10 NOTE — PROGRESS NOTES
"  Assessment & Plan     Bilateral impacted cerumen  Ears lavaged successfully    Type 2 diabetes mellitus without complication, without long-term current use of insulin (H)  Diet controlled  - HEMOGLOBIN A1C; Future  - HEMOGLOBIN A1C    Essential hypertension with goal blood pressure less than 140/90  Well controlled  Continue lisinopril hydrochlorothiazide     Patient Instructions   Come back for wellness visit in Oct                  BMI:   Estimated body mass index is 29.16 kg/m  as calculated from the following:    Height as of this encounter: 1.588 m (5' 2.5\").    Weight as of this encounter: 73.5 kg (162 lb).           Heather Michel MD  [facility]    Criss Hernandez is a 77 year old, presenting for the following health issues:  Ear Lavage        7/10/2023     9:49 AM   Additional Questions   Roomed by Aileen     History of Present Illness       Reason for visit:  Ears a full of wax  Symptom onset:  More than a month  Symptoms include:  Wears hearing aids  Symptom intensity:  Mild  Symptom progression:  Staying the same  Had these symptoms before:  Yes    She eats 2-3 servings of fruits and vegetables daily.She consumes 0 sweetened beverage(s) daily.She exercises with enough effort to increase her heart rate 30 to 60 minutes per day.  She exercises with enough effort to increase her heart rate 5 days per week.   She is taking medications regularly.       Hearing aid people told her she has wax in her ears    hypertension   Well controlled  BP Readings from Last 6 Encounters:   07/10/23 128/70   03/24/23 130/77   12/12/22 133/74   10/31/22 120/70   08/11/22 132/71   03/28/22 130/84      diabetes   Diet controlled  Lab Results   Component Value Date    A1C 6.6 10/31/2022    A1C 6.4 09/09/2021    A1C 6.8 07/16/2020    A1C 6.2 09/04/2019    A1C 6.2 09/26/2017    A1C 6.2 11/11/2013    A1C 6.3 12/17/2012           Review of Systems   ROS: 5 point ROS negative except as noted above in " "HPI, including Gen., Resp., CV, GI &  system review.       Objective    /70   Pulse 95   Temp 97.6  F (36.4  C) (Tympanic)   Resp 20   Ht 1.588 m (5' 2.5\")   Wt 73.5 kg (162 lb)   SpO2 97%   BMI 29.16 kg/m    Body mass index is 29.16 kg/m .  Physical Exam   GENERAL: healthy, alert and no distress  HENT: both ears: occluded with wax, left greater than right,  nose and mouth without ulcers or lesions, oropharynx clear and oral mucous membranes moist  NECK: no adenopathy, no asymmetry, masses, or scars and thyroid normal to palpation  RESP: lungs clear to auscultation - no rales, rhonchi or wheezes  CV: regular rate and rhythm, normal S1 S2, no S3 or S4, no murmur, click or rub, no peripheral edema   MS: antalgic gait, with mildly bowed legs              "

## 2023-07-10 NOTE — LETTER
July 13, 2023      Mary TAN Kaelyn  [address]  [facility] [address]        Dear ,    We are writing to inform you of your test results.  same as last time, 6.6% which is good.       Resulted Orders   HEMOGLOBIN A1C   Result Value Ref Range    Hemoglobin A1C 6.6 (H) 0.0 - 5.6 %      Comment:      Normal <5.7%   Prediabetes 5.7-6.4%    Diabetes 6.5% or higher     Note: Adopted from ADA consensus guidelines.       If you have any questions or concerns, please call the clinic at the number listed above.       Sincerely,      Heather Chen MD/sandie

## 2023-07-10 NOTE — NURSING NOTE
"Chief Complaint   Patient presents with     Ear Lavage       Initial /70   Pulse 95   Temp 97.6  F (36.4  C) (Tympanic)   Resp 20   Ht 1.588 m (5' 2.5\")   Wt 73.5 kg (162 lb)   SpO2 97%   BMI 29.16 kg/m   Estimated body mass index is 29.16 kg/m  as calculated from the following:    Height as of this encounter: 1.588 m (5' 2.5\").    Weight as of this encounter: 73.5 kg (162 lb).    Patient presents to the clinic using Walker    Is there anyone who you would like to be able to receive your results? No  If yes have patient fill out KATALINA    "

## 2023-08-04 ENCOUNTER — OFFICE VISIT (OUTPATIENT)
Dept: OBGYN | Facility: CLINIC | Age: 78
End: 2023-08-04
Payer: COMMERCIAL

## 2023-08-04 VITALS
WEIGHT: 161 LBS | HEART RATE: 76 BPM | TEMPERATURE: 97.8 F | RESPIRATION RATE: 14 BRPM | BODY MASS INDEX: 28.53 KG/M2 | SYSTOLIC BLOOD PRESSURE: 127 MMHG | HEIGHT: 63 IN | DIASTOLIC BLOOD PRESSURE: 79 MMHG

## 2023-08-04 DIAGNOSIS — N81.10 VAGINAL PROLAPSE: ICD-10-CM

## 2023-08-04 PROCEDURE — 99213 OFFICE O/P EST LOW 20 MIN: CPT | Performed by: OBSTETRICS & GYNECOLOGY

## 2023-08-04 RX ORDER — ESTRADIOL 0.5 MG/1
TABLET ORAL
Qty: 24 TABLET | Refills: 3 | Status: SHIPPED | OUTPATIENT
Start: 2023-08-04 | End: 2024-02-09 | Stop reason: ALTCHOICE

## 2023-08-04 NOTE — PROGRESS NOTES
OB/GYN Clinic      Mary is a 77 year old   female who presents for pessary recheck;  she currently has a ring with support pessary which she keeps in place continuously;  she reports no problems with her pessary, no discharge, no bleeding. She is using vaginal estrogen twice a week. No worsening urinary symptoms or bowel symptoms.     All systems were reviewed and pertinent information in noted in subjective/HPI.     Past Medical History           Past Medical History:   Diagnosis Date    HTN              Past Surgical History             Past Surgical History:   Procedure Laterality Date    HYSTERECTOMY TOTAL ABDOMINAL        Total abdominal hysterectomy & bilateral salpingo-oophorectomy    KNEE SURGERY        Knee (L)            Current Outpatient Prescriptions               Current Outpatient Medications   Medication Sig Dispense Refill    acetaminophen (TYLENOL) 325 MG tablet Take 325-650 mg by mouth every 6 hours as needed for mild pain        aspirin 81 MG tablet Take 1 tablet by mouth daily.         CENTRUM SILVER OR TABS ONE DAILY 90 Tab 3    CRANBERRY          estradiol (ESTRACE) 0.5 MG tablet Place 1 tablet in the vagina twice weekly. 24 tablet 3    ibuprofen (ADVIL/MOTRIN) 600 MG tablet Take 1 tablet (600 mg) by mouth every 8 hours as needed for moderate pain 90 tablet 2    lisinopril-hydrochlorothiazide (ZESTORETIC) 10-12.5 MG tablet Take 1 tablet by mouth daily 90 tablet 3    VITAMIN C 500 MG OR TABS ONE TABLET DAILY 3 MONTHS 3    VITAMIN D PO Take 1 tablet by mouth daily.                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 No   Social History Narrative    None      Social Determinants of Health      Financial Resource Strain: Not on file   Food Insecurity: Not on file   Transportation Needs: Not on file   Physical Activity: Not on file   Stress: Not on file   Social Connections: Not on file   Intimate Partner Violence: Not on file   Housing Stability: Not on file         Family History             Family History   Problem Relation Age of Onset    Hypertension Mother      Hypertension Father      Diabetes Father      Hypertension Sister      Hypertension Brother      C.A.D. No family hx of              OBJECTIVE: /79 (BP Location: Right arm, Patient Position: Chair, Cuff Size: Adult Regular)   Pulse 76   Temp 97.8  F (36.6  C) (Tympanic)   Resp 14   Ht 1.588 m (5' 2.5\")   Wt 73 kg (161 lb)   BMI 28.98 kg/m         The pessary was removed without difficulty, cleaned in warm water and hibclens solution and then replaced, after inspection of the vulva and vagina, no lesions noted on vaginal exam     ASSESSMENT:       ICD-10-CM     1. Pessary maintenance  Z46.89            PLAN: RETURN TO CLINIC 3-4 months  Continue twice a week vaginal estradiol bernadette Vargas MD  OB/GYN     "

## 2023-08-04 NOTE — PROGRESS NOTES
"Initial /79 (BP Location: Right arm, Patient Position: Chair, Cuff Size: Adult Regular)   Pulse 76   Temp 97.8  F (36.6  C) (Tympanic)   Resp 14   Ht 1.588 m (5' 2.5\")   Wt 73 kg (161 lb)   BMI 28.98 kg/m   Estimated body mass index is 28.98 kg/m  as calculated from the following:    Height as of this encounter: 1.588 m (5' 2.5\").    Weight as of this encounter: 73 kg (161 lb). .    "

## 2023-08-10 ENCOUNTER — TELEPHONE (OUTPATIENT)
Dept: FAMILY MEDICINE | Facility: CLINIC | Age: 78
End: 2023-08-10
Payer: COMMERCIAL

## 2023-08-10 DIAGNOSIS — H90.3 SENSORINEURAL HEARING LOSS (SNHL) OF BOTH EARS: Primary | ICD-10-CM

## 2023-08-10 NOTE — TELEPHONE ENCOUNTER
Patients daughter, Cricket, calling. Patient is needing a referral to be seen with [facility] for her hearing test for her hearing aides.     Referral can be faxed to [phone]    Any questions can call Cricket at [phone]

## 2023-09-28 ENCOUNTER — OFFICE VISIT (OUTPATIENT)
Dept: FAMILY MEDICINE | Facility: CLINIC | Age: 78
End: 2023-09-28
Payer: COMMERCIAL

## 2023-09-28 ENCOUNTER — TELEPHONE (OUTPATIENT)
Dept: FAMILY MEDICINE | Facility: CLINIC | Age: 78
End: 2023-09-28

## 2023-09-28 VITALS
DIASTOLIC BLOOD PRESSURE: 66 MMHG | TEMPERATURE: 97.9 F | HEIGHT: 63 IN | RESPIRATION RATE: 16 BRPM | OXYGEN SATURATION: 98 % | HEART RATE: 86 BPM | BODY MASS INDEX: 29.06 KG/M2 | WEIGHT: 164 LBS | SYSTOLIC BLOOD PRESSURE: 120 MMHG

## 2023-09-28 DIAGNOSIS — E11.9 TYPE 2 DIABETES MELLITUS WITHOUT COMPLICATION, WITHOUT LONG-TERM CURRENT USE OF INSULIN (H): ICD-10-CM

## 2023-09-28 DIAGNOSIS — N39.0 ACUTE UTI: Primary | ICD-10-CM

## 2023-09-28 DIAGNOSIS — R35.0 URINARY FREQUENCY: ICD-10-CM

## 2023-09-28 LAB
ALBUMIN UR-MCNC: ABNORMAL MG/DL
AMORPH CRY #/AREA URNS HPF: ABNORMAL /HPF
APPEARANCE UR: CLEAR
BACTERIA #/AREA URNS HPF: ABNORMAL /HPF
BILIRUB UR QL STRIP: NEGATIVE
COLOR UR AUTO: YELLOW
GLUCOSE UR STRIP-MCNC: NEGATIVE MG/DL
HGB UR QL STRIP: ABNORMAL
KETONES UR STRIP-MCNC: NEGATIVE MG/DL
LEUKOCYTE ESTERASE UR QL STRIP: ABNORMAL
NITRATE UR QL: NEGATIVE
PH UR STRIP: 7 [PH] (ref 5–7)
RBC #/AREA URNS AUTO: ABNORMAL /HPF
SP GR UR STRIP: 1.02 (ref 1–1.03)
SQUAMOUS #/AREA URNS AUTO: ABNORMAL /LPF
UROBILINOGEN UR STRIP-ACNC: 1 E.U./DL
WBC #/AREA URNS AUTO: ABNORMAL /HPF

## 2023-09-28 PROCEDURE — 99214 OFFICE O/P EST MOD 30 MIN: CPT | Performed by: FAMILY MEDICINE

## 2023-09-28 PROCEDURE — 81001 URINALYSIS AUTO W/SCOPE: CPT | Performed by: FAMILY MEDICINE

## 2023-09-28 PROCEDURE — 87086 URINE CULTURE/COLONY COUNT: CPT | Performed by: FAMILY MEDICINE

## 2023-09-28 RX ORDER — NITROFURANTOIN 25; 75 MG/1; MG/1
100 CAPSULE ORAL 2 TIMES DAILY
Qty: 10 CAPSULE | Refills: 0 | Status: SHIPPED | OUTPATIENT
Start: 2023-09-28 | End: 2023-10-03

## 2023-09-28 ASSESSMENT — PAIN SCALES - GENERAL: PAINLEVEL: NO PAIN (0)

## 2023-09-28 NOTE — PROGRESS NOTES
"  Assessment & Plan     Acute UTI  Antibiotic   - nitroFURantoin macrocrystal-monohydrate (MACROBID) 100 MG capsule; Take 1 capsule (100 mg) by mouth 2 times daily for 5 days    Urinary frequency  - UA Macroscopic with reflex to Microscopic and Culture - Clinic Collect  - Urine Microscopic Exam  - Urine Culture    Type 2 diabetes mellitus without complication, without long-term current use of insulin (H)  Well controlled  Will be due in a few months    Patient Instructions   Start antibiotic   See you in November          BMI:   Estimated body mass index is 29.52 kg/m  as calculated from the following:    Height as of this encounter: 1.588 m (5' 2.5\").    Weight as of this encounter: 74.4 kg (164 lb).           Heather Michel MD  [facility]    Criss Hernandez is a 77 year old, presenting for the following health issues:  Urinary Problem        9/28/2023     1:41 PM   Additional Questions   Roomed by dagmar SALDIVAR       Genitourinary - Female  Onset/Duration: 2 days   Description:   Painful urination (Dysuria): No           Frequency: YES  Blood in urine (Hematuria): No  Delay in urine (Hesitency): No  Intensity: moderate  Progression of Symptoms:  same  Accompanying Signs & Symptoms:  Fever/chills: No  Flank pain: No  Nausea and vomiting: No  Vaginal symptoms: discharge  Abdominal/Pelvic Pain: No  History:   History of frequent UTI s: No  History of kidney stones: No  Sexually Active:   Possibility of pregnancy:   Precipitating or alleviating factors: None  Therapies tried and outcome:         Review of Systems   No fever, no abd pain, no dysuria. Some frequency.       Objective    /66   Pulse 86   Temp 97.9  F (36.6  C) (Tympanic)   Resp 16   Ht 1.588 m (5' 2.5\")   Wt 74.4 kg (164 lb)   SpO2 98%   BMI 29.52 kg/m    Body mass index is 29.52 kg/m .  Physical Exam   GENERAL: healthy, alert and no distress  NECK: no adenopathy, no asymmetry, masses, or scars and " thyroid normal to palpation  RESP: lungs clear to auscultation - no rales, rhonchi or wheezes  CV: regular rate and rhythm, normal S1 S2, no S3 or S4, no murmur, click or rub, no peripheral edema  ABDOMEN: soft, nontender, no hepatosplenomegaly, no masses, no flank pain    Results for orders placed or performed in visit on 09/28/23   UA Macroscopic with reflex to Microscopic and Culture - Clinic Collect     Status: Abnormal    Specimen: Urine, Midstream   Result Value Ref Range    Color Urine Yellow Colorless, Straw, Light Yellow, Yellow    Appearance Urine Clear Clear    Glucose Urine Negative Negative mg/dL    Bilirubin Urine Negative Negative    Ketones Urine Negative Negative mg/dL    Specific Gravity Urine 1.020 1.003 - 1.035    Blood Urine Moderate (A) Negative    pH Urine 7.0 5.0 - 7.0    Protein Albumin Urine Trace (A) Negative mg/dL    Urobilinogen Urine 1.0 0.2, 1.0 E.U./dL    Nitrite Urine Negative Negative    Leukocyte Esterase Urine Moderate (A) Negative   Urine Microscopic Exam     Status: Abnormal   Result Value Ref Range    Bacteria Urine Moderate (A) None Seen /HPF    RBC Urine 10-25 (A) 0-2 /HPF /HPF    WBC Urine 10-25 (A) 0-5 /HPF /HPF    Squamous Epithelials Urine Moderate (A) None Seen /LPF    Amorphous Crystals Urine Few (A) None Seen /HPF

## 2023-09-28 NOTE — TELEPHONE ENCOUNTER
Reason for Call:  Appointment Request    Patient requesting this type of appt:  Office visit    Requested provider:  anyone    Reason patient unable to be scheduled: Not within requested timeframe    When does patient want to be seen/preferred time: Same day    Comments: Pt wants to be seen sometime after 4 today for a possible bladder infection. Please contact pts daughter Cricket, she will be with her today.    Okay to leave a detailed message?: Yes at Cell number on file:    Telephone Information:   Mobile [phone]       Call taken on 9/28/2023 at 9:00 AM by Rosaura Baker

## 2023-10-01 LAB — BACTERIA UR CULT: NORMAL

## 2023-10-24 ENCOUNTER — TRANSFERRED RECORDS (OUTPATIENT)
Dept: MULTI SPECIALTY CLINIC | Facility: CLINIC | Age: 78
End: 2023-10-24

## 2023-10-24 LAB — RETINOPATHY: NORMAL

## 2023-11-07 ENCOUNTER — VIRTUAL VISIT (OUTPATIENT)
Dept: FAMILY MEDICINE | Facility: CLINIC | Age: 78
End: 2023-11-07
Payer: COMMERCIAL

## 2023-11-07 ENCOUNTER — LAB (OUTPATIENT)
Dept: LAB | Facility: CLINIC | Age: 78
End: 2023-11-07
Payer: COMMERCIAL

## 2023-11-07 DIAGNOSIS — I10 ESSENTIAL HYPERTENSION WITH GOAL BLOOD PRESSURE LESS THAN 140/90: ICD-10-CM

## 2023-11-07 DIAGNOSIS — N30.00 ACUTE CYSTITIS WITHOUT HEMATURIA: ICD-10-CM

## 2023-11-07 DIAGNOSIS — E11.9 DIABETES MELLITUS, TYPE 2 (H): Primary | ICD-10-CM

## 2023-11-07 DIAGNOSIS — N81.10 VAGINAL PROLAPSE: ICD-10-CM

## 2023-11-07 DIAGNOSIS — R35.0 URINE FREQUENCY: ICD-10-CM

## 2023-11-07 DIAGNOSIS — N30.00 ACUTE CYSTITIS WITHOUT HEMATURIA: Primary | ICD-10-CM

## 2023-11-07 LAB
ALBUMIN UR-MCNC: NEGATIVE MG/DL
APPEARANCE UR: CLEAR
BACTERIA #/AREA URNS HPF: ABNORMAL /HPF
BILIRUB UR QL STRIP: NEGATIVE
COLOR UR AUTO: YELLOW
CREAT UR-MCNC: 101.1 MG/DL
GLUCOSE UR STRIP-MCNC: NEGATIVE MG/DL
HGB UR QL STRIP: ABNORMAL
KETONES UR STRIP-MCNC: ABNORMAL MG/DL
LEUKOCYTE ESTERASE UR QL STRIP: ABNORMAL
MICROALBUMIN UR-MCNC: <12 MG/L
MICROALBUMIN/CREAT UR: NORMAL MG/G{CREAT}
NITRATE UR QL: NEGATIVE
PH UR STRIP: 5.5 [PH] (ref 5–7)
RBC #/AREA URNS AUTO: ABNORMAL /HPF
SP GR UR STRIP: 1.02 (ref 1–1.03)
SQUAMOUS #/AREA URNS AUTO: ABNORMAL /LPF
UROBILINOGEN UR STRIP-ACNC: 0.2 E.U./DL
WBC #/AREA URNS AUTO: ABNORMAL /HPF

## 2023-11-07 PROCEDURE — 82043 UR ALBUMIN QUANTITATIVE: CPT

## 2023-11-07 PROCEDURE — 81001 URINALYSIS AUTO W/SCOPE: CPT

## 2023-11-07 PROCEDURE — 99213 OFFICE O/P EST LOW 20 MIN: CPT | Mod: 95 | Performed by: NURSE PRACTITIONER

## 2023-11-07 PROCEDURE — 82570 ASSAY OF URINE CREATININE: CPT

## 2023-11-07 PROCEDURE — 87086 URINE CULTURE/COLONY COUNT: CPT

## 2023-11-07 RX ORDER — NITROFURANTOIN 25; 75 MG/1; MG/1
100 CAPSULE ORAL 2 TIMES DAILY
Qty: 10 CAPSULE | Refills: 0 | Status: SHIPPED | OUTPATIENT
Start: 2023-11-07 | End: 2023-11-12

## 2023-11-07 RX ORDER — LISINOPRIL/HYDROCHLOROTHIAZIDE 10-12.5 MG
1 TABLET ORAL DAILY
Qty: 90 TABLET | Refills: 3 | Status: CANCELLED | OUTPATIENT
Start: 2023-11-07

## 2023-11-07 RX ORDER — ESTRADIOL 0.5 MG/1
TABLET ORAL
Qty: 24 TABLET | Refills: 3 | Status: CANCELLED | OUTPATIENT
Start: 2023-11-07

## 2023-11-07 NOTE — PROGRESS NOTES
"Mary is a 77 year old who is being evaluated via a billable telephone visit.      What phone number would you like to be contacted at? [phone]  How would you like to obtain your AVS? Mail a copy    Distant Location (provider location):  On-site    Assessment & Plan     Acute cystitis without hematuria  - Urine Culture Aerobic Bacterial - lab collect; Future  - nitroFURantoin macrocrystal-monohydrate (MACROBID) 100 MG capsule; Take 1 capsule (100 mg) by mouth 2 times daily for 5 days    Vaginal prolapse  Stable     Essential hypertension with goal blood pressure less than 140/90   Controlled no change in treatment plan     Urine frequency  - UA Macroscopic with reflex to Microscopic and Culture - Lab Collect; Future       BMI:   Estimated body mass index is 29.63 kg/m  as calculated from the following:    Height as of 11/10/23: 1.588 m (5' 2.5\").    Weight as of 11/10/23: 74.7 kg (164 lb 9.6 oz).   Weight management plan: Patient was referred to their PCP to discuss a diet and exercise plan.    See Patient Instructions    REYES Dick CNP  [facility]    Subjective   Mary is a 77 year old, presenting for the following health issues:  No chief complaint on file.      11/7/2023    11:46 AM   Additional Questions   Roomed by Haley IRWIN       [facility]     Hypertension Follow-up    Do you check your blood pressure regularly outside of the clinic? No   Are you following a low salt diet? No  Are your blood pressures ever more than 140 on the top number (systolic) OR more   than 90 on the bottom number (diastolic), for example 140/90? No      Patient is following up with her urinary tract infection.    URINARY TRACT SYMPTOMS  Onset: about one month  Description:   Painful urination (Dysuria): no            Frequency: YES  Blood in urine (Hematuria): no   Delay in urine (Hesitency): no   Intensity: moderate  Progression of Symptoms:  worsening  Accompanying Signs & Symptoms:  Fever/chills: " no   Flank pain no   Nausea and vomiting: no   Any vaginal symptoms: vaginal discharge  Abdominal/Pelvic Pain: no   History:   History of frequent UTI's: no   History of kidney stones: no   Sexually Active: no   Possibility of pregnancy: No  Precipitating factors:   none    Therapies Tried and outcome: antibiotic             Review of Systems   Constitutional, HEENT, cardiovascular, pulmonary, gi and gu systems are negative, except as otherwise noted.      Objective           Vitals:  No vitals were obtained today due to virtual visit.    Physical Exam   healthy, alert, and no distress  PSYCH: Alert and oriented times 3; coherent speech, normal   rate and volume, able to articulate logical thoughts, able   to abstract reason, no tangential thoughts, no hallucinations   or delusions  Her affect is normal  RESP: No cough, no audible wheezing, able to talk in full sentences  Remainder of exam unable to be completed due to telephone visits    Results for orders placed or performed in visit on 11/07/23   Albumin Random Urine Quantitative with Creat Ratio     Status: None   Result Value Ref Range    Creatinine Urine mg/dL 101.1 mg/dL    Albumin Urine mg/L <12.0 mg/L    Albumin Urine mg/g Cr     UA Macroscopic with reflex to Microscopic and Culture - Lab Collect     Status: Abnormal    Specimen: Urine, Midstream   Result Value Ref Range    Color Urine Yellow Colorless, Straw, Light Yellow, Yellow    Appearance Urine Clear Clear    Glucose Urine Negative Negative mg/dL    Bilirubin Urine Negative Negative    Ketones Urine Trace (A) Negative mg/dL    Specific Gravity Urine 1.025 1.003 - 1.035    Blood Urine Small (A) Negative    pH Urine 5.5 5.0 - 7.0    Protein Albumin Urine Negative Negative mg/dL    Urobilinogen Urine 0.2 0.2, 1.0 E.U./dL    Nitrite Urine Negative Negative    Leukocyte Esterase Urine Moderate (A) Negative   Urine Microscopic Exam     Status: Abnormal   Result Value Ref Range    Bacteria Urine Few (A) None  Seen /HPF    RBC Urine 0-2 0-2 /HPF /HPF    WBC Urine 5-10 (A) 0-5 /HPF /HPF    Squamous Epithelials Urine Few (A) None Seen /LPF   Urine Culture Aerobic Bacterial - lab collect     Status: None    Specimen: Urine, Midstream   Result Value Ref Range    Culture 10,000-50,000 CFU/mL Mixture of Urogenital Jody                Phone call duration: 15  minutes

## 2023-11-08 DIAGNOSIS — I10 ESSENTIAL HYPERTENSION WITH GOAL BLOOD PRESSURE LESS THAN 140/90: ICD-10-CM

## 2023-11-08 RX ORDER — LISINOPRIL/HYDROCHLOROTHIAZIDE 10-12.5 MG
1 TABLET ORAL DAILY
Qty: 90 TABLET | Refills: 3 | Status: SHIPPED | OUTPATIENT
Start: 2023-11-08

## 2023-11-08 NOTE — TELEPHONE ENCOUNTER
"Has appointment scheduled for 11/14/23      Requested Prescriptions   Pending Prescriptions Disp Refills    lisinopril-hydrochlorothiazide (ZESTORETIC) 10-12.5 MG tablet [Pharmacy Med Name: LISINOPRIL-HYDROCHLORO 10-12.5 TABS] 90 tablet 3     Sig: TAKE ONE TABLET BY MOUTH ONCE DAILY       Diuretics (Including Combos) Protocol Failed - 11/8/2023  9:31 AM        Failed - Normal serum creatinine on file in past 12 months     Recent Labs   Lab Test 10/31/22  1403   CR 0.72              Failed - Normal serum potassium on file in past 12 months     Recent Labs   Lab Test 10/31/22  1403   POTASSIUM 4.1                    Failed - Normal serum sodium on file in past 12 months     Recent Labs   Lab Test 10/31/22  1403                 Passed - Blood pressure under 140/90 in past 12 months     BP Readings from Last 3 Encounters:   09/28/23 120/66   08/04/23 127/79   07/10/23 128/70                 Passed - Recent (12 mo) or future (30 days) visit within the authorizing provider's specialty     Patient has had an office visit with the authorizing provider or a provider within the authorizing providers department within the previous 12 mos or has a future within next 30 days. See \"Patient Info\" tab in inbasket, or \"Choose Columns\" in Meds & Orders section of the refill encounter.              Passed - Medication is active on med list        Passed - Patient is age 18 or older        Passed - No active pregancy on record        Passed - No positive pregnancy test in past 12 months       ACE Inhibitors (Including Combos) Protocol Failed - 11/8/2023  9:31 AM        Failed - Normal serum creatinine on file in past 12 months     Recent Labs   Lab Test 10/31/22  1403   CR 0.72       Ok to refill medication if creatinine is low          Failed - Normal serum potassium on file in past 12 months     Recent Labs   Lab Test 10/31/22  1403   POTASSIUM 4.1             Passed - Blood pressure under 140/90 in past 12 months     BP " "Readings from Last 3 Encounters:   09/28/23 120/66   08/04/23 127/79   07/10/23 128/70                 Passed - Recent (12 mo) or future (30 days) visit within the authorizing provider's specialty     Patient has had an office visit with the authorizing provider or a provider within the authorizing providers department within the previous 12 mos or has a future within next 30 days. See \"Patient Info\" tab in inbasket, or \"Choose Columns\" in Meds & Orders section of the refill encounter.              Passed - Medication is active on med list        Passed - Patient is age 18 or older        Passed - No active pregnancy on record        Passed - No positive pregnancy test within past 12 months             "

## 2023-11-09 LAB — BACTERIA UR CULT: NORMAL

## 2023-11-10 ENCOUNTER — IMMUNIZATION (OUTPATIENT)
Dept: FAMILY MEDICINE | Facility: CLINIC | Age: 78
End: 2023-11-10
Payer: COMMERCIAL

## 2023-11-10 ENCOUNTER — OFFICE VISIT (OUTPATIENT)
Dept: OBGYN | Facility: CLINIC | Age: 78
End: 2023-11-10
Payer: COMMERCIAL

## 2023-11-10 VITALS
HEIGHT: 63 IN | TEMPERATURE: 96.8 F | RESPIRATION RATE: 16 BRPM | DIASTOLIC BLOOD PRESSURE: 92 MMHG | BODY MASS INDEX: 29.16 KG/M2 | SYSTOLIC BLOOD PRESSURE: 146 MMHG | WEIGHT: 164.6 LBS | HEART RATE: 83 BPM

## 2023-11-10 DIAGNOSIS — N81.10 VAGINAL PROLAPSE: Primary | ICD-10-CM

## 2023-11-10 PROCEDURE — 90480 ADMN SARSCOV2 VAC 1/ONLY CMP: CPT

## 2023-11-10 PROCEDURE — 91320 SARSCV2 VAC 30MCG TRS-SUC IM: CPT

## 2023-11-10 PROCEDURE — 99213 OFFICE O/P EST LOW 20 MIN: CPT | Performed by: OBSTETRICS & GYNECOLOGY

## 2023-11-10 NOTE — PROGRESS NOTES
"Initial BP (!) 146/92 (BP Location: Right arm, Patient Position: Chair, Cuff Size: Adult Regular)   Pulse 83   Temp 96.8  F (36  C) (Tympanic)   Resp 16   Ht 1.588 m (5' 2.5\")   Wt 74.7 kg (164 lb 9.6 oz)   BMI 29.63 kg/m   Estimated body mass index is 29.63 kg/m  as calculated from the following:    Height as of this encounter: 1.588 m (5' 2.5\").    Weight as of this encounter: 74.7 kg (164 lb 9.6 oz). .    "

## 2023-11-10 NOTE — PROGRESS NOTES
OB/GYN Clinic      Mary is a 77 year old   female who presents for pessary recheck;  she currently has a ring with support pessary which she keeps in place continuously. Denies vaginal bleeding, but reports it has a lot of discharge. She is using vaginal estrogen twice a week. Recent UTI x2, currently on antibiotics.      All systems were reviewed and pertinent information in noted in subjective/HPI.     Past Medical History           Past Medical History:   Diagnosis Date    HTN              Past Surgical History             Past Surgical History:   Procedure Laterality Date    HYSTERECTOMY TOTAL ABDOMINAL        Total abdominal hysterectomy & bilateral salpingo-oophorectomy    KNEE SURGERY        Knee (L)            Current Outpatient Prescriptions               Current Outpatient Medications   Medication Sig Dispense Refill    acetaminophen (TYLENOL) 325 MG tablet Take 325-650 mg by mouth every 6 hours as needed for mild pain        aspirin 81 MG tablet Take 1 tablet by mouth daily.         CENTRUM SILVER OR TABS ONE DAILY 90 Tab 3    CRANBERRY          estradiol (ESTRACE) 0.5 MG tablet Place 1 tablet in the vagina twice weekly. 24 tablet 3    ibuprofen (ADVIL/MOTRIN) 600 MG tablet Take 1 tablet (600 mg) by mouth every 8 hours as needed for moderate pain 90 tablet 2    lisinopril-hydrochlorothiazide (ZESTORETIC) 10-12.5 MG tablet Take 1 tablet by mouth daily 90 tablet 3    VITAMIN C 500 MG OR TABS ONE TABLET DAILY 3 MONTHS 3    VITAMIN D PO Take 1 tablet by mouth daily.                Social History                Socioeconomic History    Marital status:        Spouse name: None    Number of children: None    Years of education: None    Highest education level: None   Occupational History    None   Tobacco Use    Smoking status: Never Smoker    Smokeless tobacco: Never Used   Vaping Use    Vaping Use: Never used   Substance and Sexual Activity    Alcohol use: No    Drug use: No    Sexual  "activity: Yes       Partners: Male   Other Topics Concern     Service No    Blood Transfusions No    Caffeine Concern No       Comment: 0--2 cups    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 No   Social History Narrative    None      Social Determinants of Health      Financial Resource Strain: Not on file   Food Insecurity: Not on file   Transportation Needs: Not on file   Physical Activity: Not on file   Stress: Not on file   Social Connections: Not on file   Intimate Partner Violence: Not on file   Housing Stability: Not on file         Family History             Family History   Problem Relation Age of Onset    Hypertension Mother      Hypertension Father      Diabetes Father      Hypertension Sister      Hypertension Brother      C.A.D. No family hx of              OBJECTIVE: BP (!) 146/92 (BP Location: Right arm, Patient Position: Chair, Cuff Size: Adult Regular)   Pulse 83   Temp 96.8  F (36  C) (Tympanic)   Resp 16   Ht 1.588 m (5' 2.5\")   Wt 74.7 kg (164 lb 9.6 oz)   BMI 29.63 kg/m            The pessary was removed without difficulty, cleaned in warm water and hibclens solution and then replaced, after inspection of the vulva and vagina, no lesions noted on vaginal exam. Scant vaginal bleeding with removal.      ASSESSMENT:       ICD-10-CM     1. Pessary maintenance  Z46.89            PLAN: RETURN TO CLINIC 3 months  Continue twice a week vaginal estradiol tabs     Rona Vargas MD  OB/GYN     "

## 2023-11-14 ENCOUNTER — ANCILLARY PROCEDURE (OUTPATIENT)
Dept: MAMMOGRAPHY | Facility: CLINIC | Age: 78
End: 2023-11-14
Payer: COMMERCIAL

## 2023-11-14 ENCOUNTER — OFFICE VISIT (OUTPATIENT)
Dept: FAMILY MEDICINE | Facility: CLINIC | Age: 78
End: 2023-11-14
Payer: COMMERCIAL

## 2023-11-14 VITALS
HEIGHT: 63 IN | RESPIRATION RATE: 20 BRPM | BODY MASS INDEX: 29.41 KG/M2 | OXYGEN SATURATION: 97 % | DIASTOLIC BLOOD PRESSURE: 70 MMHG | SYSTOLIC BLOOD PRESSURE: 104 MMHG | HEART RATE: 96 BPM | TEMPERATURE: 97.6 F | WEIGHT: 166 LBS

## 2023-11-14 DIAGNOSIS — L84 CALLUS OF FOOT: ICD-10-CM

## 2023-11-14 DIAGNOSIS — Z12.31 VISIT FOR SCREENING MAMMOGRAM: ICD-10-CM

## 2023-11-14 DIAGNOSIS — I10 ESSENTIAL HYPERTENSION WITH GOAL BLOOD PRESSURE LESS THAN 140/90: ICD-10-CM

## 2023-11-14 DIAGNOSIS — E11.9 TYPE 2 DIABETES MELLITUS WITHOUT COMPLICATION, WITHOUT LONG-TERM CURRENT USE OF INSULIN (H): ICD-10-CM

## 2023-11-14 DIAGNOSIS — B35.1 ONYCHOMYCOSIS: ICD-10-CM

## 2023-11-14 DIAGNOSIS — Z00.00 ENCOUNTER FOR MEDICARE ANNUAL WELLNESS EXAM: Primary | ICD-10-CM

## 2023-11-14 LAB
ALBUMIN SERPL BCG-MCNC: 4.1 G/DL (ref 3.5–5.2)
ALP SERPL-CCNC: 101 U/L (ref 40–150)
ALT SERPL W P-5'-P-CCNC: 18 U/L (ref 0–50)
ANION GAP SERPL CALCULATED.3IONS-SCNC: 10 MMOL/L (ref 7–15)
AST SERPL W P-5'-P-CCNC: 21 U/L (ref 0–45)
BASOPHILS # BLD AUTO: 0 10E3/UL (ref 0–0.2)
BASOPHILS NFR BLD AUTO: 0 %
BILIRUB DIRECT SERPL-MCNC: <0.2 MG/DL (ref 0–0.3)
BILIRUB SERPL-MCNC: 0.2 MG/DL
BUN SERPL-MCNC: 20.8 MG/DL (ref 8–23)
CALCIUM SERPL-MCNC: 9.5 MG/DL (ref 8.8–10.2)
CHLORIDE SERPL-SCNC: 103 MMOL/L (ref 98–107)
CHOLEST SERPL-MCNC: 148 MG/DL
CREAT SERPL-MCNC: 0.67 MG/DL (ref 0.51–0.95)
DEPRECATED HCO3 PLAS-SCNC: 28 MMOL/L (ref 22–29)
EGFRCR SERPLBLD CKD-EPI 2021: 88 ML/MIN/1.73M2
EOSINOPHIL # BLD AUTO: 0.1 10E3/UL (ref 0–0.7)
EOSINOPHIL NFR BLD AUTO: 1 %
ERYTHROCYTE [DISTWIDTH] IN BLOOD BY AUTOMATED COUNT: 13 % (ref 10–15)
GLUCOSE SERPL-MCNC: 117 MG/DL (ref 70–99)
HBA1C MFR BLD: 6.7 % (ref 0–5.6)
HCT VFR BLD AUTO: 37.6 % (ref 35–47)
HDLC SERPL-MCNC: 60 MG/DL
HGB BLD-MCNC: 12 G/DL (ref 11.7–15.7)
IMM GRANULOCYTES # BLD: 0 10E3/UL
IMM GRANULOCYTES NFR BLD: 0 %
LDLC SERPL CALC-MCNC: 68 MG/DL
LYMPHOCYTES # BLD AUTO: 2 10E3/UL (ref 0.8–5.3)
LYMPHOCYTES NFR BLD AUTO: 23 %
MCH RBC QN AUTO: 30.4 PG (ref 26.5–33)
MCHC RBC AUTO-ENTMCNC: 31.9 G/DL (ref 31.5–36.5)
MCV RBC AUTO: 95 FL (ref 78–100)
MONOCYTES # BLD AUTO: 1.4 10E3/UL (ref 0–1.3)
MONOCYTES NFR BLD AUTO: 17 %
NEUTROPHILS # BLD AUTO: 5.1 10E3/UL (ref 1.6–8.3)
NEUTROPHILS NFR BLD AUTO: 59 %
NONHDLC SERPL-MCNC: 88 MG/DL
PLATELET # BLD AUTO: 318 10E3/UL (ref 150–450)
POTASSIUM SERPL-SCNC: 4.1 MMOL/L (ref 3.4–5.3)
PROT SERPL-MCNC: 7.2 G/DL (ref 6.4–8.3)
RBC # BLD AUTO: 3.95 10E6/UL (ref 3.8–5.2)
SODIUM SERPL-SCNC: 141 MMOL/L (ref 135–145)
TRIGL SERPL-MCNC: 102 MG/DL
WBC # BLD AUTO: 8.6 10E3/UL (ref 4–11)

## 2023-11-14 PROCEDURE — 77067 SCR MAMMO BI INCL CAD: CPT | Mod: TC | Performed by: RADIOLOGY

## 2023-11-14 PROCEDURE — 85025 COMPLETE CBC W/AUTO DIFF WBC: CPT | Performed by: FAMILY MEDICINE

## 2023-11-14 PROCEDURE — 83036 HEMOGLOBIN GLYCOSYLATED A1C: CPT | Performed by: FAMILY MEDICINE

## 2023-11-14 PROCEDURE — 80061 LIPID PANEL: CPT | Performed by: FAMILY MEDICINE

## 2023-11-14 PROCEDURE — 11055 PARING/CUTG B9 HYPRKER LES 1: CPT | Performed by: FAMILY MEDICINE

## 2023-11-14 PROCEDURE — 36415 COLL VENOUS BLD VENIPUNCTURE: CPT | Performed by: FAMILY MEDICINE

## 2023-11-14 PROCEDURE — G0439 PPPS, SUBSEQ VISIT: HCPCS | Performed by: FAMILY MEDICINE

## 2023-11-14 PROCEDURE — 99214 OFFICE O/P EST MOD 30 MIN: CPT | Mod: 25 | Performed by: FAMILY MEDICINE

## 2023-11-14 PROCEDURE — 99207 PR FOOT EXAM NO CHARGE: CPT | Performed by: FAMILY MEDICINE

## 2023-11-14 PROCEDURE — 88305 TISSUE EXAM BY PATHOLOGIST: CPT | Performed by: DERMATOLOGY

## 2023-11-14 PROCEDURE — 80053 COMPREHEN METABOLIC PANEL: CPT | Performed by: FAMILY MEDICINE

## 2023-11-14 PROCEDURE — 82248 BILIRUBIN DIRECT: CPT | Performed by: FAMILY MEDICINE

## 2023-11-14 PROCEDURE — 77063 BREAST TOMOSYNTHESIS BI: CPT | Mod: TC | Performed by: RADIOLOGY

## 2023-11-14 RX ORDER — TERBINAFINE HYDROCHLORIDE 250 MG/1
250 TABLET ORAL DAILY
Qty: 90 TABLET | Refills: 0 | Status: SHIPPED | OUTPATIENT
Start: 2023-11-14 | End: 2024-02-12

## 2023-11-14 ASSESSMENT — ENCOUNTER SYMPTOMS
CHILLS: 0
FEVER: 0
HEMATOCHEZIA: 0
ARTHRALGIAS: 0
SORE THROAT: 0
ABDOMINAL PAIN: 0
PARESTHESIAS: 0
NAUSEA: 0
MYALGIAS: 0
PALPITATIONS: 0
HEADACHES: 0
DIZZINESS: 0
SHORTNESS OF BREATH: 0
JOINT SWELLING: 0
FREQUENCY: 0
COUGH: 0
DYSURIA: 0
HEARTBURN: 0
DIARRHEA: 0
CONSTIPATION: 0
HEMATURIA: 0
EYE PAIN: 0
NERVOUS/ANXIOUS: 0
WEAKNESS: 0
BREAST MASS: 0

## 2023-11-14 ASSESSMENT — ACTIVITIES OF DAILY LIVING (ADL): CURRENT_FUNCTION: NO ASSISTANCE NEEDED

## 2023-11-14 ASSESSMENT — PAIN SCALES - GENERAL: PAINLEVEL: NO PAIN (0)

## 2023-11-14 NOTE — NURSING NOTE
"Chief Complaint   Patient presents with    Wellness Visit       Initial /70 (BP Location: Right arm)   Pulse 96   Temp 97.6  F (36.4  C) (Tympanic)   Resp 20   Ht 1.588 m (5' 2.5\")   Wt 75.3 kg (166 lb)   SpO2 97%   BMI 29.88 kg/m   Estimated body mass index is 29.88 kg/m  as calculated from the following:    Height as of this encounter: 1.588 m (5' 2.5\").    Weight as of this encounter: 75.3 kg (166 lb).    Patient presents to the clinic using Cane    Is there anyone who you would like to be able to receive your results? No  If yes have patient fill out KATALINA      "

## 2023-11-14 NOTE — PATIENT INSTRUCTIONS
Mammogram today  Lab Results   Component Value Date    A1C 6.7 11/14/2023    A1C 6.6 07/10/2023    A1C 6.6 10/31/2022     Get the toenails trimmed  Take the antifungal once a day for 12 weeks        Patient Education   Personalized Prevention Plan  You are due for the preventive services outlined below.  Your care team is available to assist you in scheduling these services.  If you have already completed any of these items, please share that information with your care team to update in your medical record.  Health Maintenance Due   Topic Date Due    Diabetic Foot Exam  Never done    Eye Exam  Never done    Pneumococcal Vaccine (1 - PCV) Never done    Hepatitis C Screening  Never done    Diptheria Tetanus Pertussis (DTAP/TDAP/TD) Vaccine (1 - Tdap) Never done    Zoster (Shingles) Vaccine (1 of 2) Never done    Basic Metabolic Panel  10/31/2023    Cholesterol Lab  10/31/2023    Annual Wellness Visit  10/31/2023

## 2023-11-14 NOTE — LETTER
November 15, 2023      Mary Art  [address]  [facility] [address]        Dear ,    We are writing to inform you of your test results.    Labs all look good. Your A1C was good as we discussed.     Resulted Orders   Lipid panel reflex to direct LDL Non-fasting   Result Value Ref Range    Cholesterol 148 <200 mg/dL    Triglycerides 102 <150 mg/dL    Direct Measure HDL 60 >=50 mg/dL    LDL Cholesterol Calculated 68 <=100 mg/dL    Non HDL Cholesterol 88 <130 mg/dL    Narrative    Cholesterol  Desirable:  <200 mg/dL    Triglycerides  Normal:  Less than 150 mg/dL  Borderline High:  150-199 mg/dL  High:  200-499 mg/dL  Very High:  Greater than or equal to 500 mg/dL    Direct Measure HDL  Female:  Greater than or equal to 50 mg/dL   Male:  Greater than or equal to 40 mg/dL    LDL Cholesterol  Desirable:  <100mg/dL  Above Desirable:  100-129 mg/dL   Borderline High:  130-159 mg/dL   High:  160-189 mg/dL   Very High:  >= 190 mg/dL    Non HDL Cholesterol  Desirable:  130 mg/dL  Above Desirable:  130-159 mg/dL  Borderline High:  160-189 mg/dL  High:  190-219 mg/dL  Very High:  Greater than or equal to 220 mg/dL   BASIC METABOLIC PANEL   Result Value Ref Range    Sodium 141 135 - 145 mmol/L      Comment:      Reference intervals for this test were updated on 09/26/2023 to more accurately reflect our healthy population. There may be differences in the flagging of prior results with similar values performed with this method. Interpretation of those prior results can be made in the context of the updated reference intervals.     Potassium 4.1 3.4 - 5.3 mmol/L    Chloride 103 98 - 107 mmol/L    Carbon Dioxide (CO2) 28 22 - 29 mmol/L    Anion Gap 10 7 - 15 mmol/L    Urea Nitrogen 20.8 8.0 - 23.0 mg/dL    Creatinine 0.67 0.51 - 0.95 mg/dL    GFR Estimate 88 >60 mL/min/1.73m2    Calcium 9.5 8.8 - 10.2 mg/dL    Glucose 117 (H) 70 - 99 mg/dL   HEMOGLOBIN A1C   Result Value Ref Range    Hemoglobin A1C 6.7 (H) 0.0 - 5.6  %      Comment:      Normal <5.7%   Prediabetes 5.7-6.4%    Diabetes 6.5% or higher     Note: Adopted from ADA consensus guidelines.   CBC with platelets and differential   Result Value Ref Range    WBC Count 8.6 4.0 - 11.0 10e3/uL    RBC Count 3.95 3.80 - 5.20 10e6/uL    Hemoglobin 12.0 11.7 - 15.7 g/dL    Hematocrit 37.6 35.0 - 47.0 %    MCV 95 78 - 100 fL    MCH 30.4 26.5 - 33.0 pg    MCHC 31.9 31.5 - 36.5 g/dL    RDW 13.0 10.0 - 15.0 %    Platelet Count 318 150 - 450 10e3/uL    % Neutrophils 59 %    % Lymphocytes 23 %    % Monocytes 17 %    % Eosinophils 1 %    % Basophils 0 %    % Immature Granulocytes 0 %    Absolute Neutrophils 5.1 1.6 - 8.3 10e3/uL    Absolute Lymphocytes 2.0 0.8 - 5.3 10e3/uL    Absolute Monocytes 1.4 (H) 0.0 - 1.3 10e3/uL    Absolute Eosinophils 0.1 0.0 - 0.7 10e3/uL    Absolute Basophils 0.0 0.0 - 0.2 10e3/uL    Absolute Immature Granulocytes 0.0 <=0.4 10e3/uL   Hepatic panel (Albumin, ALT, AST, Bili, Alk Phos, TP)   Result Value Ref Range    Protein Total 7.2 6.4 - 8.3 g/dL    Albumin 4.1 3.5 - 5.2 g/dL    Bilirubin Total 0.2 <=1.2 mg/dL    Alkaline Phosphatase 101 40 - 150 U/L      Comment:      Reference intervals for this test were updated on 11/14/2023 to more accurately reflect our healthy population. There may be differences in the flagging of prior results with similar values performed with this method. Interpretation of those prior results can be made in the context of the updated reference intervals.    AST 21 0 - 45 U/L      Comment:      Reference intervals for this test were updated on 6/12/2023 to more accurately reflect our healthy population. There may be differences in the flagging of prior results with similar values performed with this method. Interpretation of those prior results can be made in the context of the updated reference intervals.    ALT 18 0 - 50 U/L      Comment:      Reference intervals for this test were updated on 6/12/2023 to more accurately reflect  our healthy population. There may be differences in the flagging of prior results with similar values performed with this method. Interpretation of those prior results can be made in the context of the updated reference intervals.      Bilirubin Direct <0.20 0.00 - 0.30 mg/dL       If you have any questions or concerns, please call the clinic at the number listed above.       Sincerely,      Heather Chen MD

## 2023-11-14 NOTE — PROGRESS NOTES
"SUBJECTIVE:   Mary is a 77 year old who presents for Preventive Visit.      11/14/2023     7:19 AM   Additional Questions   Roomed by Aileen       Are you in the first 12 months of your Medicare coverage?  No    Healthy Habits:     In general, how would you rate your overall health?  Good    Frequency of exercise:  4-5 days/week    Duration of exercise:  Greater than 60 minutes    Do you usually eat at least 4 servings of fruit and vegetables a day, include whole grains    & fiber and avoid regularly eating high fat or \"junk\" foods?  Yes    Taking medications regularly:  Yes    Medication side effects:  None    Ability to successfully perform activities of daily living:  No assistance needed    Home Safety:  No safety concerns identified    Hearing Impairment:  No hearing concerns    In the past 6 months, have you been bothered by leaking of urine?  No    In general, how would you rate your overall mental or emotional health?  Good    Additional concerns today:  No      Today's PHQ-2 Score:       11/14/2023     7:18 AM   PHQ-2 ( 1999 [employer])   Q1: Little interest or pleasure in doing things 0   Q2: Feeling down, depressed or hopeless 0   PHQ-2 Score 0   Q1: Little interest or pleasure in doing things Not at all   Q2: Feeling down, depressed or hopeless Not at all   PHQ-2 Score 0           Have you ever done Advance Care Planning? (For example, a Health Directive, POLST, or a discussion with a medical provider or your loved ones about your wishes): No, advance care planning information given to patient to review.  Patient plans to discuss their wishes with loved ones or provider.         Fall risk  Fallen 2 or more times in the past year?: No  Any fall with injury in the past year?: No    Cognitive Screening   1) Repeat 3 items (Leader, Season, Table)    2) Clock draw: NORMAL  3) 3 item recall: Recalls 2 objects   Results: NORMAL clock, 1-2 items recalled: COGNITIVE IMPAIRMENT LESS LIKELY    Mini-CogTM Copyright S " [employer]. Licensed by the author for use in [facility]; reprinted with permission (rishabh@[facility]). All rights reserved.      Do you have sleep apnea, excessive snoring or daytime drowsiness? : no    Reviewed and updated as needed this visit by clinical staff   Tobacco  Allergies  Meds  Problems  Med Hx  Surg Hx  Fam Hx          Reviewed and updated as needed this visit by Provider   Tobacco  Allergies  Meds  Problems  Med Hx  Surg Hx  Fam Hx         Social History     Tobacco Use     Smoking status: Never     Smokeless tobacco: Never   Substance Use Topics     Alcohol use: No             11/14/2023     7:18 AM   Alcohol Use   Prescreen: >3 drinks/day or >7 drinks/week? No     Do you have a current opioid prescription? No  Do you use any other controlled substances or medications that are not prescribed by a provider? None      Diabetes - does not check sugars  Diet controlled  Lab Results   Component Value Date    A1C 6.7 11/14/2023    A1C 6.6 07/10/2023    A1C 6.6 10/31/2022    A1C 6.4 09/09/2021    A1C 6.8 07/16/2020    A1C 6.2 09/04/2019    A1C 6.2 09/26/2017    A1C 6.2 11/11/2013    A1C 6.3 12/17/2012        Hypertension Follow-up    Do you check your blood pressure regularly outside of the clinic? No   Are you following a low salt diet? No    BP Readings from Last 6 Encounters:   11/14/23 104/70   11/10/23 (!) 146/92   09/28/23 120/66   08/04/23 127/79   07/10/23 128/70   03/24/23 130/77      On lisinopril hydrochlorothiazide     Current providers sharing in care for this patient include:   Patient Care Team:  Heather Chen MD as PCP - General  Rona Vargas MD as Assigned OBGYN Provider  Heather Chen MD as Assigned PCP    The following health maintenance items are reviewed in Epic and correct as of today:  Health Maintenance   Topic Date Due     DIABETIC FOOT EXAM  Never done     EYE EXAM  Never done     Pneumococcal Vaccine: 65+ Years (1 - PCV) Never done      HEPATITIS C SCREENING  Never done     DTAP/TDAP/TD IMMUNIZATION (1 - Tdap) Never done     ZOSTER IMMUNIZATION (1 of 2) Never done     MEDICARE ANNUAL WELLNESS VISIT  10/31/2023     RSV VACCINE (Pregnancy & 60+) (1 - 1-dose 60+ series) 12/10/2023 (Originally 11/30/2003)     INFLUENZA VACCINE (1) 06/30/2024 (Originally 9/1/2023)     A1C  02/14/2024     ANNUAL REVIEW OF HM ORDERS  07/10/2024     MICROALBUMIN  11/07/2024     BMP  11/14/2024     LIPID  11/14/2024     FALL RISK ASSESSMENT  11/14/2024     ADVANCE CARE PLANNING  11/14/2028     DEXA  11/22/2031     PHQ-2 (once per calendar year)  Completed     COVID-19 Vaccine  Completed     IPV IMMUNIZATION  Aged Out     HPV IMMUNIZATION  Aged Out     MENINGITIS IMMUNIZATION  Aged Out     RSV MONOCLONAL ANTIBODY  Aged Out       Mammogram Screening: Mammogram Screening - Patient over age 75, has elected to continue with screening.      Pertinent mammograms are reviewed under the imaging tab.    Review of Systems   Constitutional:  Negative for chills and fever.   HENT:  Positive for hearing loss. Negative for congestion, ear pain and sore throat.    Eyes:  Negative for pain and visual disturbance.   Respiratory:  Negative for cough and shortness of breath.    Cardiovascular:  Negative for chest pain, palpitations and peripheral edema.   Gastrointestinal:  Negative for abdominal pain, constipation, diarrhea, heartburn, hematochezia and nausea.   Breasts:  Negative for tenderness, breast mass and discharge.   Genitourinary:  Negative for dysuria, frequency, genital sores, hematuria, pelvic pain, urgency, vaginal bleeding and vaginal discharge.   Musculoskeletal:  Negative for arthralgias, joint swelling and myalgias.   Skin:  Negative for rash.   Neurological:  Negative for dizziness, weakness, headaches and paresthesias.   Psychiatric/Behavioral:  Negative for mood changes. The patient is not nervous/anxious.        OBJECTIVE:   /70 (BP Location: Right arm)   Pulse 96   " Temp 97.6  F (36.4  C) (Tympanic)   Resp 20   Ht 1.588 m (5' 2.5\")   Wt 75.3 kg (166 lb)   SpO2 97%   BMI 29.88 kg/m   Estimated body mass index is 29.88 kg/m  as calculated from the following:    Height as of this encounter: 1.588 m (5' 2.5\").    Weight as of this encounter: 75.3 kg (166 lb).  Physical Exam  GENERAL: healthy, alert and no distress  EYES: Eyes grossly normal to inspection, PERRL and conjunctivae and sclerae normal  HENT: ear canals and TM's normal, nose and mouth without ulcers or lesions  NECK: no adenopathy, no asymmetry, masses, or scars and thyroid normal to palpation  RESP: lungs clear to auscultation - no rales, rhonchi or wheezes  CV: regular rate and rhythm, normal S1 S2, no S3 or S4, no murmur, click or rub, no peripheral edema   ABDOMEN: soft, nontender, no hepatosplenomegaly, no masses and bowel sounds normal  MS: no gross musculoskeletal defects noted, no edema  SKIN: no suspicious lesions or rashes  NEURO: Normal strength and tone, mentation intact and speech normal  PSYCH: mentation appears normal, affect normal/bright  Diabetic foot exam: normal DP and PT pulses, normal sensory exam, normal monofilament exam, onychomycosis, and third toe with an unusual callous as below      Media Information    Document Information    Other: Photograph      11/14/2023 7:58 AM   Attached To:   Office Visit on 11/14/23 with Heather Chen MD   Source Information    Heather Chen MD  [facility]        Media Information    Document Information    Other: Photograph      11/14/2023 7:58 AM   Attached To:   Office Visit on 11/14/23 with Heather Chen MD   Source Information    Heather Chen MD  [facility]     ASSESSMENT / PLAN:   Mary was seen today for wellness visit.    Diagnoses and all orders for this visit:    Encounter for Medicare annual wellness exam    Type 2 diabetes mellitus without complication, without long-term current use of insulin (H)  Diet " controlled for now  -     HEMOGLOBIN A1C; Future  -     Lipid panel reflex to direct LDL Non-fasting; Future  -     Lipid panel reflex to direct LDL Non-fasting  -     HEMOGLOBIN A1C  -     CBC with platelets and differential; Future  -     Hepatic panel (Albumin, ALT, AST, Bili, Alk Phos, TP); Future  -     CBC with platelets and differential  -     Hepatic panel (Albumin, ALT, AST, Bili, Alk Phos, TP)  -     Dermatological Path Order and Indications; Standing  -     Dermatological Path Order and Indications    Essential hypertension with goal blood pressure less than 140/90  Controlled, a little low today, will monitor  -     BASIC METABOLIC PANEL; Future  -     BASIC METABOLIC PANEL  -     CBC with platelets and differential; Future  -     Hepatic panel (Albumin, ALT, AST, Bili, Alk Phos, TP); Future  -     CBC with platelets and differential  -     Hepatic panel (Albumin, ALT, AST, Bili, Alk Phos, TP)    Callus of foot  Offered to trim off callous, she agrees. Cut down with a 15 blade and sent for pathology as was somewhat atypical.      Media Information    Document Information    Other: Photograph      11/14/2023 8:08 AM   Attached To:   Office Visit on 11/14/23 with Heather Chen MD   Source Information    Heather Chen MD  [facility]     Onychomycosis  She asks to treat. DIscussed may be difficult but she would like to try. Gone over benefits and risks, as well as side effects of medication. LFTs today and 6 weeks.   -     terbinafine (LAMISIL) 250 MG tablet; Take 1 tablet (250 mg) by mouth daily for 90 days  Recommended have her nails done in [place] by Shamika Ross RN    Other orders  -     PRIMARY CARE FOLLOW-UP SCHEDULING; Future        Patient has been advised of split billing requirements and indicates understanding: Yes      COUNSELING:  Reviewed preventive health counseling, as reflected in patient instructions  Special attention given to:       Regular exercise       Healthy  diet/nutrition       Vision screening       Dental care       Bladder control       Fall risk prevention        She reports that she has never smoked. She has never used smokeless tobacco.      Appropriate preventive services were discussed with this patient, including applicable screening as appropriate for fall prevention, nutrition, physical activity, Tobacco-use cessation, weight loss and cognition.  Checklist reviewing preventive services available has been given to the patient.    Reviewed patients plan of care and provided an AVS. The Basic Care Plan (routine screening as documented in Health Maintenance) for Mary meets the Care Plan requirement. This Care Plan has been established and reviewed with the Patient.          Heather Michel MD  [facility]    Identified Health Risks:  I have reviewed Opioid Use Disorder and Substance Use Disorder risk factors and made any needed referrals.

## 2023-11-15 PROBLEM — B35.1 ONYCHOMYCOSIS: Status: ACTIVE | Noted: 2023-11-15

## 2023-11-16 LAB
PATH REPORT.COMMENTS IMP SPEC: NORMAL
PATH REPORT.FINAL DX SPEC: NORMAL
PATH REPORT.GROSS SPEC: NORMAL
PATH REPORT.MICROSCOPIC SPEC OTHER STN: NORMAL
PATH REPORT.RELEVANT HX SPEC: NORMAL

## 2024-02-09 ENCOUNTER — OFFICE VISIT (OUTPATIENT)
Dept: OBGYN | Facility: CLINIC | Age: 79
End: 2024-02-09
Payer: COMMERCIAL

## 2024-02-09 VITALS
SYSTOLIC BLOOD PRESSURE: 134 MMHG | HEART RATE: 81 BPM | DIASTOLIC BLOOD PRESSURE: 84 MMHG | RESPIRATION RATE: 18 BRPM | HEIGHT: 62 IN | WEIGHT: 164.8 LBS | BODY MASS INDEX: 30.33 KG/M2

## 2024-02-09 DIAGNOSIS — N95.2 VAGINAL ATROPHY: Primary | ICD-10-CM

## 2024-02-09 PROCEDURE — 99214 OFFICE O/P EST MOD 30 MIN: CPT | Performed by: OBSTETRICS & GYNECOLOGY

## 2024-02-09 RX ORDER — ESTRADIOL 0.5 MG/1
0.5 TABLET ORAL EVERY OTHER DAY
Qty: 30 TABLET | Refills: 4 | Status: SHIPPED | OUTPATIENT
Start: 2024-02-09 | End: 2024-04-09

## 2024-02-09 NOTE — NURSING NOTE
"Initial /84 (BP Location: Right arm, Patient Position: Sitting, Cuff Size: Adult Regular)   Pulse 81   Resp 18   Ht 1.581 m (5' 2.25\")   Wt 74.8 kg (164 lb 12.8 oz)   BMI 29.90 kg/m   Estimated body mass index is 29.9 kg/m  as calculated from the following:    Height as of this encounter: 1.581 m (5' 2.25\").    Weight as of this encounter: 74.8 kg (164 lb 12.8 oz). .    "

## 2024-02-15 ENCOUNTER — OFFICE VISIT (OUTPATIENT)
Dept: FAMILY MEDICINE | Facility: CLINIC | Age: 79
End: 2024-02-15
Payer: COMMERCIAL

## 2024-02-15 VITALS
RESPIRATION RATE: 16 BRPM | HEART RATE: 95 BPM | OXYGEN SATURATION: 97 % | WEIGHT: 162 LBS | DIASTOLIC BLOOD PRESSURE: 80 MMHG | HEIGHT: 63 IN | BODY MASS INDEX: 28.7 KG/M2 | TEMPERATURE: 97.8 F | SYSTOLIC BLOOD PRESSURE: 136 MMHG

## 2024-02-15 DIAGNOSIS — E11.9 TYPE 2 DIABETES MELLITUS WITHOUT COMPLICATION, WITHOUT LONG-TERM CURRENT USE OF INSULIN (H): Primary | ICD-10-CM

## 2024-02-15 DIAGNOSIS — I10 ESSENTIAL HYPERTENSION WITH GOAL BLOOD PRESSURE LESS THAN 140/90: ICD-10-CM

## 2024-02-15 DIAGNOSIS — B35.1 ONYCHOMYCOSIS: ICD-10-CM

## 2024-02-15 DIAGNOSIS — L84 CALLUS OF FOOT: ICD-10-CM

## 2024-02-15 LAB
ALBUMIN SERPL BCG-MCNC: 4.4 G/DL (ref 3.5–5.2)
ALP SERPL-CCNC: 104 U/L (ref 40–150)
ALT SERPL W P-5'-P-CCNC: 20 U/L (ref 0–50)
ANION GAP SERPL CALCULATED.3IONS-SCNC: 12 MMOL/L (ref 7–15)
AST SERPL W P-5'-P-CCNC: 27 U/L (ref 0–45)
BILIRUB DIRECT SERPL-MCNC: <0.2 MG/DL (ref 0–0.3)
BILIRUB SERPL-MCNC: 0.5 MG/DL
BUN SERPL-MCNC: 23.2 MG/DL (ref 8–23)
CALCIUM SERPL-MCNC: 9.9 MG/DL (ref 8.8–10.2)
CHLORIDE SERPL-SCNC: 102 MMOL/L (ref 98–107)
CREAT SERPL-MCNC: 0.7 MG/DL (ref 0.51–0.95)
DEPRECATED HCO3 PLAS-SCNC: 26 MMOL/L (ref 22–29)
EGFRCR SERPLBLD CKD-EPI 2021: 87 ML/MIN/1.73M2
GLUCOSE SERPL-MCNC: 137 MG/DL (ref 70–99)
HBA1C MFR BLD: 6.7 % (ref 0–5.6)
HOLD SPECIMEN: NORMAL
POTASSIUM SERPL-SCNC: 4.7 MMOL/L (ref 3.4–5.3)
PROT SERPL-MCNC: 7.6 G/DL (ref 6.4–8.3)
SODIUM SERPL-SCNC: 140 MMOL/L (ref 135–145)

## 2024-02-15 PROCEDURE — 11055 PARING/CUTG B9 HYPRKER LES 1: CPT | Performed by: FAMILY MEDICINE

## 2024-02-15 PROCEDURE — 82248 BILIRUBIN DIRECT: CPT | Performed by: FAMILY MEDICINE

## 2024-02-15 PROCEDURE — 83036 HEMOGLOBIN GLYCOSYLATED A1C: CPT | Performed by: FAMILY MEDICINE

## 2024-02-15 PROCEDURE — 36415 COLL VENOUS BLD VENIPUNCTURE: CPT | Performed by: FAMILY MEDICINE

## 2024-02-15 PROCEDURE — 80053 COMPREHEN METABOLIC PANEL: CPT | Performed by: FAMILY MEDICINE

## 2024-02-15 PROCEDURE — 99214 OFFICE O/P EST MOD 30 MIN: CPT | Mod: 25 | Performed by: FAMILY MEDICINE

## 2024-02-15 RX ORDER — RESPIRATORY SYNCYTIAL VIRUS VACCINE 120MCG/0.5
0.5 KIT INTRAMUSCULAR ONCE
Qty: 1 EACH | Refills: 0 | Status: CANCELLED | OUTPATIENT
Start: 2024-02-15 | End: 2024-02-15

## 2024-02-15 ASSESSMENT — PAIN SCALES - GENERAL: PAINLEVEL: NO PAIN (0)

## 2024-02-15 NOTE — PROGRESS NOTES
Assessment & Plan     Type 2 diabetes mellitus without complication, without long-term current use of insulin (H)  Diet controlled  - Basic metabolic panel  (Ca, Cl, CO2, Creat, Gluc, K, Na, BUN); Future  - Basic metabolic panel  (Ca, Cl, CO2, Creat, Gluc, K, Na, BUN)    Essential hypertension with goal blood pressure less than 140/90  Well controlled  Continue lisinopril hydrochlorothiazide     Onychomycosis  Responding to treatment   - Hepatic panel (Albumin, ALT, AST, Bili, Alk Phos, TP)  - Extra SST Tube (LAB USE ONLY)    Callus of foot  Had feet trimmed    Patient Instructions   See Jacob Alcazar in 6 months         Subjective   Mary is a 78 year old, presenting for the following health issues:  Diabetes      2/15/2024     9:48 AM   Additional Questions   Roomed by Aileen     Via the [facility] Maintenance [employer], the patient has reported the following services have been completed -Eye Exam, this information has been sent to the abstraction team.  History of Present Illness       Diabetes:   She presents for follow up of diabetes.    She is not checking blood glucose.         She has no concerns regarding her diabetes at this time.   She is not experiencing numbness or burning in feet, excessive thirst, blurry vision, weight changes or redness, sores or blisters on feet. The patient has had a diabetic eye exam in the last 12 months. Eye exam performed on 10/01/2023. Location of last eye exam [facility].        She eats 2-3 servings of fruits and vegetables daily.She consumes 0 sweetened beverage(s) daily.She exercises with enough effort to increase her heart rate 9 or less minutes per day.  She exercises with enough effort to increase her heart rate 3 or less days per week.   She is taking medications regularly.     diabetes   Diet controlled  Lab Results   Component Value Date    A1C 6.7 02/15/2024    A1C 6.7 11/14/2023    A1C 6.6 07/10/2023    A1C 6.6 10/31/2022    A1C 6.4 09/09/2021    A1C 6.8 07/16/2020  "   A1C 6.2 09/04/2019    A1C 6.2 09/26/2017    A1C 6.2 11/11/2013    A1C 6.3 12/17/2012        hypertension   On lisinopril hydrochlorothiazide   BP Readings from Last 6 Encounters:   02/15/24 136/80   02/09/24 134/84   11/14/23 104/70   11/10/23 (!) 146/92   09/28/23 120/66   08/04/23 127/79        Is seeing Shamika Ross, cutting her nails and callous  We treated onychomycosis, is done with treatment       Review of Systems  Constitutional, HEENT, cardiovascular, pulmonary, gi and gu systems are negative, except as otherwise noted.      Objective    /80 (BP Location: Right arm)   Pulse 95   Temp 97.8  F (36.6  C) (Tympanic)   Resp 16   Ht 1.6 m (5' 3\")   Wt 73.5 kg (162 lb)   LMP 02/15/1992   SpO2 97%   BMI 28.70 kg/m    Body mass index is 28.7 kg/m .  Physical Exam   GENERAL: alert and no distress  NECK: no adenopathy, no asymmetry, masses, or scars  RESP: lungs clear to auscultation - no rales, rhonchi or wheezes  CV: regular rate and rhythm, normal S1 S2, no S3 or S4, no murmur, click or rub, no peripheral edema  ABDOMEN: soft, nontender, no hepatosplenomegaly, no masses and bowel sounds normal  MS: no gross musculoskeletal defects noted, no edema  Foot exam: normal DP and PT pulses, normal sensory exam, normal monofilament exam, callous on 3rd distal toe that is well trimmed  Nails are growing out, up to mid nail is normal          Signed Electronically by: Heather Michel MD    "

## 2024-02-21 ENCOUNTER — OFFICE VISIT (OUTPATIENT)
Dept: URGENT CARE | Facility: URGENT CARE | Age: 79
End: 2024-02-21
Payer: COMMERCIAL

## 2024-02-21 VITALS
RESPIRATION RATE: 18 BRPM | BODY MASS INDEX: 29.05 KG/M2 | WEIGHT: 164 LBS | DIASTOLIC BLOOD PRESSURE: 64 MMHG | SYSTOLIC BLOOD PRESSURE: 111 MMHG | OXYGEN SATURATION: 97 % | HEART RATE: 74 BPM

## 2024-02-21 DIAGNOSIS — J02.0 STREPTOCOCCAL PHARYNGITIS: ICD-10-CM

## 2024-02-21 DIAGNOSIS — R07.0 THROAT PAIN: Primary | ICD-10-CM

## 2024-02-21 LAB — DEPRECATED S PYO AG THROAT QL EIA: POSITIVE

## 2024-02-21 PROCEDURE — 87880 STREP A ASSAY W/OPTIC: CPT | Performed by: FAMILY MEDICINE

## 2024-02-21 PROCEDURE — 99213 OFFICE O/P EST LOW 20 MIN: CPT | Performed by: FAMILY MEDICINE

## 2024-02-21 RX ORDER — AZITHROMYCIN 250 MG/1
TABLET, FILM COATED ORAL
Qty: 6 TABLET | Refills: 0 | Status: SHIPPED | OUTPATIENT
Start: 2024-02-21 | End: 2024-02-26

## 2024-02-21 NOTE — PROGRESS NOTES
(R07.0) Throat pain  (primary encounter diagnosis)  Comment:   Plan: Streptococcus A Rapid Screen w/Reflex to PCR -         Clinic Collect, azithromycin (ZITHROMAX) 250 MG        tablet            (J02.0) Streptococcal pharyngitis  Comment:   Plan: azithromycin (ZITHROMAX) 250 MG tablet              HISTORY    This patient has had a sore throat for a couple of days.    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  Was exposed to strep throat a couple of days ago.      REVIEW OF SYSTEMS    Fevers.  No ear pain.  No cough or short of breath.  No nausea or vomiting.  No rash.        EXAM  /64   Pulse 74   Resp 18   Wt 74.4 kg (164 lb)   LMP 02/15/1992   SpO2 97%   BMI 29.05 kg/m      Conjunctiva clear.  Pharynx mildly red without swelling or ulceration.  No significant cervical adenopathy.  No cough or labored breathing.    Results for orders placed or performed in visit on 02/21/24   Streptococcus A Rapid Screen w/Reflex to PCR - Clinic Collect     Status: Abnormal    Specimen: Throat; Swab   Result Value Ref Range    Group A Strep antigen Positive (A) Negative

## 2024-04-12 ENCOUNTER — OFFICE VISIT (OUTPATIENT)
Dept: OBGYN | Facility: CLINIC | Age: 79
End: 2024-04-12
Payer: COMMERCIAL

## 2024-04-12 VITALS
DIASTOLIC BLOOD PRESSURE: 88 MMHG | SYSTOLIC BLOOD PRESSURE: 151 MMHG | HEIGHT: 63 IN | BODY MASS INDEX: 29.16 KG/M2 | RESPIRATION RATE: 18 BRPM | WEIGHT: 164.6 LBS | HEART RATE: 77 BPM

## 2024-04-12 DIAGNOSIS — N81.10 VAGINAL PROLAPSE: Primary | ICD-10-CM

## 2024-04-12 PROCEDURE — 99213 OFFICE O/P EST LOW 20 MIN: CPT | Performed by: OBSTETRICS & GYNECOLOGY

## 2024-04-12 NOTE — PROGRESS NOTES
[facility]    Mary is a 77 year old   female who presents for pessary recheck;  she currently has a ring with support pessary which she keeps in place continuously. Denies vaginal bleeding, but reports isome discharge. She is using vaginal estrogen every other day for the past two months.      All systems were reviewed and pertinent information in noted in subjective/HPI.     Past Medical History        Past Medical History:   Diagnosis Date    HTN                 Past Surgical History         Past Surgical History:   Procedure Laterality Date    HYSTERECTOMY TOTAL ABDOMINAL        Total abdominal hysterectomy & bilateral salpingo-oophorectomy    KNEE SURGERY        Knee (L)           Current Medications      Current Outpatient Medications:     acetaminophen (TYLENOL) 325 MG tablet, Take 325-650 mg by mouth every 6 hours as needed for mild pain, Disp: , Rfl:     aspirin 81 MG tablet, Take 1 tablet by mouth daily. , Disp: , Rfl:     CENTRUM SILVER OR TABS, ONE DAILY, Disp: 90 Tab, Rfl: 3    CRANBERRY, , Disp: , Rfl:     estradiol (ESTRACE) 0.5 MG tablet, Take 1 tablet (0.5 mg) by mouth every other day for 60 days, Disp: 30 tablet, Rfl: 4    lisinopril-hydrochlorothiazide (ZESTORETIC) 10-12.5 MG tablet, TAKE ONE TABLET BY MOUTH ONCE DAILY, Disp: 90 tablet, Rfl: 3    VITAMIN C 500 MG OR TABS, ONE TABLET DAILY, Disp: 3 MONTHS, Rfl: 3    VITAMIN D PO, Take 1 tablet by mouth daily., Disp: , Rfl:     terbinafine (LAMISIL) 250 MG tablet, Take 1 tablet (250 mg) by mouth daily for 90 days (Patient not taking: Reported on 2024), Disp: 90 tablet, Rfl: 0     Current Facility-Administered Medications:     hylan (SYNVISC ONE) injection 48 mg, 48 mg, , , Daniel Feliciano MD, 48 mg at 19 0805    hylan (SYNVISC ONE) injection 48 mg, 48 mg, , , Daniel Feliciano MD, 48 mg at 10/21/19 0900         Social History                Socioeconomic History    Marital status:       "  Spouse name: None    Number of children: None    Years of education: None    Highest education level: None   Occupational History    None   Tobacco Use    Smoking status: Never Smoker    Smokeless tobacco: Never Used   Vaping Use    Vaping Use: Never used   Substance and Sexual Activity    Alcohol use: No    Drug use: No    Sexual activity: Yes       Partners: Male   Other Topics Concern     Service No    Blood Transfusions No    Caffeine Concern No       Comment: 0--2 cups    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 No   Social History Narrative    None      Social Determinants of Health      Financial Resource Strain: Not on file   Food Insecurity: Not on file   Transportation Needs: Not on file   Physical Activity: Not on file   Stress: Not on file   Social Connections: Not on file   Intimate Partner Violence: Not on file   Housing Stability: Not on file         Family History         Family History   Problem Relation Age of Onset    Hypertension Mother      Hypertension Father      Diabetes Father      Hypertension Sister      Hypertension Brother      C.A.D. No family hx of              OBJECTIVE: BP (!) 151/88 (BP Location: Left arm, Patient Position: Chair, Cuff Size: Adult Regular)   Pulse 77   Resp 18   Ht 1.6 m (5' 3\")   Wt 74.7 kg (164 lb 9.6 oz)   LMP 02/15/1992   BMI 29.16 kg/m    External vulva with mod vulvovaginal atrophy. Very superficial abrasion on the posterior aspect of the vaginal cuff.     ASSESSMENT:       ICD-10-CM     1. Pessary maintenance  Z46.89            PLAN: RETURN TO CLINIC 3 months for close monitoring of this erosion.  "   Con every other day vaginal estrace tablet until return visit in July then plan to decrease assuming erosion is stable/resolved.      Rona Vargas MD  OB/GYN

## 2024-04-12 NOTE — PROGRESS NOTES
"Initial BP (!) 151/88 (BP Location: Left arm, Patient Position: Chair, Cuff Size: Adult Regular)   Pulse 77   Resp 18   Ht 1.6 m (5' 3\")   Wt 74.7 kg (164 lb 9.6 oz)   LMP 02/15/1992   BMI 29.16 kg/m   Estimated body mass index is 29.16 kg/m  as calculated from the following:    Height as of this encounter: 1.6 m (5' 3\").    Weight as of this encounter: 74.7 kg (164 lb 9.6 oz). .  "

## 2024-05-13 ENCOUNTER — OFFICE VISIT (OUTPATIENT)
Dept: PODIATRY | Facility: CLINIC | Age: 79
End: 2024-05-13
Payer: COMMERCIAL

## 2024-05-13 VITALS
BODY MASS INDEX: 29.06 KG/M2 | SYSTOLIC BLOOD PRESSURE: 154 MMHG | WEIGHT: 164 LBS | DIASTOLIC BLOOD PRESSURE: 93 MMHG | HEIGHT: 63 IN | HEART RATE: 88 BPM

## 2024-05-13 DIAGNOSIS — L97.519 TYPE 2 DIABETES MELLITUS WITH RIGHT DIABETIC FOOT ULCER (H): ICD-10-CM

## 2024-05-13 DIAGNOSIS — M20.42 ACQUIRED BILATERAL HAMMER TOES: ICD-10-CM

## 2024-05-13 DIAGNOSIS — E11.621 TYPE 2 DIABETES MELLITUS WITH RIGHT DIABETIC FOOT ULCER (H): ICD-10-CM

## 2024-05-13 DIAGNOSIS — E11.43 TYPE II DIABETES MELLITUS WITH PERIPHERAL AUTONOMIC NEUROPATHY (H): ICD-10-CM

## 2024-05-13 DIAGNOSIS — L84 CALLUS OF FOOT: Primary | ICD-10-CM

## 2024-05-13 DIAGNOSIS — M20.41 ACQUIRED BILATERAL HAMMER TOES: ICD-10-CM

## 2024-05-13 PROCEDURE — 99203 OFFICE O/P NEW LOW 30 MIN: CPT | Mod: 25 | Performed by: PODIATRIST

## 2024-05-13 PROCEDURE — 11042 DBRDMT SUBQ TIS 1ST 20SQCM/<: CPT | Performed by: PODIATRIST

## 2024-05-13 NOTE — LETTER
5/13/2024         RE: Mary Art  [address]  [facility] [id number]        Dear Colleague,    Thank you for referring your patient, Mary Art, to the [facility] [place]. Please see a copy of my visit note below.    PATIENT HISTORY:  Mary Art is a 78 year old female who presents to clinic for a diabetic foot evaluation.  The patient relates developing a sore on the tip of the third toe on the right foot. The patient relates some numbness to the feet.   The patient relates blood sugars are within normal limits.         REVIEW OF SYSTEMS:  Constitutional, HEENT, cardiovascular, pulmonary, GI, , musculoskeletal, neuro, skin, endocrine and psych systems are negative, except as otherwise noted.     PAST MEDICAL HISTORY:   Past Medical History:   Diagnosis Date     HTN         PAST SURGICAL HISTORY:   Past Surgical History:   Procedure Laterality Date     HYSTERECTOMY TOTAL ABDOMINAL  1991    Total abdominal hysterectomy & bilateral salpingo-oophorectomy     KNEE SURGERY  2003    Knee (L)        MEDICATIONS:   Current Outpatient Medications:      acetaminophen (TYLENOL) 325 MG tablet, Take 325-650 mg by mouth every 6 hours as needed for mild pain, Disp: , Rfl:      aspirin 81 MG tablet, Take 1 tablet by mouth daily. , Disp: , Rfl:      CENTRUM SILVER OR TABS, ONE DAILY, Disp: 90 Tab, Rfl: 3     CRANBERRY, , Disp: , Rfl:      lisinopril-hydrochlorothiazide (ZESTORETIC) 10-12.5 MG tablet, TAKE ONE TABLET BY MOUTH ONCE DAILY, Disp: 90 tablet, Rfl: 3     VITAMIN C 500 MG OR TABS, ONE TABLET DAILY, Disp: 3 MONTHS, Rfl: 3     VITAMIN D PO, Take 1 tablet by mouth daily., Disp: , Rfl:      estradiol (ESTRACE) 0.5 MG tablet, Take 1 tablet (0.5 mg) by mouth every other day for 60 days, Disp: 30 tablet, Rfl: 4    Current Facility-Administered Medications:      hylan (SYNVISC ONE) injection 48 mg, 48 mg, , , Daniel Feliciano MD, 48 mg at 11/04/19 0805     hylan (SYNVISC ONE)  injection 48 mg, 48 mg, , , Daniel Feliciano MD, 48 mg at 10/21/19 0900     ALLERGIES:    Allergies   Allergen Reactions     Keflex [Cephalexin Monohydrate] Rash     Pcn [Penicillins] Hives        SOCIAL HISTORY:   Social History     Socioeconomic History     Marital status:      Spouse name: Not on file     Number of children: Not on file     Years of education: Not on file     Highest education level: Not on file   Occupational History     Not on file   Tobacco Use     Smoking status: Never     Smokeless tobacco: Never   Vaping Use     Vaping status: Never Used   Substance and Sexual Activity     Alcohol use: No     Drug use: No     Sexual activity: Yes     Partners: Male   Other Topics Concern      Service No     Blood Transfusions No     Caffeine Concern No     Comment: 0--2 cups     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 No   Social History Narrative     Not on file     Social Determinants of Health     Financial Resource Strain: Low Risk  (11/14/2023)    Financial Resource Strain      Within the past 12 months, have you or your family members you live with been unable to get utilities (heat, electricity) when it was really needed?: No   Food Insecurity: Low Risk  (11/14/2023)    Food Insecurity      Within the past 12 months, did you worry that your food would run out before you got money to buy more?: No      Within the past 12 months, did the food you bought just not last and you didn t have money to get more?: No   Transportation Needs: Low Risk  (11/14/2023)    Transportation Needs      Within the past 12 months, has lack of transportation  "kept you from medical appointments, getting your medicines, non-medical meetings or appointments, work, or from getting things that you need?: No   Physical Activity: Not on file   Stress: Not on file   Social Connections: Not on file   Interpersonal Safety: Low Risk  (11/14/2023)    Interpersonal Safety      Do you feel physically and emotionally safe where you currently live?: Yes      Within the past 12 months, have you been hit, slapped, kicked or otherwise physically hurt by someone?: No      Within the past 12 months, have you been humiliated or emotionally abused in other ways by your partner or ex-partner?: No   Housing Stability: Low Risk  (11/14/2023)    Housing Stability      Do you have housing? : Yes      Are you worried about losing your housing?: No        FAMILY HISTORY:   Family History   Problem Relation Age of Onset     Hypertension Mother      Hypertension Father      Diabetes Father      Hypertension Sister      Hypertension Brother      C.A.D. No family hx of         Vitals: BP (!) 154/93   Pulse 88   Ht 1.6 m (5' 3\")   Wt 74.4 kg (164 lb)   LMP 02/15/1992   BMI 29.05 kg/m         Lower Extremity Evaluation:     Dermatologic: Skin is intact to both lower extremities without significant lesions, rash or abrasion.  No paronychia or evidence of soft tissue infection is noted.  The nails are hypertrophic and discolored bilateral feet.     Vascular: DP & PT pulses are intact & regular bilaterally.   Capillary filling and skin temperature is normal to both lower extremities.  There are no varicosities, no edema and no trophic changes noted.      Neurologic:   No evidence of weakness in the lower extremities.  Noted evidence of neuropathy with diminished sensation bilaterally.       Musculoskeletal: Patient is ambulatory without assistive device or brace.  No gross ankle deformity noted.  No foot or ankle joint effusion is noted.  One notes hammertoe contracture of the lesser toes " bilaterally.           Assessment:        ICD-10-CM    1. Callus of foot  L84       2. Acquired bilateral hammer toes  M20.41     M20.42       3. Type II diabetes mellitus with peripheral autonomic neuropathy (H)  E11.43               Plan:  I have explained to the patient the underlying condition affecting the feet.  At this point, after verbal consent, the ulcer was cleansed with betadine.  Next, excisional debridement was performed on the ulcer utilizing a #10 blade down to and including subcutaneous tissue. Bleeding was controlled with light pressure.   No drainage noted.  No anesthesia was used due to underlying neuropathy.  The patient tolerated procedure well.    There is low risk of morbidity with the procedure.  There was no overlap in work associated with the evaluation/management and the work associated with the procedure. The patient was given information on local certified foot care nursing services that can provide routine nail care.    I have discussed with the patient the importance of diabetic foot care and daily inspection of the feet.  The patient was instructed to come in anytime if there is any concern about the feet with redness, swelling or drainage is noted.    Mary verbalized agreement with and understanding of the rational for the diagnosis and treatment plan.  All questions were answered to best of my ability and the patient's satisfaction. The patient was advised to contact the clinic with any questions that may arise after the clinic visit.      Disclaimer: This note consists of symbols derived from keyboarding, dictation and/or voice recognition software. As a result, there may be errors in the script that have gone undetected. Please consider this when interpreting information found in this chart.        YAHIR Barrett.P.M., F.A.C.F.A.S.          Again, thank you for allowing me to participate in the care of your patient.        Sincerely,        Neto Alvarado DPM

## 2024-05-13 NOTE — PROGRESS NOTES
PATIENT HISTORY:  Mary Art is a 78 year old female who presents to clinic for a diabetic foot evaluation.  The patient relates developing a sore on the tip of the third toe on the right foot. The patient relates some numbness to the feet.   The patient relates blood sugars are within normal limits.         REVIEW OF SYSTEMS:  Constitutional, HEENT, cardiovascular, pulmonary, GI, , musculoskeletal, neuro, skin, endocrine and psych systems are negative, except as otherwise noted.     PAST MEDICAL HISTORY:   Past Medical History:   Diagnosis Date    HTN         PAST SURGICAL HISTORY:   Past Surgical History:   Procedure Laterality Date    HYSTERECTOMY TOTAL ABDOMINAL  1991    Total abdominal hysterectomy & bilateral salpingo-oophorectomy    KNEE SURGERY  2003    Knee (L)        MEDICATIONS:   Current Outpatient Medications:     acetaminophen (TYLENOL) 325 MG tablet, Take 325-650 mg by mouth every 6 hours as needed for mild pain, Disp: , Rfl:     aspirin 81 MG tablet, Take 1 tablet by mouth daily. , Disp: , Rfl:     CENTRUM SILVER OR TABS, ONE DAILY, Disp: 90 Tab, Rfl: 3    CRANBERRY, , Disp: , Rfl:     lisinopril-hydrochlorothiazide (ZESTORETIC) 10-12.5 MG tablet, TAKE ONE TABLET BY MOUTH ONCE DAILY, Disp: 90 tablet, Rfl: 3    VITAMIN C 500 MG OR TABS, ONE TABLET DAILY, Disp: 3 MONTHS, Rfl: 3    VITAMIN D PO, Take 1 tablet by mouth daily., Disp: , Rfl:     estradiol (ESTRACE) 0.5 MG tablet, Take 1 tablet (0.5 mg) by mouth every other day for 60 days, Disp: 30 tablet, Rfl: 4    Current Facility-Administered Medications:     hylan (SYNVISC ONE) injection 48 mg, 48 mg, , , Daniel Feliciano MD, 48 mg at 11/04/19 0805    hylan (SYNVISC ONE) injection 48 mg, 48 mg, , , Daniel Feliciano MD, 48 mg at 10/21/19 0900     ALLERGIES:    Allergies   Allergen Reactions    Keflex [Cephalexin Monohydrate] Rash    Pcn [Penicillins] Hives        SOCIAL HISTORY:   Social History     Socioeconomic History    Marital status:       Spouse name: Not on file    Number of children: Not on file    Years of education: Not on file    Highest education level: Not on file   Occupational History    Not on file   Tobacco Use    Smoking status: Never    Smokeless tobacco: Never   Vaping Use    Vaping status: Never Used   Substance and Sexual Activity    Alcohol use: No    Drug use: No    Sexual activity: Yes     Partners: Male   Other Topics Concern     Service No    Blood Transfusions No    Caffeine Concern No     Comment: 0--2 cups    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 No   Social History Narrative    Not on file     Social Determinants of Health     Financial Resource Strain: Low Risk  (11/14/2023)    Financial Resource Strain     Within the past 12 months, have you or your family members you live with been unable to get utilities (heat, electricity) when it was really needed?: No   Food Insecurity: Low Risk  (11/14/2023)    Food Insecurity     Within the past 12 months, did you worry that your food would run out before you got money to buy more?: No     Within the past 12 months, did the food you bought just not last and you didn t have money to get more?: No   Transportation Needs: Low Risk  (11/14/2023)    Transportation Needs     Within the past 12 months, has lack of transportation kept you from medical appointments, getting your medicines, non-medical meetings or appointments, work, or from getting things that you need?: No   Physical Activity: Not on file   Stress: Not on file   Social Connections: Not on file   Interpersonal Safety: Low Risk  (11/14/2023)    Interpersonal Safety      "Do you feel physically and emotionally safe where you currently live?: Yes     Within the past 12 months, have you been hit, slapped, kicked or otherwise physically hurt by someone?: No     Within the past 12 months, have you been humiliated or emotionally abused in other ways by your partner or ex-partner?: No   Housing Stability: Low Risk  (11/14/2023)    Housing Stability     Do you have housing? : Yes     Are you worried about losing your housing?: No        FAMILY HISTORY:   Family History   Problem Relation Age of Onset    Hypertension Mother     Hypertension Father     Diabetes Father     Hypertension Sister     Hypertension Brother     C.A.D. No family hx of         Vitals: BP (!) 154/93   Pulse 88   Ht 1.6 m (5' 3\")   Wt 74.4 kg (164 lb)   LMP 02/15/1992   BMI 29.05 kg/m         Lower Extremity Evaluation:     Dermatologic: Skin is intact to both lower extremities without significant lesions, rash or abrasion.  No paronychia or evidence of soft tissue infection is noted.  The nails are hypertrophic and discolored bilateral feet.     Vascular: DP & PT pulses are intact & regular bilaterally.   Capillary filling and skin temperature is normal to both lower extremities.  There are no varicosities, no edema and no trophic changes noted.      Neurologic:   No evidence of weakness in the lower extremities.  Noted evidence of neuropathy with diminished sensation bilaterally.       Musculoskeletal: Patient is ambulatory without assistive device or brace.  No gross ankle deformity noted.  No foot or ankle joint effusion is noted.  One notes hammertoe contracture of the lesser toes bilaterally.           Assessment:        ICD-10-CM    1. Callus of foot  L84       2. Acquired bilateral hammer toes  M20.41     M20.42       3. Type II diabetes mellitus with peripheral autonomic neuropathy (H)  E11.43               Plan:  I have explained to the patient the underlying condition affecting the feet.  At this point, " after verbal consent, the ulcer was cleansed with betadine.  Next, excisional debridement was performed on the ulcer utilizing a #10 blade down to and including subcutaneous tissue. Bleeding was controlled with light pressure.   No drainage noted.  No anesthesia was used due to underlying neuropathy.  The patient tolerated procedure well.    There is low risk of morbidity with the procedure.  There was no overlap in work associated with the evaluation/management and the work associated with the procedure. The patient was given information on local certified foot care nursing services that can provide routine nail care.    I have discussed with the patient the importance of diabetic foot care and daily inspection of the feet.  The patient was instructed to come in anytime if there is any concern about the feet with redness, swelling or drainage is noted.    Mary verbalized agreement with and understanding of the rational for the diagnosis and treatment plan.  All questions were answered to best of my ability and the patient's satisfaction. The patient was advised to contact the clinic with any questions that may arise after the clinic visit.      Disclaimer: This note consists of symbols derived from keyboarding, dictation and/or voice recognition software. As a result, there may be errors in the script that have gone undetected. Please consider this when interpreting information found in this chart.        KATHLEEN Alvarado D.P.M., F.CHEKO.C.F.A.S.

## 2024-05-13 NOTE — NURSING NOTE
"Chief Complaint   Patient presents with    Consult     Pre-ulcer of callus on second digit, right foot       Initial BP (!) 154/93   Pulse 88   Ht 1.6 m (5' 3\")   Wt 74.4 kg (164 lb)   LMP 02/15/1992   BMI 29.05 kg/m   Estimated body mass index is 29.05 kg/m  as calculated from the following:    Height as of this encounter: 1.6 m (5' 3\").    Weight as of this encounter: 74.4 kg (164 lb).  Medications and allergies reviewed.      Jazlyn SINCLAIR MA    "

## 2024-05-13 NOTE — PATIENT INSTRUCTIONS
Dr. Jalloh's Gel Toe Cap  Size: Mini  Quantity: two boxes (4 caps)    [facility]  [address] [place] [id number]  Contact  Phone:  [phone]    [facility]  [address] [place] [address]  Contact  Phone:  [phone]    [facility]  [address] [facility] [place] [address]  Contact  Phone:  [phone]

## 2024-07-19 ENCOUNTER — OFFICE VISIT (OUTPATIENT)
Dept: OBGYN | Facility: CLINIC | Age: 79
End: 2024-07-19
Payer: COMMERCIAL

## 2024-07-19 VITALS
DIASTOLIC BLOOD PRESSURE: 81 MMHG | SYSTOLIC BLOOD PRESSURE: 134 MMHG | WEIGHT: 165 LBS | RESPIRATION RATE: 16 BRPM | BODY MASS INDEX: 29.23 KG/M2 | HEART RATE: 75 BPM | HEIGHT: 63 IN

## 2024-07-19 DIAGNOSIS — Z46.89 PESSARY MAINTENANCE: Primary | ICD-10-CM

## 2024-07-19 PROCEDURE — 99213 OFFICE O/P EST LOW 20 MIN: CPT | Performed by: OBSTETRICS & GYNECOLOGY

## 2024-07-19 NOTE — PROGRESS NOTES
"Initial /81 (BP Location: Left arm, Patient Position: Chair, Cuff Size: Adult Regular)   Pulse 75   Resp 16   Ht 1.6 m (5' 3\")   Wt 74.8 kg (165 lb)   LMP 02/15/1992   BMI 29.23 kg/m   Estimated body mass index is 29.23 kg/m  as calculated from the following:    Height as of this encounter: 1.6 m (5' 3\").    Weight as of this encounter: 74.8 kg (165 lb). .  "

## 2024-07-19 NOTE — PROGRESS NOTES
[facility]     Mary is a 78 year old   female who presents for pessary recheck;  she currently has a ring with support pessary which she keeps in place continuously. Having brown discharge. Did increased dosing of vaginal estrogen for two months, then decreased to twice a week. Didn't notice much of a change in her discharge with this tinkering.      All systems were reviewed and pertinent information in noted in subjective/HPI.     Past Medical History           Past Medical History:   Diagnosis Date    HTN                 Past Surgical History             Past Surgical History:   Procedure Laterality Date    HYSTERECTOMY TOTAL ABDOMINAL        Total abdominal hysterectomy & bilateral salpingo-oophorectomy    KNEE SURGERY        Knee (L)            Current Medications      Current Outpatient Medications:     acetaminophen (TYLENOL) 325 MG tablet, Take 325-650 mg by mouth every 6 hours as needed for mild pain, Disp: , Rfl:     aspirin 81 MG tablet, Take 1 tablet by mouth daily. , Disp: , Rfl:     CENTRUM SILVER OR TABS, ONE DAILY, Disp: 90 Tab, Rfl: 3    CRANBERRY, , Disp: , Rfl:     estradiol (ESTRACE) 0.5 MG tablet, Take 1 tablet (0.5 mg) by mouth every other day for 60 days, Disp: 30 tablet, Rfl: 4    lisinopril-hydrochlorothiazide (ZESTORETIC) 10-12.5 MG tablet, TAKE ONE TABLET BY MOUTH ONCE DAILY, Disp: 90 tablet, Rfl: 3    VITAMIN C 500 MG OR TABS, ONE TABLET DAILY, Disp: 3 MONTHS, Rfl: 3    VITAMIN D PO, Take 1 tablet by mouth daily., Disp: , Rfl:     terbinafine (LAMISIL) 250 MG tablet, Take 1 tablet (250 mg) by mouth daily for 90 days (Patient not taking: Reported on 2024), Disp: 90 tablet, Rfl: 0     Current Facility-Administered Medications:     hylan (SYNVISC ONE) injection 48 mg, 48 mg, , , Daniel Feliciano MD, 48 mg at 19 0805    hylan (SYNVISC ONE) injection 48 mg, 48 mg, , , Daniel Feliciano MD, 48 mg at 10/21/19 0900         Social  "History                Socioeconomic History    Marital status:        Spouse name: None    Number of children: None    Years of education: None    Highest education level: None   Occupational History    None   Tobacco Use    Smoking status: Never Smoker    Smokeless tobacco: Never Used   Vaping Use    Vaping Use: Never used   Substance and Sexual Activity    Alcohol use: No    Drug use: No    Sexual activity: Yes       Partners: Male   Other Topics Concern     Service No    Blood Transfusions No    Caffeine Concern No       Comment: 0--2 cups    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 No   Social History Narrative    None      Social Determinants of Health      Financial Resource Strain: Not on file   Food Insecurity: Not on file   Transportation Needs: Not on file   Physical Activity: Not on file   Stress: Not on file   Social Connections: Not on file   Intimate Partner Violence: Not on file   Housing Stability: Not on file         Family History             Family History   Problem Relation Age of Onset    Hypertension Mother      Hypertension Father      Diabetes Father      Hypertension Sister      Hypertension Brother      C.A.D. No family hx of              OBJECTIVE: /81 (BP Location: Left arm, Patient Position: Chair, Cuff Size: Adult Regular)   Pulse 75   Resp 16   Ht 1.6 m (5' 3\")   Wt 74.8 kg (165 lb)   LMP 02/15/1992   BMI 29.23 kg/m      External vulva with mod vulvovaginal atrophy. Very superficial abrasion across the vaginal cuff.      ASSESSMENT:       ICD-10-CM     1. Pessary maintenance  Z46.89            PLAN: RETURN TO CLINIC " 2 months for close monitoring of this erosion. Pt not interested in pessary break, so needs close follow up to monitor this abrasion.   Con vaginal estrace tablet.      Rona Vargas MD  OB/GYN

## 2024-08-30 NOTE — PROGRESS NOTES
Mary is a 73 year old   female who presents for pessary recheck;  she currently has a ring with support pessary which she keeps in place continuously;  she reports no problems with her pessary, no discharge, no bleeding.. She is using vaginal estrogen once a week.    All systems were reviewed and pertinent information in noted in subjective/HPI.    Past Medical History:   Diagnosis Date     HTN        Past Surgical History:   Procedure Laterality Date     HYSTERECTOMY TOTAL ABDOMINAL      Total abdominal hysterectomy & bilateral salpingo-oophorectomy     KNEE SURGERY      Knee (L)       Current Outpatient Medications   Medication Sig Dispense Refill     aspirin 81 MG tablet Take 1 tablet by mouth daily.        CENTRUM SILVER OR TABS ONE DAILY 90 Tab 3     CRANBERRY        lisinopril-hydrochlorothiazide (PRINZIDE/ZESTORETIC) 10-12.5 MG tablet Take 1 tablet by mouth daily 90 tablet 3     VITAMIN C 500 MG OR TABS ONE TABLET DAILY 3 MONTHS 3     VITAMIN D PO Take 1 tablet by mouth daily.       estradiol (ESTRACE) 0.5 MG tablet Place 1 tablet in the vagina once weekly. (Patient not taking: Reported on 2019) 12 tablet 3       Social History     Socioeconomic History     Marital status:      Spouse name: None     Number of children: None     Years of education: None     Highest education level: None   Occupational History     None   Social Needs     Financial resource strain: None     Food insecurity:     Worry: None     Inability: None     Transportation needs:     Medical: None     Non-medical: None   Tobacco Use     Smoking status: Never Smoker     Smokeless tobacco: Never Used   Substance and Sexual Activity     Alcohol use: No     Drug use: No     Sexual activity: Yes     Partners: Male   Lifestyle     Physical activity:     Days per week: None     Minutes per session: None     Stress: None   Relationships     Social connections:     Talks on phone: None     Gets together: None     Attends  Physical Therapy Discharge      Visit Type: Daily Treatment Note- Discharge Summary  Visit: 17  Referring Provider: Maribel La MD  Medical Diagnosis (from order): R29.6 - Recurrent falls     SUBJECTIVE                                                                                                               Feels she has made very good progress in therapy and ready to work with exercises on her own.   Does report that she fell Wednesday - she was trying bring a cushion in before the rain came - threw the cushion forward and she went backwards.  She was able to get her self up.    She did end up going to the hospital secondary to medication she was on.  No injuries   Current Functional Limitations: See LEFS data       OBJECTIVE                                                                                                                                     Outcome/Assessments  Outcome Measures:   Lower Extremity Functional Scale: LEFS Calculated Total: 48 (0=extreme difficulty; 80=no difficulty) see flowsheet for additional documentation        Treatment     Therapeutic Exercise  Bike for 10 minutes   Standing exercise   Heel raises   Hip abduction   Hip Extension   Mini squat   Step ups   Discussed continuing exercises for strengthening and walking on the treadmill at home               Neuromuscular Re-Education        Skilled input: verbal instruction/cues, tactile instruction/cues, posture correction and demonstration    Writer verbally educated and received verbal consent for hand placement, positioning of patient, and techniques to be performed today from patient for clothing adjustments for techniques, hand placement and palpation for techniques and therapist position for techniques as described above and how they are pertinent to the patient's plan of care.  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 No   Social History Narrative     None       Family History   Problem Relation Age of Onset     Hypertension Mother      Hypertension Father      Diabetes Father      Hypertension Sister      Hypertension Brother      C.A.D. No family hx of        OBJECTIVE: /71 (BP Location: Right arm, Patient Position: Chair, Cuff Size: Adult Large)   Pulse 80   Temp 97.9  F (36.6  C) (Tympanic)   Resp 18   Ht 1.607 m (5' 3.25\")   Wt 76.7 kg (169 lb)   BMI 29.70 kg/m    No LMP recorded. Patient has had a hysterectomy.  The pessary was removed without difficulty, cleaned in warm water and then replaced, after inspection of the vulva and vagina, which showed no erosion or excess leukorrhea.    ASSESSMENT:     ICD-10-CM    1. Pessary maintenance Z46.89          PLAN: Follow-up 3-6 months for pessary maintenance; continue weekly Estradiol DIXIE Jeff MD      " Paula    Exercises  - Seated March  - 1-2 x daily - 5-6 x weekly - 2-3 sets - 10-15 reps  - Supine Posterior Pelvic Tilt  - 1 x daily - 7 x weekly - 3 sets - 10 reps  - Seated Long Arc Quad  - 1 x daily - 7 x weekly - 3 sets - 10 reps  - Supine March  - 1-2 x daily - 7 x weekly - 1-2 sets - 10 reps - 5-10  hold  - Supine Lower Trunk Rotation  - 1-2 x daily - 7 x weekly - 1-2 sets - 10 reps - 5-10  hold  - Seated Piriformis Stretch  - 1 x daily - 7 x weekly - 3 sets - 10 reps  - Seated Hamstring Stretch  - 1 x daily - 7 x weekly - 3 sets - 10 reps      ASSESSMENT                                                                                                          To date the patient has made gains as expected.    Patient has made very progress with PT.  She has progressed from walking with a walker to a cane and has returned to activities in her house.   Education:   - Results of above outlined education: Verbalizes understanding and Demonstrates understanding    PLAN                                                                                                                           Discharge from skilled therapy with instructions/recommendations to: continue home exercise program, follow up with referring provider    Suggestions for next session as indicated: Discharge to Independent home exercise program      Goals  Long Term Goals: to be met by end of plan of care  1. 1. Patient will complete single leg stance for 10 seconds without loss of balance for safe lower body dressing. Status: met   2. Activities-Specific Balance Confidence Scale: Patient will complete form to reflect an improved score to greater than or equal to 75 to indicate patient reported improvement in balance during functional mobility.  (minimal clinically important difference: 18%) Status: met   3. Patient will ambulate for 30 minutes without reported pain and without reported difficulty least restrictive device for safe household  ambulation.  Status: met       Therapy procedure time and total treatment time can be found documented on the Time Entry flowsheet

## 2024-09-13 ENCOUNTER — OFFICE VISIT (OUTPATIENT)
Dept: OBGYN | Facility: CLINIC | Age: 79
End: 2024-09-13
Payer: COMMERCIAL

## 2024-09-13 VITALS
DIASTOLIC BLOOD PRESSURE: 77 MMHG | HEIGHT: 63 IN | TEMPERATURE: 96.3 F | BODY MASS INDEX: 29.23 KG/M2 | HEART RATE: 80 BPM | RESPIRATION RATE: 14 BRPM | SYSTOLIC BLOOD PRESSURE: 141 MMHG

## 2024-09-13 DIAGNOSIS — Z46.89 PESSARY MAINTENANCE: Primary | ICD-10-CM

## 2024-09-13 PROCEDURE — 99213 OFFICE O/P EST LOW 20 MIN: CPT | Performed by: OBSTETRICS & GYNECOLOGY

## 2024-09-13 NOTE — PROGRESS NOTES
"Initial BP (!) 141/77 (BP Location: Right arm, Patient Position: Chair, Cuff Size: Adult Regular)   Pulse 80   Temp (!) 96.3  F (35.7  C) (Tympanic)   Resp 14   Ht 1.6 m (5' 3\")   LMP 02/15/1992   BMI 29.23 kg/m   Estimated body mass index is 29.23 kg/m  as calculated from the following:    Height as of this encounter: 1.6 m (5' 3\").    Weight as of 7/19/24: 74.8 kg (165 lb). .  "

## 2024-09-13 NOTE — PROGRESS NOTES
[facility]     Mary is a 78 year old P2 female who presents for pessary recheck;  she currently has a ring with support pessary which she keeps in place continuously. Has on-going brown discharge. Does twice weekly vagifem tablets.     All systems were reviewed and pertinent information in noted in subjective/HPI.     Past Medical History           Past Medical History:   Diagnosis Date    HTN                     Past Surgical History              Past Surgical History:   Procedure Laterality Date    HYSTERECTOMY TOTAL ABDOMINAL   1991     Total abdominal hysterectomy & bilateral salpingo-oophorectomy    KNEE SURGERY   2003     Knee (L)         Current Outpatient Medications   Medication Sig Dispense Refill    acetaminophen (TYLENOL) 325 MG tablet Take 325-650 mg by mouth every 6 hours as needed for mild pain      aspirin 81 MG tablet Take 1 tablet by mouth daily.       CENTRUM SILVER OR TABS ONE DAILY 90 Tab 3    CRANBERRY       lisinopril-hydrochlorothiazide (ZESTORETIC) 10-12.5 MG tablet TAKE ONE TABLET BY MOUTH ONCE DAILY 90 tablet 3    VITAMIN C 500 MG OR TABS ONE TABLET DAILY 3 MONTHS 3    VITAMIN D PO Take 1 tablet by mouth daily.      estradiol (ESTRACE) 0.5 MG tablet Take 1 tablet (0.5 mg) by mouth every other day for 60 days 30 tablet 4     Current Facility-Administered Medications   Medication Dose Route Frequency Provider Last Rate Last Admin    hylan (SYNVISC ONE) injection 48 mg  48 mg   Daniel Feliciano MD   48 mg at 11/04/19 0805    hylan (SYNVISC ONE) injection 48 mg  48 mg   Daniel Feliciano MD   48 mg at 10/21/19 0900             Social History                Socioeconomic History    Marital status:        Spouse name: None    Number of children: None    Years of education: None    Highest education level: None   Occupational History    None   Tobacco Use    Smoking status: Never Smoker    Smokeless tobacco: Never Used   Vaping Use    Vaping Use: Never used  "  Substance and Sexual Activity    Alcohol use: No    Drug use: No    Sexual activity: Yes       Partners: Male   Other Topics Concern     Service No    Blood Transfusions No    Caffeine Concern No       Comment: 0--2 cups    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 No   Social History Narrative    None      Social Determinants of Health      Financial Resource Strain: Not on file   Food Insecurity: Not on file   Transportation Needs: Not on file   Physical Activity: Not on file   Stress: Not on file   Social Connections: Not on file   Intimate Partner Violence: Not on file   Housing Stability: Not on file         Family History             Family History   Problem Relation Age of Onset    Hypertension Mother      Hypertension Father      Diabetes Father      Hypertension Sister      Hypertension Brother      C.A.D. No family hx of              OBJECTIVE: BP (!) 141/77 (BP Location: Right arm, Patient Position: Chair, Cuff Size: Adult Regular)   Pulse 80   Temp (!) 96.3  F (35.7  C) (Tympanic)   Resp 14   Ht 1.6 m (5' 3\")   LMP 02/15/1992   BMI 29.23 kg/m       External vulva with mod vulvovaginal atrophy. Very superficial abrasion across the vaginal cuff.      ASSESSMENT:       ICD-10-CM     1. Pessary maintenance  Z46.89            PLAN: RETURN TO CLINIC 1 months for close monitoring of this erosion. Pt not interested in pessary break, so needs close follow up to monitor this abrasion.   Con vaginal estrace tablet. Recommended increasing to 3x weekly.      Rona Vargas MD  OB/GYN     "

## 2024-10-25 ENCOUNTER — OFFICE VISIT (OUTPATIENT)
Dept: OBGYN | Facility: CLINIC | Age: 79
End: 2024-10-25
Payer: COMMERCIAL

## 2024-10-25 VITALS
HEART RATE: 81 BPM | SYSTOLIC BLOOD PRESSURE: 148 MMHG | TEMPERATURE: 97.8 F | DIASTOLIC BLOOD PRESSURE: 86 MMHG | WEIGHT: 166 LBS | RESPIRATION RATE: 16 BRPM | BODY MASS INDEX: 29.41 KG/M2

## 2024-10-25 DIAGNOSIS — Z46.89 PESSARY MAINTENANCE: Primary | ICD-10-CM

## 2024-10-25 PROCEDURE — 99213 OFFICE O/P EST LOW 20 MIN: CPT | Performed by: OBSTETRICS & GYNECOLOGY

## 2024-10-25 NOTE — PROGRESS NOTES
[facility]   OB/GYN     Mary is a 78 year old P2 female who presents for pessary recheck;  she currently has a ring with support pessary which she keeps in place continuously. Has on-going brown discharge. Does twice weekly vagifem tablets.      All systems were reviewed and pertinent information in noted in subjective/HPI.     Past Medical History           Past Medical History:   Diagnosis Date    HTN                       Past Surgical History               Past Surgical History:   Procedure Laterality Date    HYSTERECTOMY TOTAL ABDOMINAL   1991     Total abdominal hysterectomy & bilateral salpingo-oophorectomy    KNEE SURGERY   2003     Knee (L)         Current Facility-Administered Medications          Current Outpatient Medications   Medication Sig Dispense Refill    acetaminophen (TYLENOL) 325 MG tablet Take 325-650 mg by mouth every 6 hours as needed for mild pain        aspirin 81 MG tablet Take 1 tablet by mouth daily.         CENTRUM SILVER OR TABS ONE DAILY 90 Tab 3    CRANBERRY          lisinopril-hydrochlorothiazide (ZESTORETIC) 10-12.5 MG tablet TAKE ONE TABLET BY MOUTH ONCE DAILY 90 tablet 3    VITAMIN C 500 MG OR TABS ONE TABLET DAILY 3 MONTHS 3    VITAMIN D PO Take 1 tablet by mouth daily.        estradiol (ESTRACE) 0.5 MG tablet Take 1 tablet (0.5 mg) by mouth every other day for 60 days 30 tablet 4                Current Facility-Administered Medications   Medication Dose Route Frequency Provider Last Rate Last Admin    hylan (SYNVISC ONE) injection 48 mg  48 mg     Daniel Feliciano MD   48 mg at 11/04/19 0805    hylan (SYNVISC ONE) injection 48 mg  48 mg     Daniel Feliciano MD   48 mg at 10/21/19 0900                  Social History                Socioeconomic History    Marital status:        Spouse name: None    Number of children: None    Years of education: None    Highest education level: None   Occupational History    None   Tobacco Use    Smoking status: Never  Smoker    Smokeless tobacco: Never Used   Vaping Use    Vaping Use: Never used   Substance and Sexual Activity    Alcohol use: No    Drug use: No    Sexual activity: Yes       Partners: Male   Other Topics Concern     Service No    Blood Transfusions No    Caffeine Concern No       Comment: 0--2 cups    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 No   Social History Narrative    None      Social Determinants of Health      Financial Resource Strain: Not on file   Food Insecurity: Not on file   Transportation Needs: Not on file   Physical Activity: Not on file   Stress: Not on file   Social Connections: Not on file   Intimate Partner Violence: Not on file   Housing Stability: Not on file         Family History             Family History   Problem Relation Age of Onset    Hypertension Mother      Hypertension Father      Diabetes Father      Hypertension Sister      Hypertension Brother      C.A.D. No family hx of              OBJECTIVE: BP (!) 148/86 (BP Location: Right arm, Patient Position: Sitting, Cuff Size: Adult Small)   Pulse 81   Temp 97.8  F (36.6  C) (Tympanic)   Resp 16   Wt 75.3 kg (166 lb)   LMP 02/15/1992   BMI 29.41 kg/m       External vulva with mod vulvovaginal atrophy. Very superficial abrasion across the vaginal cuff.      ASSESSMENT:       ICD-10-CM     1. Pessary maintenance  Z46.89            PLAN: RETURN TO CLINIC 6 weeks for close monitoring of this erosion. Pt not interested in pessary break, so needs close follow up to monitor this abrasion.   Con vaginal estrace tablet. Recommended increasing to 3x weekly.      Rona Vargas,  MD  OB/GYN

## 2024-11-12 DIAGNOSIS — I10 ESSENTIAL HYPERTENSION WITH GOAL BLOOD PRESSURE LESS THAN 140/90: ICD-10-CM

## 2024-11-12 RX ORDER — LISINOPRIL AND HYDROCHLOROTHIAZIDE 10; 12.5 MG/1; MG/1
1 TABLET ORAL DAILY
Qty: 90 TABLET | Refills: 0 | Status: SHIPPED | OUTPATIENT
Start: 2024-11-12

## 2025-07-30 NOTE — PROGRESS NOTES
[facility]   OB/GYN     Mary is a 79 year old P2 female who presents for pessary recheck;  she currently has a ring with support pessary which she keeps in place continuously. Has on-going scant brown discharge. Does 3x weekly vagifem tablets.      All systems were reviewed and pertinent information in noted in subjective/HPI.     Past Medical History           Past Medical History:   Diagnosis Date    HTN                       Past Surgical History               Past Surgical History:   Procedure Laterality Date    HYSTERECTOMY TOTAL ABDOMINAL   1991     Total abdominal hysterectomy & bilateral salpingo-oophorectomy    KNEE SURGERY   2003     Knee (L)                                 Current Facility-Administered Medications                    Current Outpatient Medications   Medication Sig Dispense Refill    acetaminophen (TYLENOL) 325 MG tablet Take 325-650 mg by mouth every 6 hours as needed for mild pain        aspirin 81 MG tablet Take 1 tablet by mouth daily.         CENTRUM SILVER OR TABS ONE DAILY 90 Tab 3    CRANBERRY          lisinopril-hydrochlorothiazide (ZESTORETIC) 10-12.5 MG tablet TAKE ONE TABLET BY MOUTH ONCE DAILY 90 tablet 3    VITAMIN C 500 MG OR TABS ONE TABLET DAILY 3 MONTHS 3    VITAMIN D PO Take 1 tablet by mouth daily.        estradiol (ESTRACE) 0.5 MG tablet Take 1 tablet (0.5 mg) by mouth every other day for 60 days 30 tablet 4                        Current Facility-Administered Medications   Medication Dose Route Frequency Provider Last Rate Last Admin    hylan (SYNVISC ONE) injection 48 mg  48 mg     Daniel Feliciano MD   48 mg at 11/04/19 0805    hylan (SYNVISC ONE) injection 48 mg  48 mg     Daniel Feliciano MD   48 mg at 10/21/19 0900                  Social History                Socioeconomic History    Marital status:        Spouse name: None    Number of children: None    Years of education: None    Highest education level: None   Occupational  "History    None   Tobacco Use    Smoking status: Never Smoker    Smokeless tobacco: Never Used   Vaping Use    Vaping Use: Never used   Substance and Sexual Activity    Alcohol use: No    Drug use: No    Sexual activity: Yes       Partners: Male   Other Topics Concern     Service No    Blood Transfusions No    Caffeine Concern No       Comment: 0--2 cups    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 No   Social History Narrative    None      Social Determinants of Health      Financial Resource Strain: Not on file   Food Insecurity: Not on file   Transportation Needs: Not on file   Physical Activity: Not on file   Stress: Not on file   Social Connections: Not on file   Intimate Partner Violence: Not on file   Housing Stability: Not on file         Family History             Family History   Problem Relation Age of Onset    Hypertension Mother      Hypertension Father      Diabetes Father      Hypertension Sister      Hypertension Brother      C.A.D. No family hx of              OBJECTIVE: BP (!) 153/82 (BP Location: Left arm, Patient Position: Chair, Cuff Size: Adult Regular)   Pulse 56   Resp 18   Ht 1.6 m (5' 3\")   Wt 75.4 kg (166 lb 3.2 oz)   LMP 02/15/1992   BMI 29.44 kg/m       External vulva with mod vulvovaginal atrophy. Very superficial abrasion across the vaginal cuff, stable from last appt.      ASSESSMENT:          PLAN: RETURN TO CLINIC 3 months for close monitoring of this erosion. Pt not interested in pessary break, so needs close follow up to monitor this abrasion.   Con vaginal estrace tablet at 3x weekly given improvement in  superficial abrasion " on vaginal cuff.      Rona Vargas MD  OB/GYN

## 2025-07-31 ENCOUNTER — OFFICE VISIT (OUTPATIENT)
Dept: OBGYN | Facility: CLINIC | Age: 80
End: 2025-07-31
Payer: COMMERCIAL

## 2025-07-31 VITALS
SYSTOLIC BLOOD PRESSURE: 153 MMHG | DIASTOLIC BLOOD PRESSURE: 82 MMHG | HEART RATE: 56 BPM | RESPIRATION RATE: 18 BRPM | WEIGHT: 166.2 LBS | BODY MASS INDEX: 29.45 KG/M2 | HEIGHT: 63 IN

## 2025-07-31 DIAGNOSIS — Z46.89 PESSARY MAINTENANCE: Primary | ICD-10-CM

## 2025-07-31 NOTE — PROGRESS NOTES
"Initial BP (!) 153/82 (BP Location: Left arm, Patient Position: Chair, Cuff Size: Adult Regular)   Pulse 56   Resp 18   Ht 1.6 m (5' 3\")   Wt 75.4 kg (166 lb 3.2 oz)   LMP 02/15/1992   BMI 29.44 kg/m   Estimated body mass index is 29.44 kg/m  as calculated from the following:    Height as of this encounter: 1.6 m (5' 3\").    Weight as of this encounter: 75.4 kg (166 lb 3.2 oz). .    "